# Patient Record
Sex: FEMALE | Race: WHITE | NOT HISPANIC OR LATINO | Employment: OTHER | URBAN - METROPOLITAN AREA
[De-identification: names, ages, dates, MRNs, and addresses within clinical notes are randomized per-mention and may not be internally consistent; named-entity substitution may affect disease eponyms.]

---

## 2017-02-28 ENCOUNTER — TELEPHONE (OUTPATIENT)
Dept: INTERNAL MEDICINE | Facility: CLINIC | Age: 67
End: 2017-02-28

## 2017-02-28 RX ORDER — ROSUVASTATIN CALCIUM 10 MG/1
TABLET, COATED ORAL
Qty: 90 TABLET | Refills: 2 | Status: SHIPPED | OUTPATIENT
Start: 2017-02-28 | End: 2017-03-09 | Stop reason: SDUPTHER

## 2017-02-28 NOTE — TELEPHONE ENCOUNTER
----- Message from Lucie Pacheco sent at 2/28/2017 12:14 PM EST -----  Regarding: INDRA MENDEZ    1st time filled with Dr. Mendez - previously Dr. Gio SANTO 10 MG tablet - TAKE ONE TABLET DAILY   (GENERIC CRESTOR PLEASE)   #90  [facility] [place]  Drug allergies - sulfa    [phone]    Sent to pharmacy

## 2017-03-09 RX ORDER — ROSUVASTATIN CALCIUM 10 MG/1
TABLET, COATED ORAL
Qty: 90 TABLET | Refills: 2 | Status: SHIPPED | OUTPATIENT
Start: 2017-03-09 | End: 2017-09-27

## 2017-03-24 DIAGNOSIS — E03.9 HYPOTHYROIDISM, UNSPECIFIED TYPE: ICD-10-CM

## 2017-03-24 DIAGNOSIS — E78.5 HYPERLIPIDEMIA, UNSPECIFIED HYPERLIPIDEMIA TYPE: ICD-10-CM

## 2017-03-24 DIAGNOSIS — E78.5 HYPERLIPIDEMIA, UNSPECIFIED HYPERLIPIDEMIA TYPE: Primary | ICD-10-CM

## 2017-03-24 LAB
ALBUMIN SERPL-MCNC: 4.4 G/DL (ref 3.5–5.2)
ALBUMIN/GLOB SERPL: 1.5 G/DL
ALP SERPL-CCNC: 65 U/L (ref 40–129)
ALT SERPL-CCNC: 20 U/L (ref 5–33)
AST SERPL-CCNC: 26 U/L (ref 5–32)
BASOPHILS # BLD AUTO: 0.03 10*3/MM3 (ref 0–0.2)
BASOPHILS NFR BLD AUTO: 0.6 % (ref 0–2)
BILIRUB SERPL-MCNC: 0.5 MG/DL (ref 0.2–1.2)
BUN SERPL-MCNC: 11 MG/DL (ref 8–23)
BUN/CREAT SERPL: 16.2 (ref 7–25)
CALCIUM SERPL-MCNC: 9.6 MG/DL (ref 8.8–10.5)
CHLORIDE SERPL-SCNC: 98 MMOL/L (ref 98–107)
CHOLEST SERPL-MCNC: 174 MG/DL (ref 0–200)
CHOLEST/HDLC SERPL: 2.85 {RATIO}
CO2 SERPL-SCNC: 29.1 MMOL/L (ref 22–29)
CREAT SERPL-MCNC: 0.68 MG/DL (ref 0.57–1)
EOSINOPHIL # BLD AUTO: 0.3 10*3/MM3 (ref 0.1–0.3)
EOSINOPHIL NFR BLD AUTO: 6.3 % (ref 0–4)
ERYTHROCYTE [DISTWIDTH] IN BLOOD BY AUTOMATED COUNT: 12.8 % (ref 11.5–14.5)
GLOBULIN SER CALC-MCNC: 3 GM/DL
GLUCOSE SERPL-MCNC: 95 MG/DL (ref 65–99)
HCT VFR BLD AUTO: 39.6 % (ref 37–47)
HDLC SERPL-MCNC: 61 MG/DL (ref 40–60)
HGB BLD-MCNC: 13 G/DL (ref 12–16)
IMM GRANULOCYTES # BLD: 0.01 10*3/MM3 (ref 0–0.03)
IMM GRANULOCYTES NFR BLD: 0.2 % (ref 0–0.5)
LDLC SERPL CALC-MCNC: 74 MG/DL (ref 0–100)
LYMPHOCYTES # BLD AUTO: 1.46 10*3/MM3 (ref 0.6–4.8)
LYMPHOCYTES NFR BLD AUTO: 30.8 % (ref 20–45)
MCH RBC QN AUTO: 33.8 PG (ref 27–31)
MCHC RBC AUTO-ENTMCNC: 32.8 G/DL (ref 31–37)
MCV RBC AUTO: 102.9 FL (ref 81–99)
MONOCYTES # BLD AUTO: 0.51 10*3/MM3 (ref 0–1)
MONOCYTES NFR BLD AUTO: 10.8 % (ref 3–8)
NEUTROPHILS # BLD AUTO: 2.43 10*3/MM3 (ref 1.5–8.3)
NEUTROPHILS NFR BLD AUTO: 51.3 % (ref 45–70)
NRBC BLD AUTO-RTO: 0 /100 WBC (ref 0–0)
PLATELET # BLD AUTO: 285 10*3/MM3 (ref 140–500)
POTASSIUM SERPL-SCNC: 5 MMOL/L (ref 3.5–5.2)
PROT SERPL-MCNC: 7.4 G/DL (ref 6–8.5)
RBC # BLD AUTO: 3.85 10*6/MM3 (ref 4.2–5.4)
SODIUM SERPL-SCNC: 137 MMOL/L (ref 136–145)
T4 FREE SERPL-MCNC: 1.68 NG/DL (ref 0.93–1.7)
TRIGL SERPL-MCNC: 194 MG/DL (ref 0–150)
TSH SERPL DL<=0.005 MIU/L-ACNC: 0.2 MIU/ML (ref 0.27–4.2)
VLDLC SERPL CALC-MCNC: 38.8 MG/DL (ref 7–27)
WBC # BLD AUTO: 4.74 10*3/MM3 (ref 4.8–10.8)

## 2017-03-28 ENCOUNTER — DOCUMENTATION (OUTPATIENT)
Dept: INTERNAL MEDICINE | Facility: CLINIC | Age: 67
End: 2017-03-28

## 2017-03-29 ENCOUNTER — OFFICE VISIT (OUTPATIENT)
Dept: INTERNAL MEDICINE | Facility: CLINIC | Age: 67
End: 2017-03-29

## 2017-03-29 VITALS
HEIGHT: 62 IN | DIASTOLIC BLOOD PRESSURE: 72 MMHG | HEART RATE: 68 BPM | OXYGEN SATURATION: 98 % | BODY MASS INDEX: 29.26 KG/M2 | TEMPERATURE: 98.1 F | WEIGHT: 159 LBS | SYSTOLIC BLOOD PRESSURE: 128 MMHG

## 2017-03-29 DIAGNOSIS — D75.89 MACROCYTOSIS WITHOUT ANEMIA: ICD-10-CM

## 2017-03-29 DIAGNOSIS — E78.5 HYPERLIPIDEMIA, UNSPECIFIED HYPERLIPIDEMIA TYPE: Primary | ICD-10-CM

## 2017-03-29 DIAGNOSIS — F32.A DEPRESSION, UNSPECIFIED DEPRESSION TYPE: ICD-10-CM

## 2017-03-29 DIAGNOSIS — E03.9 HYPOTHYROIDISM, UNSPECIFIED TYPE: ICD-10-CM

## 2017-03-29 DIAGNOSIS — K51.919 ULCERATIVE COLITIS WITH COMPLICATION, UNSPECIFIED LOCATION (HCC): ICD-10-CM

## 2017-03-29 DIAGNOSIS — M85.80 OSTEOPENIA: ICD-10-CM

## 2017-03-29 PROCEDURE — 99214 OFFICE O/P EST MOD 30 MIN: CPT | Performed by: FAMILY MEDICINE

## 2017-03-29 NOTE — PROGRESS NOTES
Subjective     Yelena Rangel is a 66 y.o. female, who presents with a chief complaint of   Chief Complaint   Patient presents with   • Hyperlipidemia   • Hypothyroidism       HPI     1. Hyperlipidemia.  Having muscle pains in arms and legs.    2. Ulcerative colitis.  Pt denies flare.  She sees Dr. Saldaña.    3. Depression. We changed from Pristiq to venlafaxine due to cost.  She reports that she is doing well.    The following portions of the patient's history were reviewed and updated as appropriate: allergies, current medications, past family history, past medical history, past social history, past surgical history and problem list.    Allergies: Sulfa antibiotics    Review of Systems   Constitutional: Negative.    HENT: Negative.    Eyes: Negative.    Respiratory: Negative.    Cardiovascular: Negative.    Gastrointestinal: Negative.    Endocrine: Negative.    Genitourinary: Negative.    Musculoskeletal: Positive for arthralgias.   Skin: Negative.    Allergic/Immunologic: Positive for environmental allergies.   Neurological: Negative.    Hematological: Negative.    Psychiatric/Behavioral: Negative.        Objective     Wt Readings from Last 3 Encounters:   03/29/17 159 lb (72.1 kg)   09/28/16 158 lb (71.7 kg)   09/01/16 160 lb 12.8 oz (72.9 kg)     Temp Readings from Last 3 Encounters:   03/29/17 98.1 °F (36.7 °C)   05/13/16 98.1 °F (36.7 °C) (Oral)   10/26/15 97.4 °F (36.3 °C) (Oral)     BP Readings from Last 3 Encounters:   03/29/17 128/72   09/28/16 130/80   09/01/16 126/86     Pulse Readings from Last 3 Encounters:   03/29/17 68   09/28/16 84   09/01/16 75     Body mass index is 29.08 kg/(m^2).  SpO2 Readings from Last 3 Encounters:   03/29/17 98%   09/28/16 97%   05/13/16 98%       Physical Exam   Constitutional: She is oriented to person, place, and time. She appears well-developed and well-nourished.   HENT:   Head: Normocephalic.   Mouth/Throat: Oropharynx is clear and moist.   Eyes: Conjunctivae and  EOM are normal. Pupils are equal, round, and reactive to light.   Neck: Normal range of motion. Neck supple. No thyromegaly present.   Cardiovascular: Normal rate, regular rhythm and normal heart sounds.    Pulmonary/Chest: Effort normal and breath sounds normal.   Abdominal: Soft. Bowel sounds are normal. There is no hepatosplenomegaly.   Musculoskeletal: Normal range of motion. She exhibits no edema.   Lymphadenopathy:     She has no cervical adenopathy.   Neurological: She is alert and oriented to person, place, and time.   Skin: Skin is warm and dry. No rash noted.   Psychiatric: She has a normal mood and affect. Her behavior is normal.   Vitals reviewed.      Results for orders placed or performed in visit on 03/24/17   Comprehensive Metabolic Panel   Result Value Ref Range    Glucose 95 65 - 99 mg/dL    BUN 11 8 - 23 mg/dL    Creatinine 0.68 0.57 - 1.00 mg/dL    eGFR Non African Am 87 >60 mL/min/1.73    eGFR African Am 105 >60 mL/min/1.73    BUN/Creatinine Ratio 16.2 7.0 - 25.0    Sodium 137 136 - 145 mmol/L    Potassium 5.0 3.5 - 5.2 mmol/L    Chloride 98 98 - 107 mmol/L    Total CO2 29.1 (H) 22.0 - 29.0 mmol/L    Calcium 9.6 8.8 - 10.5 mg/dL    Total Protein 7.4 6.0 - 8.5 g/dL    Albumin 4.40 3.50 - 5.20 g/dL    Globulin 3.0 gm/dL    A/G Ratio 1.5 g/dL    Total Bilirubin 0.5 0.2 - 1.2 mg/dL    Alkaline Phosphatase 65 40 - 129 U/L    AST (SGOT) 26 5 - 32 U/L    ALT (SGPT) 20 5 - 33 U/L   Lipid Panel With / Chol / HDL Ratio   Result Value Ref Range    Total Cholesterol 174 0 - 200 mg/dL    Triglycerides 194 (H) 0 - 150 mg/dL    HDL Cholesterol 61 (H) 40 - 60 mg/dL    VLDL Cholesterol 38.8 (H) 7 - 27 mg/dL    LDL Cholesterol  74 0 - 100 mg/dL    Chol/HDL Ratio 2.85    TSH+Free T4   Result Value Ref Range    TSH 0.203 (L) 0.270 - 4.200 mIU/mL    Free T4 1.68 0.93 - 1.70 ng/dL   CBC w AUTO Differential   Result Value Ref Range    WBC 4.74 (L) 4.80 - 10.80 10*3/mm3    RBC 3.85 (L) 4.20 - 5.40 10*6/mm3     Hemoglobin 13.0 12.0 - 16.0 g/dL    Hematocrit 39.6 37.0 - 47.0 %    .9 (H) 81.0 - 99.0 fL    MCH 33.8 (H) 27.0 - 31.0 pg    MCHC 32.8 31.0 - 37.0 g/dL    RDW 12.8 11.5 - 14.5 %    Platelets 285 140 - 500 10*3/mm3    Neutrophil Rel % 51.3 45.0 - 70.0 %    Lymphocyte Rel % 30.8 20.0 - 45.0 %    Monocyte Rel % 10.8 (H) 3.0 - 8.0 %    Eosinophil Rel % 6.3 (H) 0.0 - 4.0 %    Basophil Rel % 0.6 0.0 - 2.0 %    Neutrophils Absolute 2.43 1.50 - 8.30 10*3/mm3    Lymphocytes Absolute 1.46 0.60 - 4.80 10*3/mm3    Monocytes Absolute 0.51 0.00 - 1.00 10*3/mm3    Eosinophils Absolute 0.30 0.10 - 0.30 10*3/mm3    Basophils Absolute 0.03 0.00 - 0.20 10*3/mm3    Immature Granulocyte Rel % 0.2 0.0 - 0.5 %    Immature Grans Absolute 0.01 0.00 - 0.03 10*3/mm3    nRBC 0.0 0.0 - 0.0 /100 WBC       Assessment/Plan   Yelena was seen today for hyperlipidemia and hypothyroidism.    Diagnoses and all orders for this visit:    Hyperlipidemia, unspecified hyperlipidemia type  -     Comprehensive Metabolic Panel; Future  -     Lipid Panel With / Chol / HDL Ratio; Future    Hypothyroidism, unspecified type  -     CBC & Differential; Future  -     TSH; Future  -     T4, Free; Future    Depression, unspecified depression type    Osteopenia    Macrocytosis without anemia  -     Vitamin B12; Future  -     Folate RBC; Future    Ulcerative colitis with complication, unspecified location    1. Hyperlipidemia.  Not tolerate statin (myalgias).  Hold Crestor X 2 weeks.  If feeling better, stay off the statin until next visit.  Lifestyle measures.    2. Hypothyroidism.  Adequate replacement.    3. Depression.  Controlled.  Continue venlafaxine and lifestyle measures.    4. Osteopenia.  Calcium, vitamin D, and weight bearing exercise.    5. Macrocytosis.  Check b12, folate next time.  Decrease alcohol intake.    6. Ulcerative colitis.  No flare.  Continue same per Dr. Saldaña.      Outpatient Medications Prior to Visit   Medication Sig Dispense  Refill   • AZASAN 75 MG tablet 75 mg daily.     • cetirizine (ZyrTEC) 10 MG tablet Take 10 mg by mouth daily.     • diclofenac (VOLTAREN) 1 % gel gel Apply 4 g topically as needed.     • hydrocortisone (ANUCORT-HC) 25 MG suppository Insert 25 mg into the rectum daily.     • levothyroxine (SYNTHROID, LEVOTHROID) 112 MCG tablet Take 1 tablet by mouth daily. 90 tablet 2   • mesalamine (LIALDA) 1.2 G EC tablet Take  by mouth.     • multivitamin (DAILY RAFFY) tablet tablet Take 1 tablet by mouth daily.     • omeprazole (PriLOSEC) 20 MG capsule TAKE ONE CAPSULE BY MOUTH DAILY 90 capsule 2   • rosuvastatin (CRESTOR) 10 MG tablet Take one po daily 90 tablet 2   • venlafaxine XR (EFFEXOR XR) 75 MG 24 hr capsule Take 1 capsule by mouth Daily. 90 capsule 1     No facility-administered medications prior to visit.      No orders of the defined types were placed in this encounter.    [unfilled]  There are no discontinued medications.      Return in about 6 months (around 9/29/2017).

## 2017-04-03 ENCOUNTER — RESULTS ENCOUNTER (OUTPATIENT)
Dept: INTERNAL MEDICINE | Facility: CLINIC | Age: 67
End: 2017-04-03

## 2017-04-03 DIAGNOSIS — D75.89 MACROCYTOSIS WITHOUT ANEMIA: ICD-10-CM

## 2017-04-03 DIAGNOSIS — E78.5 HYPERLIPIDEMIA, UNSPECIFIED HYPERLIPIDEMIA TYPE: ICD-10-CM

## 2017-04-03 DIAGNOSIS — E03.9 HYPOTHYROIDISM, UNSPECIFIED TYPE: ICD-10-CM

## 2017-04-03 RX ORDER — OMEPRAZOLE 20 MG/1
CAPSULE, DELAYED RELEASE ORAL
Qty: 90 CAPSULE | Refills: 2 | Status: SHIPPED | OUTPATIENT
Start: 2017-04-03 | End: 2017-12-18 | Stop reason: SDUPTHER

## 2017-06-19 RX ORDER — LEVOTHYROXINE SODIUM 112 UG/1
TABLET ORAL
Qty: 90 TABLET | Refills: 1 | Status: SHIPPED | OUTPATIENT
Start: 2017-06-19 | End: 2017-12-07 | Stop reason: SDUPTHER

## 2017-08-01 ENCOUNTER — TRANSCRIBE ORDERS (OUTPATIENT)
Dept: INTERNAL MEDICINE | Facility: CLINIC | Age: 67
End: 2017-08-01

## 2017-08-01 DIAGNOSIS — Z13.9 SCREENING: Primary | ICD-10-CM

## 2017-09-07 ENCOUNTER — HOSPITAL ENCOUNTER (OUTPATIENT)
Dept: MAMMOGRAPHY | Facility: HOSPITAL | Age: 67
Discharge: HOME | End: 2017-09-07
Attending: FAMILY MEDICINE | Admitting: FAMILY MEDICINE

## 2017-09-07 DIAGNOSIS — Z13.9 SCREENING: ICD-10-CM

## 2017-09-07 PROCEDURE — G0202 SCR MAMMO BI INCL CAD: HCPCS

## 2017-09-07 PROCEDURE — 77063 BREAST TOMOSYNTHESIS BI: CPT

## 2017-09-19 ENCOUNTER — OFFICE (OUTPATIENT)
Dept: URBAN - METROPOLITAN AREA OTHER 6 | Facility: OTHER | Age: 67
End: 2017-09-19

## 2017-09-19 VITALS
SYSTOLIC BLOOD PRESSURE: 140 MMHG | HEART RATE: 72 BPM | DIASTOLIC BLOOD PRESSURE: 80 MMHG | WEIGHT: 156 LBS | HEIGHT: 62 IN

## 2017-09-19 DIAGNOSIS — E03.9 HYPOTHYROIDISM, UNSPECIFIED TYPE: ICD-10-CM

## 2017-09-19 DIAGNOSIS — K51.30 ULCERATIVE (CHRONIC) RECTOSIGMOIDITIS WITHOUT COMPLICATIONS: ICD-10-CM

## 2017-09-19 DIAGNOSIS — Z00.00 HEALTHCARE MAINTENANCE: ICD-10-CM

## 2017-09-19 DIAGNOSIS — E78.5 HYPERLIPIDEMIA, UNSPECIFIED HYPERLIPIDEMIA TYPE: Primary | ICD-10-CM

## 2017-09-19 PROCEDURE — 99213 OFFICE O/P EST LOW 20 MIN: CPT

## 2017-09-20 ENCOUNTER — LAB (OUTPATIENT)
Dept: INTERNAL MEDICINE | Facility: CLINIC | Age: 67
End: 2017-09-20

## 2017-09-20 DIAGNOSIS — E78.5 HYPERLIPIDEMIA, UNSPECIFIED HYPERLIPIDEMIA TYPE: ICD-10-CM

## 2017-09-20 DIAGNOSIS — Z00.00 HEALTHCARE MAINTENANCE: ICD-10-CM

## 2017-09-20 DIAGNOSIS — E03.9 HYPOTHYROIDISM, UNSPECIFIED TYPE: ICD-10-CM

## 2017-09-20 LAB
ALBUMIN SERPL-MCNC: 4.3 G/DL (ref 3.5–5.2)
ALBUMIN/GLOB SERPL: 1.4 G/DL
ALP SERPL-CCNC: 60 U/L (ref 40–129)
ALT SERPL-CCNC: 10 U/L (ref 5–33)
AST SERPL-CCNC: 22 U/L (ref 5–32)
BASOPHILS # BLD AUTO: 0.02 10*3/MM3 (ref 0–0.2)
BASOPHILS NFR BLD AUTO: 0.5 % (ref 0–2)
BILIRUB SERPL-MCNC: 0.6 MG/DL (ref 0.2–1.2)
BUN SERPL-MCNC: 11 MG/DL (ref 8–23)
BUN/CREAT SERPL: 18.3 (ref 7–25)
CALCIUM SERPL-MCNC: 9.6 MG/DL (ref 8.8–10.5)
CHLORIDE SERPL-SCNC: 97 MMOL/L (ref 98–107)
CHOLEST SERPL-MCNC: 246 MG/DL (ref 0–200)
CO2 SERPL-SCNC: 25.7 MMOL/L (ref 22–29)
CREAT SERPL-MCNC: 0.6 MG/DL (ref 0.57–1)
CRP SERPL-MCNC: 0.63 MG/DL (ref 0–0.5)
EOSINOPHIL # BLD AUTO: 0.21 10*3/MM3 (ref 0.1–0.3)
EOSINOPHIL NFR BLD AUTO: 5.4 % (ref 0–4)
ERYTHROCYTE [DISTWIDTH] IN BLOOD BY AUTOMATED COUNT: 13.3 % (ref 11.5–14.5)
FOLATE SERPL-MCNC: >20 NG/ML (ref 4.78–24.2)
GLOBULIN SER CALC-MCNC: 3.1 GM/DL
GLUCOSE SERPL-MCNC: 95 MG/DL (ref 65–99)
HCT VFR BLD AUTO: 39.7 % (ref 37–47)
HDLC SERPL-MCNC: 60 MG/DL (ref 40–60)
HGB BLD-MCNC: 13 G/DL (ref 12–16)
IMM GRANULOCYTES # BLD: 0.01 10*3/MM3 (ref 0–0.03)
IMM GRANULOCYTES NFR BLD: 0.3 % (ref 0–0.5)
LDLC SERPL CALC-MCNC: 152 MG/DL (ref 0–100)
LDLC/HDLC SERPL: 2.53 {RATIO}
LYMPHOCYTES # BLD AUTO: 1 10*3/MM3 (ref 0.6–4.8)
LYMPHOCYTES NFR BLD AUTO: 25.5 % (ref 20–45)
MCH RBC QN AUTO: 34 PG (ref 27–31)
MCHC RBC AUTO-ENTMCNC: 32.7 G/DL (ref 31–37)
MCV RBC AUTO: 103.9 FL (ref 81–99)
MONOCYTES # BLD AUTO: 0.4 10*3/MM3 (ref 0–1)
MONOCYTES NFR BLD AUTO: 10.2 % (ref 3–8)
NEUTROPHILS # BLD AUTO: 2.28 10*3/MM3 (ref 1.5–8.3)
NEUTROPHILS NFR BLD AUTO: 58.1 % (ref 45–70)
NRBC BLD AUTO-RTO: 0 /100 WBC (ref 0–0)
PLATELET # BLD AUTO: 306 10*3/MM3 (ref 140–500)
POTASSIUM SERPL-SCNC: 5.1 MMOL/L (ref 3.5–5.2)
PROT SERPL-MCNC: 7.4 G/DL (ref 6–8.5)
RBC # BLD AUTO: 3.82 10*6/MM3 (ref 4.2–5.4)
SODIUM SERPL-SCNC: 137 MMOL/L (ref 136–145)
T4 FREE SERPL-MCNC: 1.53 NG/DL (ref 0.93–1.7)
TRIGL SERPL-MCNC: 170 MG/DL (ref 0–150)
TSH SERPL DL<=0.005 MIU/L-ACNC: 0.18 MIU/ML (ref 0.27–4.2)
VIT B12 SERPL-MCNC: 835 PG/ML (ref 211–946)
VLDLC SERPL CALC-MCNC: 34 MG/DL (ref 7–27)
WBC # BLD AUTO: 3.92 10*3/MM3 (ref 4.8–10.8)

## 2017-09-27 ENCOUNTER — OFFICE VISIT (OUTPATIENT)
Dept: INTERNAL MEDICINE | Facility: CLINIC | Age: 67
End: 2017-09-27

## 2017-09-27 VITALS
OXYGEN SATURATION: 98 % | DIASTOLIC BLOOD PRESSURE: 80 MMHG | HEIGHT: 62 IN | WEIGHT: 155.6 LBS | SYSTOLIC BLOOD PRESSURE: 140 MMHG | BODY MASS INDEX: 28.63 KG/M2 | HEART RATE: 72 BPM | TEMPERATURE: 98.2 F

## 2017-09-27 DIAGNOSIS — E03.9 HYPOTHYROIDISM, UNSPECIFIED TYPE: ICD-10-CM

## 2017-09-27 DIAGNOSIS — E78.5 HYPERLIPIDEMIA, UNSPECIFIED HYPERLIPIDEMIA TYPE: ICD-10-CM

## 2017-09-27 DIAGNOSIS — M85.80 OSTEOPENIA: ICD-10-CM

## 2017-09-27 DIAGNOSIS — K51.919 ULCERATIVE COLITIS WITH COMPLICATION, UNSPECIFIED LOCATION (HCC): ICD-10-CM

## 2017-09-27 DIAGNOSIS — F32.A DEPRESSION, UNSPECIFIED DEPRESSION TYPE: ICD-10-CM

## 2017-09-27 DIAGNOSIS — Z23 NEED FOR INFLUENZA VACCINATION: ICD-10-CM

## 2017-09-27 DIAGNOSIS — D75.89 MACROCYTOSIS WITHOUT ANEMIA: ICD-10-CM

## 2017-09-27 DIAGNOSIS — Z00.00 HEALTHCARE MAINTENANCE: Primary | ICD-10-CM

## 2017-09-27 PROCEDURE — G0008 ADMIN INFLUENZA VIRUS VAC: HCPCS | Performed by: FAMILY MEDICINE

## 2017-09-27 PROCEDURE — 99214 OFFICE O/P EST MOD 30 MIN: CPT | Performed by: FAMILY MEDICINE

## 2017-09-27 NOTE — PROGRESS NOTES
Subjective     Yelena Rangel is a 66 y.o. female, who presents with a chief complaint of   Chief Complaint   Patient presents with   • Hyperlipidemia       Hyperlipidemia     1. Hyperlipidemia.  She stopped the rosuvastatin and her myalgias resolved.    2. Ulcerative colitis.  Pt had a recent mild flare, which she thinks is resolving.  She sees Dr. Saldaña.    3. Depression.  Pt reports she is doing well with venlafaxine.    The following portions of the patient's history were reviewed and updated as appropriate: allergies, current medications, past family history, past medical history, past social history, past surgical history and problem list.    Allergies: Sulfa antibiotics    Review of Systems   Constitutional: Negative.    HENT: Negative.    Eyes: Negative.    Respiratory: Negative.    Cardiovascular: Negative.    Gastrointestinal: Negative.    Endocrine: Negative.    Genitourinary: Negative.    Musculoskeletal: Negative.    Skin: Negative.    Allergic/Immunologic: Positive for environmental allergies.   Neurological: Negative.    Hematological: Negative.    Psychiatric/Behavioral: Negative.        Objective     Wt Readings from Last 3 Encounters:   09/27/17 155 lb 9.6 oz (70.6 kg)   03/29/17 159 lb (72.1 kg)   09/28/16 158 lb (71.7 kg)     Temp Readings from Last 3 Encounters:   09/27/17 98.2 °F (36.8 °C)   03/29/17 98.1 °F (36.7 °C)   05/13/16 98.1 °F (36.7 °C) (Oral)     BP Readings from Last 3 Encounters:   09/27/17 140/80   03/29/17 128/72   09/28/16 130/80     Pulse Readings from Last 3 Encounters:   09/27/17 72   03/29/17 68   09/28/16 84     Body mass index is 28.46 kg/(m^2).  SpO2 Readings from Last 3 Encounters:   09/27/17 98%   03/29/17 98%   09/28/16 97%       Physical Exam   Constitutional: She is oriented to person, place, and time. She appears well-developed and well-nourished.   HENT:   Head: Normocephalic.   Mouth/Throat: Oropharynx is clear and moist.   Eyes: Conjunctivae and EOM are  normal. Pupils are equal, round, and reactive to light.   Neck: Normal range of motion. Neck supple. No thyromegaly present.   Cardiovascular: Normal rate, regular rhythm and normal heart sounds.    Pulmonary/Chest: Effort normal and breath sounds normal.   Abdominal: Soft. Bowel sounds are normal. There is no hepatosplenomegaly.   Musculoskeletal: Normal range of motion. She exhibits no edema.   Lymphadenopathy:     She has no cervical adenopathy.   Neurological: She is alert and oriented to person, place, and time.   Skin: Skin is warm and dry. No rash noted.   Psychiatric: She has a normal mood and affect. Her behavior is normal.   Vitals reviewed.      Results for orders placed or performed in visit on 09/20/17   Comprehensive metabolic panel   Result Value Ref Range    Glucose 95 65 - 99 mg/dL    BUN 11 8 - 23 mg/dL    Creatinine 0.60 0.57 - 1.00 mg/dL    eGFR Non African Am 100 >60 mL/min/1.73    eGFR African Am 122 >60 mL/min/1.73    BUN/Creatinine Ratio 18.3 7.0 - 25.0    Sodium 137 136 - 145 mmol/L    Potassium 5.1 3.5 - 5.2 mmol/L    Chloride 97 (L) 98 - 107 mmol/L    Total CO2 25.7 22.0 - 29.0 mmol/L    Calcium 9.6 8.8 - 10.5 mg/dL    Total Protein 7.4 6.0 - 8.5 g/dL    Albumin 4.30 3.50 - 5.20 g/dL    Globulin 3.1 gm/dL    A/G Ratio 1.4 g/dL    Total Bilirubin 0.6 0.2 - 1.2 mg/dL    Alkaline Phosphatase 60 40 - 129 U/L    AST (SGOT) 22 5 - 32 U/L    ALT (SGPT) 10 5 - 33 U/L   Folate   Result Value Ref Range    Folate >20.00 4.78 - 24.20 ng/mL   Vitamin B12   Result Value Ref Range    Vitamin B-12 835 211 - 946 pg/mL   TSH   Result Value Ref Range    TSH 0.181 (L) 0.270 - 4.200 mIU/mL   T4, free   Result Value Ref Range    Free T4 1.53 0.93 - 1.70 ng/dL   Lipid Panel With LDL / HDL Ratio   Result Value Ref Range    Total Cholesterol 246 (H) 0 - 200 mg/dL    Triglycerides 170 (H) 0 - 150 mg/dL    HDL Cholesterol 60 40 - 60 mg/dL    VLDL Cholesterol 34 (H) 7 - 27 mg/dL    LDL Cholesterol  152 (H) 0 - 100  mg/dL    LDL/HDL Ratio 2.53    C-reactive Protein   Result Value Ref Range    C-Reactive Protein 0.63 (H) 0.00 - 0.50 mg/dL   CBC w AUTO Differential   Result Value Ref Range    WBC 3.92 (L) 4.80 - 10.80 10*3/mm3    RBC 3.82 (L) 4.20 - 5.40 10*6/mm3    Hemoglobin 13.0 12.0 - 16.0 g/dL    Hematocrit 39.7 37.0 - 47.0 %    .9 (H) 81.0 - 99.0 fL    MCH 34.0 (H) 27.0 - 31.0 pg    MCHC 32.7 31.0 - 37.0 g/dL    RDW 13.3 11.5 - 14.5 %    Platelets 306 140 - 500 10*3/mm3    Neutrophil Rel % 58.1 45.0 - 70.0 %    Lymphocyte Rel % 25.5 20.0 - 45.0 %    Monocyte Rel % 10.2 (H) 3.0 - 8.0 %    Eosinophil Rel % 5.4 (H) 0.0 - 4.0 %    Basophil Rel % 0.5 0.0 - 2.0 %    Neutrophils Absolute 2.28 1.50 - 8.30 10*3/mm3    Lymphocytes Absolute 1.00 0.60 - 4.80 10*3/mm3    Monocytes Absolute 0.40 0.00 - 1.00 10*3/mm3    Eosinophils Absolute 0.21 0.10 - 0.30 10*3/mm3    Basophils Absolute 0.02 0.00 - 0.20 10*3/mm3    Immature Granulocyte Rel % 0.3 0.0 - 0.5 %    Immature Grans Absolute 0.01 0.00 - 0.03 10*3/mm3    nRBC 0.0 0.0 - 0.0 /100 WBC       Assessment/Plan   Yelena was seen today for hyperlipidemia.    Diagnoses and all orders for this visit:    Healthcare maintenance  -     pneumococcal conj. 13-valent (PREVNAR-13) vaccine 0.5 mL; Inject 0.5 mL into the shoulder, thigh, or buttocks 1 (One) Time.  -     Flu Vaccine High Dose PF 65YR+    Need for influenza vaccination  -     Flu Vaccine High Dose PF 65YR+    Hyperlipidemia, unspecified hyperlipidemia type  -     Comprehensive Metabolic Panel; Future  -     Lipid Panel With / Chol / HDL Ratio; Future    Hypothyroidism, unspecified type  -     CBC & Differential; Future  -     TSH; Future  -     T4, Free; Future    Depression, unspecified depression type    Osteopenia    Macrocytosis without anemia    Ulcerative colitis with complication, unspecified location    1. Hyperlipidemia.  Off statin.  10 year risk 7%.   Discussed options.  She will treat this with lifestyle measures for  now.    2. Hypothyroidism.  Adequate replacement.    3. Depression.  Controlled.  Continue venlafaxine and lifestyle measures.    4. Osteopenia.  Calcium, vitamin D, and weight bearing exercise.  Next dexa scan 11/2018.    5. Macrocytosis. B12 and folate normal.  She will cut down on alcohol intake and we'll monitor. Consider heme consult.    6. Ulcerative colitis.  Continue per Dr. Saldaña.    7. Routine health maint.  Prevnar and flu vaccine today.  Mammogram UTD.      Outpatient Medications Prior to Visit   Medication Sig Dispense Refill   • AZASAN 75 MG tablet 75 mg daily.     • cetirizine (ZyrTEC) 10 MG tablet Take 10 mg by mouth daily.     • diclofenac (VOLTAREN) 1 % gel gel Apply 4 g topically as needed.     • hydrocortisone (ANUCORT-HC) 25 MG suppository Insert 25 mg into the rectum daily.     • levothyroxine (SYNTHROID, LEVOTHROID) 112 MCG tablet TAKE ONE TABLET BY MOUTH DAILY 90 tablet 1   • mesalamine (LIALDA) 1.2 G EC tablet Take  by mouth.     • multivitamin (DAILY RAFFY) tablet tablet Take 1 tablet by mouth daily.     • omeprazole (priLOSEC) 20 MG capsule TAKE ONE PO DAILY 90 capsule 2   • venlafaxine XR (EFFEXOR XR) 75 MG 24 hr capsule Take 1 capsule by mouth Daily. 90 capsule 1   • rosuvastatin (CRESTOR) 10 MG tablet Take one po daily 90 tablet 2     No facility-administered medications prior to visit.      New Medications Ordered This Visit   Medications   • pneumococcal conj. 13-valent (PREVNAR-13) vaccine 0.5 mL     [unfilled]  Medications Discontinued During This Encounter   Medication Reason   • rosuvastatin (CRESTOR) 10 MG tablet Therapy completed         Return in about 6 months (around 3/27/2018).

## 2017-10-02 ENCOUNTER — RESULTS ENCOUNTER (OUTPATIENT)
Dept: INTERNAL MEDICINE | Facility: CLINIC | Age: 67
End: 2017-10-02

## 2017-10-02 DIAGNOSIS — E03.9 HYPOTHYROIDISM, UNSPECIFIED TYPE: ICD-10-CM

## 2017-10-02 DIAGNOSIS — E78.5 HYPERLIPIDEMIA, UNSPECIFIED HYPERLIPIDEMIA TYPE: ICD-10-CM

## 2017-11-27 ENCOUNTER — OFFICE (OUTPATIENT)
Dept: URBAN - METROPOLITAN AREA CLINIC 71 | Facility: CLINIC | Age: 67
End: 2017-11-27

## 2017-11-27 VITALS
WEIGHT: 160 LBS | HEIGHT: 62 IN | DIASTOLIC BLOOD PRESSURE: 88 MMHG | HEART RATE: 72 BPM | SYSTOLIC BLOOD PRESSURE: 140 MMHG

## 2017-11-27 DIAGNOSIS — K51.30 ULCERATIVE (CHRONIC) RECTOSIGMOIDITIS WITHOUT COMPLICATIONS: ICD-10-CM

## 2017-11-27 PROCEDURE — 99213 OFFICE O/P EST LOW 20 MIN: CPT

## 2017-12-07 RX ORDER — VENLAFAXINE HYDROCHLORIDE 75 MG/1
CAPSULE, EXTENDED RELEASE ORAL
Qty: 90 CAPSULE | Refills: 1 | Status: SHIPPED | OUTPATIENT
Start: 2017-12-07 | End: 2018-05-29 | Stop reason: SDUPTHER

## 2017-12-07 RX ORDER — LEVOTHYROXINE SODIUM 112 UG/1
TABLET ORAL
Qty: 90 TABLET | Refills: 1 | Status: SHIPPED | OUTPATIENT
Start: 2017-12-07 | End: 2018-03-26

## 2017-12-12 ENCOUNTER — ANESTHESIA EVENT (OUTPATIENT)
Dept: PERIOP | Facility: HOSPITAL | Age: 67
End: 2017-12-12

## 2017-12-13 ENCOUNTER — TRANSCRIBE ORDERS (OUTPATIENT)
Dept: ADMINISTRATIVE | Facility: HOSPITAL | Age: 67
End: 2017-12-13

## 2017-12-13 ENCOUNTER — ANESTHESIA (OUTPATIENT)
Dept: PERIOP | Facility: HOSPITAL | Age: 67
End: 2017-12-13

## 2017-12-13 ENCOUNTER — PREP FOR SURGERY (OUTPATIENT)
Dept: OTHER | Facility: HOSPITAL | Age: 67
End: 2017-12-13

## 2017-12-13 ENCOUNTER — ON CAMPUS - OUTPATIENT (OUTPATIENT)
Dept: URBAN - METROPOLITAN AREA HOSPITAL 28 | Facility: HOSPITAL | Age: 67
End: 2017-12-13

## 2017-12-13 ENCOUNTER — LAB (OUTPATIENT)
Dept: LAB | Facility: HOSPITAL | Age: 67
End: 2017-12-13
Attending: INTERNAL MEDICINE

## 2017-12-13 ENCOUNTER — HOSPITAL ENCOUNTER (OUTPATIENT)
Facility: HOSPITAL | Age: 67
Setting detail: HOSPITAL OUTPATIENT SURGERY
Discharge: HOME | End: 2017-12-13
Attending: INTERNAL MEDICINE | Admitting: INTERNAL MEDICINE

## 2017-12-13 VITALS
DIASTOLIC BLOOD PRESSURE: 90 MMHG | HEART RATE: 62 BPM | HEIGHT: 62 IN | WEIGHT: 159 LBS | TEMPERATURE: 98.2 F | OXYGEN SATURATION: 96 % | BODY MASS INDEX: 29.26 KG/M2 | RESPIRATION RATE: 19 BRPM | SYSTOLIC BLOOD PRESSURE: 151 MMHG

## 2017-12-13 DIAGNOSIS — K57.30 DIVERTICULOSIS OF LARGE INTESTINE WITHOUT PERFORATION OR ABS: ICD-10-CM

## 2017-12-13 DIAGNOSIS — K51.20 ULCERATIVE (CHRONIC) PROCTITIS WITHOUT COMPLICATIONS: ICD-10-CM

## 2017-12-13 DIAGNOSIS — R19.7 DIARRHEA, UNSPECIFIED: ICD-10-CM

## 2017-12-13 DIAGNOSIS — K51.30 ULCERATIVE (CHRONIC) RECTOSIGMOIDITIS WITHOUT COMPLICATIONS (HCC): Primary | ICD-10-CM

## 2017-12-13 DIAGNOSIS — K57.30 DIVERTICULAR DISEASE OF COLON: ICD-10-CM

## 2017-12-13 DIAGNOSIS — K51.90 ULCERATIVE COLITIS (HCC): ICD-10-CM

## 2017-12-13 DIAGNOSIS — K51.30 ULCERATIVE (CHRONIC) RECTOSIGMOIDITIS WITHOUT COMPLICATIONS: ICD-10-CM

## 2017-12-13 DIAGNOSIS — K51.30 ULCERATIVE (CHRONIC) RECTOSIGMOIDITIS WITHOUT COMPLICATIONS (HCC): ICD-10-CM

## 2017-12-13 LAB — HBV SURFACE AG SERPL QL IA: NORMAL

## 2017-12-13 PROCEDURE — 86704 HEP B CORE ANTIBODY TOTAL: CPT

## 2017-12-13 PROCEDURE — 25010000002 PROPOFOL 10 MG/ML EMULSION: Performed by: NURSE ANESTHETIST, CERTIFIED REGISTERED

## 2017-12-13 PROCEDURE — 86481 TB AG RESPONSE T-CELL SUSP: CPT

## 2017-12-13 PROCEDURE — 45331 SIGMOIDOSCOPY AND BIOPSY: CPT

## 2017-12-13 PROCEDURE — 87340 HEPATITIS B SURFACE AG IA: CPT

## 2017-12-13 PROCEDURE — 36415 COLL VENOUS BLD VENIPUNCTURE: CPT

## 2017-12-13 RX ORDER — PROPOFOL 10 MG/ML
VIAL (ML) INTRAVENOUS AS NEEDED
Status: DISCONTINUED | OUTPATIENT
Start: 2017-12-13 | End: 2017-12-13 | Stop reason: SURG

## 2017-12-13 RX ORDER — SODIUM CHLORIDE 9 MG/ML
40 INJECTION, SOLUTION INTRAVENOUS AS NEEDED
Status: DISCONTINUED | OUTPATIENT
Start: 2017-12-13 | End: 2017-12-13 | Stop reason: HOSPADM

## 2017-12-13 RX ORDER — LIDOCAINE HYDROCHLORIDE 20 MG/ML
INJECTION, SOLUTION INFILTRATION; PERINEURAL AS NEEDED
Status: DISCONTINUED | OUTPATIENT
Start: 2017-12-13 | End: 2017-12-13 | Stop reason: SURG

## 2017-12-13 RX ORDER — MAGNESIUM HYDROXIDE 1200 MG/15ML
LIQUID ORAL AS NEEDED
Status: DISCONTINUED | OUTPATIENT
Start: 2017-12-13 | End: 2017-12-13 | Stop reason: HOSPADM

## 2017-12-13 RX ORDER — SODIUM CHLORIDE 0.9 % (FLUSH) 0.9 %
1-10 SYRINGE (ML) INJECTION AS NEEDED
Status: DISCONTINUED | OUTPATIENT
Start: 2017-12-13 | End: 2017-12-13 | Stop reason: HOSPADM

## 2017-12-13 RX ORDER — LIDOCAINE HYDROCHLORIDE 10 MG/ML
0.5 INJECTION, SOLUTION EPIDURAL; INFILTRATION; INTRACAUDAL; PERINEURAL ONCE AS NEEDED
Status: COMPLETED | OUTPATIENT
Start: 2017-12-13 | End: 2017-12-13

## 2017-12-13 RX ORDER — SODIUM CHLORIDE, SODIUM LACTATE, POTASSIUM CHLORIDE, CALCIUM CHLORIDE 600; 310; 30; 20 MG/100ML; MG/100ML; MG/100ML; MG/100ML
9 INJECTION, SOLUTION INTRAVENOUS CONTINUOUS
Status: DISCONTINUED | OUTPATIENT
Start: 2017-12-13 | End: 2017-12-13 | Stop reason: HOSPADM

## 2017-12-13 RX ADMIN — PROPOFOL 30 MG: 10 INJECTION, EMULSION INTRAVENOUS at 13:30

## 2017-12-13 RX ADMIN — SODIUM CHLORIDE, POTASSIUM CHLORIDE, SODIUM LACTATE AND CALCIUM CHLORIDE: 600; 310; 30; 20 INJECTION, SOLUTION INTRAVENOUS at 13:20

## 2017-12-13 RX ADMIN — PROPOFOL 20 MG: 10 INJECTION, EMULSION INTRAVENOUS at 13:33

## 2017-12-13 RX ADMIN — LIDOCAINE HYDROCHLORIDE 0.5 ML: 10 INJECTION, SOLUTION EPIDURAL; INFILTRATION; INTRACAUDAL; PERINEURAL at 12:05

## 2017-12-13 RX ADMIN — SODIUM CHLORIDE, POTASSIUM CHLORIDE, SODIUM LACTATE AND CALCIUM CHLORIDE 9 ML/HR: 600; 310; 30; 20 INJECTION, SOLUTION INTRAVENOUS at 12:06

## 2017-12-13 RX ADMIN — LIDOCAINE HYDROCHLORIDE 100 MG: 20 INJECTION, SOLUTION INFILTRATION; PERINEURAL at 13:27

## 2017-12-13 RX ADMIN — PROPOFOL 100 MG: 10 INJECTION, EMULSION INTRAVENOUS at 13:28

## 2017-12-13 RX ADMIN — PROPOFOL 50 MG: 10 INJECTION, EMULSION INTRAVENOUS at 13:29

## 2017-12-13 NOTE — BRIEF OP NOTE
SIGMOIDOSCOPY FLEXIBLE  Progress Note    Yelena Rangel  12/13/2017    Pre-op Diagnosis:   K51.30       Post-Op Diagnosis Codes:     * Ulcerative colitis confined to rectum [K51.20]     * Diverticulosis [K57.90]    Procedure/CPT® Codes:      Procedure(s):  SIGMOIDOSCOPY FLEXIBLE with biopsy    Surgeon(s):  Dl Saldaña MD    Anesthesia: Monitor Anesthesia Care    Staff:   Circulator: Cary Ramos RN  Scrub Person: MICKY URIARTE    Estimated Blood Loss: none    Urine Voided: * No values recorded between 12/13/2017  1:22 PM and 12/13/2017  1:37 PM *    Specimens:                  ID Type Source Tests Collected by Time Destination   A :  Tissue Large Intestine, Rectum TISSUE EXAM Dl Saldaña MD 12/13/2017 1332          Drains:           Findings: Flex Sig to 60cm  Sigmoid Diverticulosis  Rectal Ulcerative Colitis Mild/Mod- Biopsy    Complications: none      Dl Saldaña MD     Date: 12/13/2017  Time: 1:37 PM

## 2017-12-13 NOTE — H&P
Patient Care Team:  Juliano Mendez MD as PCP - General (Family Medicine)  Dl Saldaña MD as Consulting Physician (Gastroenterology)    CHIEF COMPLAINT: Diarrhea, UC    HISTORY OF PRESENT ILLNESS:    3-6 BMs a day no blood or mucous nor sig cramping      Past Medical History:   Diagnosis Date   • Arthritis    • Disease of thyroid gland    • Hyperlipidemia    • Hypothyroidism    • Ulcerative colitis    • WPW (Liseth-Parkinson-White syndrome)      Past Surgical History:   Procedure Laterality Date   • COLONOSCOPY N/A 5/13/2016    Procedure: COLONOSCOPY with biopsies;  Surgeon: Dl Saldaña MD;  Location: [facility];  Service:    • COLONOSCOPY W/ POLYPECTOMY     • HYSTERECTOMY     • THYROID LOBECTOMY       Family History   Problem Relation Age of Onset   • Breast cancer Mother    • Breast cancer Sister      Social History   Substance Use Topics   • Smoking status: Never Smoker   • Smokeless tobacco: Never Used   • Alcohol use Yes      Comment: twice week     Prescriptions Prior to Admission   Medication Sig Dispense Refill Last Dose   • AZASAN 75 MG tablet 75 mg daily.   12/12/2017 at 800   • cetirizine (ZyrTEC) 10 MG tablet Take 10 mg by mouth daily.   12/12/2017 at 800   • diclofenac (VOLTAREN) 1 % gel gel Apply 4 g topically as needed.   Past Week at Unknown time   • levothyroxine (SYNTHROID, LEVOTHROID) 112 MCG tablet TAKE ONE TABLET BY MOUTH DAILY 90 tablet 1 12/12/2017 at 800   • mesalamine (LIALDA) 1.2 G EC tablet Take  by mouth.   12/12/2017 at 800   • multivitamin (DAILY RAFFY) tablet tablet Take 1 tablet by mouth daily.   12/6/2017 at Unknown time   • omeprazole (priLOSEC) 20 MG capsule TAKE ONE PO DAILY 90 capsule 2 12/12/2017 at 800   • venlafaxine XR (EFFEXOR-XR) 75 MG 24 hr capsule TAKE ONE CAPSULE BY MOUTH DAILY 90 capsule 1 12/12/2017 at 800   • hydrocortisone (ANUCORT-HC) 25 MG suppository Insert 25 mg into the rectum daily.   More than a month at Unknown time  "    Allergies:  Sulfa antibiotics    REVIEW OF SYSTEMS:  Please see the above history of present illness for pertinent positives and negatives.  The remainder of the patient's systems have been reviewed and are negative.     Vital Signs  Temp:  [98.2 °F (36.8 °C)] 98.2 °F (36.8 °C)  Heart Rate:  [66] 66  Resp:  [16] 16  BP: (146)/(86) 146/86    Flowsheet Rows         First Filed Value    Admission Height  157.5 cm (62\") Documented at 12/12/2017 1454    Admission Weight  70.3 kg (155 lb) Documented at 12/12/2017 1454           Physical Exam:  Physical Exam   Constitutional: Patient appears well-developed and well-nourished and in no acute distress   HEENT:   Head: Normocephalic and atraumatic.   Eyes:  Pupils are equal, round, and reactive to light. EOM are intact. Sclera are anicteric and non-injected.  Mouth and Throat: Patient has moist mucous membranes. Oropharynx is clear of any erythema or exudate.     Neck: Neck supple. No JVD present. No thyromegaly present. No lymphadenopathy present.  Cardiovascular: Regular rate, regular rhythm, S1 normal and S2 normal.  Exam reveals no gallop and no friction rub.  No murmur heard.  Pulmonary/Chest: Lungs are clear to auscultation bilaterally. No respiratory distress. No wheezes. No rhonchi. No rales.   Abdominal: Soft. Bowel sounds are normal. No distension and no mass. There is no hepatosplenomegaly. There is no tenderness.   Musculoskeletal: Normal Muscle tone  Extremities: No edema. Pulses are palpable in all 4 extremities.  Neurological: Patient is alert and oriented to person, place, and time. Cranial nerves II-XII are grossly intact with no focal deficits.  Skin: Skin is warm. No rash noted. Nails show no clubbing.  No cyanosis or erythema.     Results Review:    I reviewed the patient's new clinical results.  Lab Results (most recent)     None          Imaging Results (most recent)     None        reviewed    ECG/EMG Results (most recent)     None    "     reviewed    Assessment/Plan     Diarrhea  UC    Flex Sig w Biopsy    I discussed the patients findings and my recommendations with patient.     Dl Saldaña MD  12/13/17  1:16 PM    Time: 10 min prior to procedure.

## 2017-12-13 NOTE — PLAN OF CARE
Problem: GI Endoscopy (Adult)  Goal: Signs and Symptoms of Listed Potential Problems Will be Absent or Manageable (GI Endoscopy)  Outcome: Ongoing (interventions implemented as appropriate)    12/13/17 1341   GI Endoscopy   Problems Assessed (GI Endoscopy) all   Problems Present (GI Endoscopy) none

## 2017-12-13 NOTE — ANESTHESIA POSTPROCEDURE EVALUATION
Patient: Yelena Rangel    Procedure Summary     Date Anesthesia Start Anesthesia Stop Room / Location    12/13/17 1325 [id number] [facility] / [facility]       Procedure Diagnosis Surgeon Provider    SIGMOIDOSCOPY FLEXIBLE with biopsy (N/A ) Ulcerative colitis confined to rectum; Diverticulosis  (K51.30) MD Tom Castaneda CRNA          Anesthesia Type: MAC  Last vitals  BP   101/81 (12/13/17 1345)   Temp   98.2 °F (36.8 °C) (12/13/17 1140)   Pulse   67 (12/13/17 1345)   Resp   14 (12/13/17 1345)     SpO2   94 % (12/13/17 1345)     Post Anesthesia Care and Evaluation    Patient location during evaluation: PHASE II  Patient participation: complete - patient participated  Level of consciousness: awake and alert  Pain score: 2  Pain management: adequate  Airway patency: patent  Anesthetic complications: No anesthetic complications  PONV Status: none  Cardiovascular status: acceptable  Respiratory status: acceptable  Hydration status: acceptable

## 2017-12-13 NOTE — ANESTHESIA PREPROCEDURE EVALUATION
Anesthesia Evaluation     no history of anesthetic complications:  NPO Solid Status: > 8 hours  NPO Liquid Status: > 8 hours     Airway   Mallampati: III  TM distance: >3 FB  Neck ROM: full  possible difficult intubation  Dental    (+) upper dentures    Comment: tight    Pulmonary - normal exam    breath sounds clear to auscultation  (+) a smoker (none for 35 years) Former,   Cardiovascular - normal exam    Rhythm: regular  Rate: normal    (+) dysrhythmias (history of WPW-told benign and nothing to worry about. No longer sees cardiologist.), hyperlipidemia (has pain from statins)    ROS comment: Follows with Dr Cool    Neuro/Psych  (+) psychiatric history Depression,    GI/Hepatic/Renal/Endo    (+)  GERD well controlled, hypothyroidism,     Musculoskeletal     Abdominal  - normal exam   Substance History   Alcohol use: beer occ.     OB/GYN negative ob/gyn ROS         Other   (+) arthritis (knee pain)                                     Anesthesia Plan    ASA 2     MAC     intravenous induction   Anesthetic plan and risks discussed with patient and spouse/significant other.

## 2017-12-13 NOTE — PLAN OF CARE
Problem: Patient Care Overview (Adult)  Goal: Plan of Care Review  Outcome: Ongoing (interventions implemented as appropriate)    12/13/17 1214   Coping/Psychosocial Response Interventions   Plan Of Care Reviewed With patient;spouse   Outcome Evaluation   Outcome Summary/Follow up Plan vss, awaiting procedure       Goal: Adult Individualization and Mutuality  Outcome: Ongoing (interventions implemented as appropriate)    Problem: GI Endoscopy (Adult)  Goal: Signs and Symptoms of Listed Potential Problems Will be Absent or Manageable (GI Endoscopy)  Outcome: Ongoing (interventions implemented as appropriate)

## 2017-12-13 NOTE — PLAN OF CARE
Problem: Patient Care Overview (Adult)  Goal: Plan of Care Review  Outcome: Outcome(s) achieved Date Met:  12/13/17 12/13/17 6330   Coping/Psychosocial Response Interventions   Plan Of Care Reviewed With patient;spouse   Outcome Evaluation   Outcome Summary/Follow up Plan vss, awaiting discharge tolerating PO       Goal: Adult Individualization and Mutuality  Outcome: Outcome(s) achieved Date Met:  12/13/17    Problem: GI Endoscopy (Adult)  Goal: Signs and Symptoms of Listed Potential Problems Will be Absent or Manageable (GI Endoscopy)  Outcome: Outcome(s) achieved Date Met:  12/13/17

## 2017-12-13 NOTE — OP NOTE
SIGMOIDOSCOPY FLEXIBLE  Procedure Report    Patient Name:  Yelena Rangel  YOB: 1950    Date of Surgery:  12/13/2017     Indications:  Diarrhea and history of UC    Pre-op Diagnosis:   K51.30    Post-Op Diagnosis Codes:     * Ulcerative colitis confined to rectum [K51.20]     * Diverticulosis [K57.90]         Procedure/CPT® Codes:      Procedure(s):  SIGMOIDOSCOPY FLEXIBLE with biopsy    Staff:  Surgeon(s):  Dl Saldaña MD         Anesthesia: Monitor Anesthesia Care    Estimated Blood Loss: none    Specimens:   ID Type Source Tests Collected by Time Destination   A :  Tissue Large Intestine, Rectum TISSUE EXAM Dl Saldaña MD 12/13/2017 1332        Implants:    Nothing was implanted during the procedure      Description of Procedure: After having signed informed consent, she was brought to the endoscopy suite and placed in left lateral decubitus position, given her IV sedation. Rectal exam revealed a single external skin tag but normal anal tone, no rectal mass. Scope was introduced into rectum and advanced under direct visualization. From the rectum, had mild-to-moderate UC present with erosions, erythema, loss of vascular pattern. The scope was advanced into the sigmoid colon, past scattered diverticular openings through a fairly well prepped sigmoid colon. However, not all the bowel contents could be aspirated. Scope was able to be advanced to 60 cm. The splenic flexure was normal appearing as was the descending and sigmoid colon. There was a line of demarcation of her ulcerative colitis at approximately 15 cm. Biopsies were taken from 15 cm to the anal verge and labeled rectal biopsies. Scope was not retroflexed. The scope was taken from the patient. She tolerated procedure very well.          Findings: Flex Sig to 60cm  Sigmoid Diverticulosis  Rectal Ulcerative Colitis Mild/Mod- Biopsy    Complications: none    Recommendations: Will Rx HC Suppositories and proceed to  preparing Pt for Remicade      Dl Saldaña MD     Date: 12/13/2017  Time: 1:38 PM

## 2017-12-14 LAB — HBV CORE AB SER DONR QL IA: NEGATIVE

## 2017-12-15 LAB
TSPOT INTERPRETATION: NEGATIVE
TSPOT NIL CONTROL: 0
TSPOT PANEL A: 1
TSPOT PANEL B: 3
TSPOT POS CONTROL: 361

## 2017-12-18 RX ORDER — OMEPRAZOLE 20 MG/1
CAPSULE, DELAYED RELEASE ORAL
Qty: 90 CAPSULE | Refills: 1 | Status: SHIPPED | OUTPATIENT
Start: 2017-12-18 | End: 2018-07-08 | Stop reason: SDUPTHER

## 2017-12-20 LAB
LAB AP CASE REPORT: NORMAL
LAB AP CLINICAL INFORMATION: NORMAL
Lab: NORMAL
PATH REPORT.FINAL DX SPEC: NORMAL

## 2018-02-07 ENCOUNTER — OFFICE (OUTPATIENT)
Dept: URBAN - METROPOLITAN AREA INFUSION 3 | Facility: INFUSION | Age: 68
End: 2018-02-07

## 2018-02-07 VITALS
HEART RATE: 77 BPM | DIASTOLIC BLOOD PRESSURE: 75 MMHG | HEIGHT: 62 IN | SYSTOLIC BLOOD PRESSURE: 133 MMHG | RESPIRATION RATE: 18 BRPM | HEART RATE: 73 BPM | HEART RATE: 70 BPM | HEART RATE: 72 BPM | HEART RATE: 78 BPM | SYSTOLIC BLOOD PRESSURE: 127 MMHG | TEMPERATURE: 97 F | DIASTOLIC BLOOD PRESSURE: 83 MMHG | SYSTOLIC BLOOD PRESSURE: 117 MMHG | RESPIRATION RATE: 16 BRPM | WEIGHT: 150 LBS | DIASTOLIC BLOOD PRESSURE: 81 MMHG | DIASTOLIC BLOOD PRESSURE: 80 MMHG | SYSTOLIC BLOOD PRESSURE: 119 MMHG | DIASTOLIC BLOOD PRESSURE: 77 MMHG | TEMPERATURE: 97.7 F | SYSTOLIC BLOOD PRESSURE: 118 MMHG | DIASTOLIC BLOOD PRESSURE: 74 MMHG | SYSTOLIC BLOOD PRESSURE: 115 MMHG | HEART RATE: 75 BPM

## 2018-02-07 DIAGNOSIS — K51.30 ULCERATIVE (CHRONIC) RECTOSIGMOIDITIS WITHOUT COMPLICATIONS: ICD-10-CM

## 2018-02-07 PROCEDURE — 96415 CHEMO IV INFUSION ADDL HR: CPT

## 2018-02-07 PROCEDURE — 96413 CHEMO IV INFUSION 1 HR: CPT

## 2018-02-07 NOTE — SERVICENOTES
Pt took 2 ES Tylenol and Zyrtec 10mg this am, no meds given to pt here
MEDS PROVIDED BY P
NEGATIVE PPD or Quantiferon Gold: Annual 12/17

## 2018-02-21 ENCOUNTER — OFFICE (OUTPATIENT)
Dept: URBAN - METROPOLITAN AREA INFUSION 3 | Facility: INFUSION | Age: 68
End: 2018-02-21

## 2018-02-21 VITALS
SYSTOLIC BLOOD PRESSURE: 115 MMHG | SYSTOLIC BLOOD PRESSURE: 102 MMHG | WEIGHT: 150 LBS | DIASTOLIC BLOOD PRESSURE: 75 MMHG | HEART RATE: 74 BPM | HEIGHT: 62 IN | HEART RATE: 71 BPM | HEART RATE: 85 BPM | HEART RATE: 68 BPM | TEMPERATURE: 97.3 F | TEMPERATURE: 97.2 F | DIASTOLIC BLOOD PRESSURE: 78 MMHG | DIASTOLIC BLOOD PRESSURE: 64 MMHG | SYSTOLIC BLOOD PRESSURE: 103 MMHG | RESPIRATION RATE: 16 BRPM | HEART RATE: 77 BPM | SYSTOLIC BLOOD PRESSURE: 121 MMHG | DIASTOLIC BLOOD PRESSURE: 68 MMHG | SYSTOLIC BLOOD PRESSURE: 105 MMHG | HEART RATE: 78 BPM | DIASTOLIC BLOOD PRESSURE: 71 MMHG | DIASTOLIC BLOOD PRESSURE: 80 MMHG | SYSTOLIC BLOOD PRESSURE: 118 MMHG | HEART RATE: 75 BPM | DIASTOLIC BLOOD PRESSURE: 66 MMHG | SYSTOLIC BLOOD PRESSURE: 106 MMHG | DIASTOLIC BLOOD PRESSURE: 79 MMHG

## 2018-02-21 DIAGNOSIS — K51.30 ULCERATIVE (CHRONIC) RECTOSIGMOIDITIS WITHOUT COMPLICATIONS: ICD-10-CM

## 2018-02-21 PROCEDURE — 96415 CHEMO IV INFUSION ADDL HR: CPT

## 2018-02-21 PROCEDURE — 96413 CHEMO IV INFUSION 1 HR: CPT

## 2018-03-13 DIAGNOSIS — K51.919 ULCERATIVE COLITIS WITH COMPLICATION, UNSPECIFIED LOCATION (HCC): ICD-10-CM

## 2018-03-13 DIAGNOSIS — D75.89 MACROCYTOSIS WITHOUT ANEMIA: ICD-10-CM

## 2018-03-13 DIAGNOSIS — E03.9 HYPOTHYROIDISM, UNSPECIFIED TYPE: ICD-10-CM

## 2018-03-13 DIAGNOSIS — E78.5 HYPERLIPIDEMIA, UNSPECIFIED HYPERLIPIDEMIA TYPE: ICD-10-CM

## 2018-03-14 ENCOUNTER — LAB (OUTPATIENT)
Dept: INTERNAL MEDICINE | Facility: CLINIC | Age: 68
End: 2018-03-14

## 2018-03-14 DIAGNOSIS — D75.89 MACROCYTOSIS WITHOUT ANEMIA: ICD-10-CM

## 2018-03-14 DIAGNOSIS — K51.919 ULCERATIVE COLITIS WITH COMPLICATION, UNSPECIFIED LOCATION (HCC): ICD-10-CM

## 2018-03-14 DIAGNOSIS — E03.9 HYPOTHYROIDISM, UNSPECIFIED TYPE: ICD-10-CM

## 2018-03-14 DIAGNOSIS — E78.5 HYPERLIPIDEMIA, UNSPECIFIED HYPERLIPIDEMIA TYPE: ICD-10-CM

## 2018-03-14 LAB
ALBUMIN SERPL-MCNC: 4.3 G/DL (ref 3.5–5.2)
ALBUMIN/GLOB SERPL: 1.4 G/DL
ALP SERPL-CCNC: 57 U/L (ref 40–129)
ALT SERPL-CCNC: 10 U/L (ref 5–33)
AST SERPL-CCNC: 18 U/L (ref 5–32)
BASOPHILS # BLD AUTO: 0.03 10*3/MM3 (ref 0–0.2)
BASOPHILS NFR BLD AUTO: 0.8 % (ref 0–2)
BILIRUB SERPL-MCNC: 0.5 MG/DL (ref 0.2–1.2)
BUN SERPL-MCNC: 8 MG/DL (ref 8–23)
BUN/CREAT SERPL: 16.3 (ref 7–25)
CALCIUM SERPL-MCNC: 9.5 MG/DL (ref 8.8–10.5)
CHLORIDE SERPL-SCNC: 102 MMOL/L (ref 98–107)
CHOLEST SERPL-MCNC: 252 MG/DL (ref 0–200)
CO2 SERPL-SCNC: 23.4 MMOL/L (ref 22–29)
CREAT SERPL-MCNC: 0.49 MG/DL (ref 0.57–1)
EOSINOPHIL # BLD AUTO: 0.21 10*3/MM3 (ref 0.1–0.3)
EOSINOPHIL NFR BLD AUTO: 5.5 % (ref 0–4)
ERYTHROCYTE [DISTWIDTH] IN BLOOD BY AUTOMATED COUNT: 12.7 % (ref 11.5–14.5)
GFR SERPLBLD CREATININE-BSD FMLA CKD-EPI: 126 ML/MIN/1.73
GFR SERPLBLD CREATININE-BSD FMLA CKD-EPI: >150 ML/MIN/1.73
GLOBULIN SER CALC-MCNC: 3 GM/DL
GLUCOSE SERPL-MCNC: 97 MG/DL (ref 65–99)
HCT VFR BLD AUTO: 38.4 % (ref 37–47)
HDLC SERPL-MCNC: 58 MG/DL (ref 40–60)
HGB BLD-MCNC: 13.1 G/DL (ref 12–16)
IMM GRANULOCYTES # BLD: 0.01 10*3/MM3 (ref 0–0.03)
IMM GRANULOCYTES NFR BLD: 0.3 % (ref 0–0.5)
LDLC SERPL CALC-MCNC: 147 MG/DL (ref 0–100)
LDLC/HDLC SERPL: 2.53 {RATIO}
LYMPHOCYTES # BLD AUTO: 1.15 10*3/MM3 (ref 0.6–4.8)
LYMPHOCYTES NFR BLD AUTO: 30 % (ref 20–45)
MCH RBC QN AUTO: 35.3 PG (ref 27–31)
MCHC RBC AUTO-ENTMCNC: 34.1 G/DL (ref 31–37)
MCV RBC AUTO: 103.5 FL (ref 81–99)
MONOCYTES # BLD AUTO: 0.5 10*3/MM3 (ref 0–1)
MONOCYTES NFR BLD AUTO: 13.1 % (ref 3–8)
NEUTROPHILS # BLD AUTO: 1.93 10*3/MM3 (ref 1.5–8.3)
NEUTROPHILS NFR BLD AUTO: 50.3 % (ref 45–70)
NRBC BLD AUTO-RTO: 0 /100 WBC (ref 0–0)
PLATELET # BLD AUTO: 294 10*3/MM3 (ref 140–500)
POTASSIUM SERPL-SCNC: 4.4 MMOL/L (ref 3.5–5.2)
PROT SERPL-MCNC: 7.3 G/DL (ref 6–8.5)
RBC # BLD AUTO: 3.71 10*6/MM3 (ref 4.2–5.4)
SODIUM SERPL-SCNC: 138 MMOL/L (ref 136–145)
T4 FREE SERPL-MCNC: 1.5 NG/DL (ref 0.93–1.7)
TRIGL SERPL-MCNC: 235 MG/DL (ref 0–150)
TSH SERPL DL<=0.005 MIU/L-ACNC: 0.12 MIU/ML (ref 0.27–4.2)
VIT B12 SERPL-MCNC: 829 PG/ML (ref 232–1245)
VLDLC SERPL CALC-MCNC: 47 MG/DL (ref 7–27)
WBC # BLD AUTO: 3.83 10*3/MM3 (ref 4.8–10.8)

## 2018-03-21 ENCOUNTER — OFFICE (OUTPATIENT)
Dept: URBAN - METROPOLITAN AREA INFUSION 3 | Facility: INFUSION | Age: 68
End: 2018-03-21

## 2018-03-21 VITALS
TEMPERATURE: 97.7 F | DIASTOLIC BLOOD PRESSURE: 76 MMHG | DIASTOLIC BLOOD PRESSURE: 77 MMHG | RESPIRATION RATE: 16 BRPM | HEART RATE: 66 BPM | SYSTOLIC BLOOD PRESSURE: 118 MMHG | HEIGHT: 62 IN | SYSTOLIC BLOOD PRESSURE: 122 MMHG | SYSTOLIC BLOOD PRESSURE: 133 MMHG | HEART RATE: 70 BPM | DIASTOLIC BLOOD PRESSURE: 75 MMHG | HEART RATE: 67 BPM | DIASTOLIC BLOOD PRESSURE: 80 MMHG | TEMPERATURE: 97.1 F | DIASTOLIC BLOOD PRESSURE: 79 MMHG | HEART RATE: 65 BPM | SYSTOLIC BLOOD PRESSURE: 126 MMHG | SYSTOLIC BLOOD PRESSURE: 121 MMHG | SYSTOLIC BLOOD PRESSURE: 119 MMHG | WEIGHT: 150 LBS | SYSTOLIC BLOOD PRESSURE: 125 MMHG | SYSTOLIC BLOOD PRESSURE: 123 MMHG | DIASTOLIC BLOOD PRESSURE: 73 MMHG

## 2018-03-21 DIAGNOSIS — K51.30 ULCERATIVE (CHRONIC) RECTOSIGMOIDITIS WITHOUT COMPLICATIONS: ICD-10-CM

## 2018-03-21 PROCEDURE — 96413 CHEMO IV INFUSION 1 HR: CPT

## 2018-03-21 PROCEDURE — 96415 CHEMO IV INFUSION ADDL HR: CPT

## 2018-03-21 NOTE — SERVICENOTES
pt took 2 ES Tylenol and 10mg Zyrtec this am, no PO meds given here
MEDS PROVIDED BY [facility]
NEGATIVE PPD or Quantiferon Gold: Annual

## 2018-03-26 ENCOUNTER — OFFICE VISIT (OUTPATIENT)
Dept: INTERNAL MEDICINE | Facility: CLINIC | Age: 68
End: 2018-03-26

## 2018-03-26 VITALS
DIASTOLIC BLOOD PRESSURE: 80 MMHG | RESPIRATION RATE: 16 BRPM | BODY MASS INDEX: 30 KG/M2 | HEIGHT: 62 IN | SYSTOLIC BLOOD PRESSURE: 140 MMHG | TEMPERATURE: 98.2 F | WEIGHT: 163 LBS | HEART RATE: 73 BPM | OXYGEN SATURATION: 98 %

## 2018-03-26 DIAGNOSIS — K51.919 ULCERATIVE COLITIS WITH COMPLICATION, UNSPECIFIED LOCATION (HCC): ICD-10-CM

## 2018-03-26 DIAGNOSIS — Z00.00 ROUTINE HEALTH MAINTENANCE: ICD-10-CM

## 2018-03-26 DIAGNOSIS — D75.89 MACROCYTOSIS WITHOUT ANEMIA: ICD-10-CM

## 2018-03-26 DIAGNOSIS — M85.88 OSTEOPENIA OF SPINE: ICD-10-CM

## 2018-03-26 DIAGNOSIS — E03.9 HYPOTHYROIDISM, UNSPECIFIED TYPE: ICD-10-CM

## 2018-03-26 DIAGNOSIS — F32.A DEPRESSION, UNSPECIFIED DEPRESSION TYPE: ICD-10-CM

## 2018-03-26 DIAGNOSIS — E78.5 HYPERLIPIDEMIA, UNSPECIFIED HYPERLIPIDEMIA TYPE: Primary | ICD-10-CM

## 2018-03-26 PROCEDURE — 99214 OFFICE O/P EST MOD 30 MIN: CPT | Performed by: FAMILY MEDICINE

## 2018-03-26 RX ORDER — LEVOTHYROXINE SODIUM 0.1 MG/1
100 TABLET ORAL DAILY
Qty: 90 TABLET | Refills: 1 | Status: SHIPPED | OUTPATIENT
Start: 2018-03-26 | End: 2018-09-16 | Stop reason: SDUPTHER

## 2018-03-26 NOTE — PROGRESS NOTES
Subjective     Yelena Rangel is a 67 y.o. female, who presents with a chief complaint of   Chief Complaint   Patient presents with   • Hyperlipidemia       Hyperlipidemia     1. Hyperlipidemia.  Wasn't able to tolerate rosuvastatin d/t myalgias.  Has been off of it for one year.  She is now exercising daily with exercise bike or rowing machine.    2. Ulcerative colitis.  Dr. Saldaña started her on Remicade last month.  She will have infusions every 60 days.  She thinks it is starting to help control her symptoms.    3. Depression.  Pt reports she is still doing well with venlafaxine.    The following portions of the patient's history were reviewed and updated as appropriate: allergies, current medications, past family history, past medical history, past social history, past surgical history and problem list.    Allergies: Sulfa antibiotics    Review of Systems   Constitutional: Negative.    HENT: Negative.    Eyes: Negative.    Respiratory: Negative.    Cardiovascular: Negative.    Gastrointestinal: Negative.    Endocrine: Negative.    Genitourinary: Negative.    Musculoskeletal: Negative.    Skin: Negative.    Allergic/Immunologic: Positive for environmental allergies.   Neurological: Negative.    Hematological: Negative.    Psychiatric/Behavioral: Negative.        Objective     Wt Readings from Last 3 Encounters:   03/26/18 73.9 kg (163 lb)   12/13/17 72.1 kg (159 lb)   09/27/17 70.6 kg (155 lb 9.6 oz)     Temp Readings from Last 3 Encounters:   03/26/18 98.2 °F (36.8 °C)   12/13/17 98.2 °F (36.8 °C) (Oral)   09/27/17 98.2 °F (36.8 °C)     BP Readings from Last 3 Encounters:   03/26/18 140/80   12/13/17 151/90   09/27/17 140/80     Pulse Readings from Last 3 Encounters:   03/26/18 73   12/13/17 62   09/27/17 72     Body mass index is 29.81 kg/m².  SpO2 Readings from Last 3 Encounters:   09/27/17 98%   03/29/17 98%   09/28/16 97%       Physical Exam   Constitutional: She is oriented to person, place, and time.  She appears well-developed and well-nourished.   HENT:   Head: Normocephalic.   Mouth/Throat: Oropharynx is clear and moist.   Eyes: Conjunctivae and EOM are normal. Pupils are equal, round, and reactive to light.   Neck: Normal range of motion. Neck supple. No thyromegaly present.   Cardiovascular: Normal rate, regular rhythm and normal heart sounds.    Pulmonary/Chest: Effort normal and breath sounds normal.   Abdominal: Soft. Bowel sounds are normal. There is no hepatosplenomegaly.   Musculoskeletal: Normal range of motion. She exhibits no edema.   Lymphadenopathy:     She has no cervical adenopathy.   Neurological: She is alert and oriented to person, place, and time.   Skin: Skin is warm and dry. No rash noted.   Psychiatric: She has a normal mood and affect. Her behavior is normal.   Vitals reviewed.      Results for orders placed or performed in visit on 03/14/18   Lipid Panel With LDL / HDL Ratio   Result Value Ref Range    Total Cholesterol 252 (H) 0 - 200 mg/dL    Triglycerides 235 (H) 0 - 150 mg/dL    HDL Cholesterol 58 40 - 60 mg/dL    VLDL Cholesterol 47 (H) 7 - 27 mg/dL    LDL Cholesterol  147 (H) 0 - 100 mg/dL    LDL/HDL Ratio 2.53    TSH   Result Value Ref Range    TSH 0.122 (L) 0.270 - 4.200 mIU/mL   T4, free   Result Value Ref Range    Free T4 1.50 0.93 - 1.70 ng/dL   Comprehensive metabolic panel   Result Value Ref Range    Glucose 97 65 - 99 mg/dL    BUN 8 8 - 23 mg/dL    Creatinine 0.49 (L) 0.57 - 1.00 mg/dL    eGFR Non African Am 126 >60 mL/min/1.73    eGFR African Am >150 >60 mL/min/1.73    BUN/Creatinine Ratio 16.3 7.0 - 25.0    Sodium 138 136 - 145 mmol/L    Potassium 4.4 3.5 - 5.2 mmol/L    Chloride 102 98 - 107 mmol/L    Total CO2 23.4 22.0 - 29.0 mmol/L    Calcium 9.5 8.8 - 10.5 mg/dL    Total Protein 7.3 6.0 - 8.5 g/dL    Albumin 4.30 3.50 - 5.20 g/dL    Globulin 3.0 gm/dL    A/G Ratio 1.4 g/dL    Total Bilirubin 0.5 0.2 - 1.2 mg/dL    Alkaline Phosphatase 57 40 - 129 U/L    AST (SGOT)  18 5 - 32 U/L    ALT (SGPT) 10 5 - 33 U/L   Vitamin B12   Result Value Ref Range    Vitamin B-12 829 232 - 1,245 pg/mL   CBC w AUTO Differential   Result Value Ref Range    WBC 3.83 (L) 4.80 - 10.80 10*3/mm3    RBC 3.71 (L) 4.20 - 5.40 10*6/mm3    Hemoglobin 13.1 12.0 - 16.0 g/dL    Hematocrit 38.4 37.0 - 47.0 %    .5 (H) 81.0 - 99.0 fL    MCH 35.3 (H) 27.0 - 31.0 pg    MCHC 34.1 31.0 - 37.0 g/dL    RDW 12.7 11.5 - 14.5 %    Platelets 294 140 - 500 10*3/mm3    Neutrophil Rel % 50.3 45.0 - 70.0 %    Lymphocyte Rel % 30.0 20.0 - 45.0 %    Monocyte Rel % 13.1 (H) 3.0 - 8.0 %    Eosinophil Rel % 5.5 (H) 0.0 - 4.0 %    Basophil Rel % 0.8 0.0 - 2.0 %    Neutrophils Absolute 1.93 1.50 - 8.30 10*3/mm3    Lymphocytes Absolute 1.15 0.60 - 4.80 10*3/mm3    Monocytes Absolute 0.50 0.00 - 1.00 10*3/mm3    Eosinophils Absolute 0.21 0.10 - 0.30 10*3/mm3    Basophils Absolute 0.03 0.00 - 0.20 10*3/mm3    Immature Granulocyte Rel % 0.3 0.0 - 0.5 %    Immature Grans Absolute 0.01 0.00 - 0.03 10*3/mm3    nRBC 0.0 0.0 - 0.0 /100 WBC       Assessment/Plan   Yelena was seen today for hyperlipidemia.    Diagnoses and all orders for this visit:    Hyperlipidemia, unspecified hyperlipidemia type  -     Comprehensive Metabolic Panel; Future  -     Lipid Panel With / Chol / HDL Ratio; Future    Hypothyroidism, unspecified type  -     TSH; Future  -     T4, Free; Future    Depression, unspecified depression type    Osteopenia of spine    Macrocytosis without anemia  -     CBC & Differential; Future  -     Vitamin B12; Future  -     Folate RBC; Future    Ulcerative colitis with complication, unspecified location    Routine health maintenance  -     Hepatitis C Antibody; Future    1. Hyperlipidemia.  Off statin.  10 year risk 7%.  She will treat this with lifestyle measures for now and will start red rice yeast.    2. Hypothyroidism.  TSH has been suppressed over the last several blood draws.  Decrease levothyroxine from 112 to 100 mcg  daily.    3. Depression.  Controlled.  Continue venlafaxine and lifestyle measures.    4. Osteopenia.  Calcium, vitamin D, and weight bearing exercise.  Next dexa scan 11/2018.    5. Macrocytosis. B12 and folate normal.  This started after being put on the Azasan.  She may go off this since Remicade is working.    6. Ulcerative colitis.  Improved with Remicade.  Continue per Dr. Saldaña.    7. Routine health maint.  Prevnar and flu vaccine UTD.  Mammogram UTD.  Pneumovax next time.      Outpatient Medications Prior to Visit   Medication Sig Dispense Refill   • AZASAN 75 MG tablet 75 mg daily.     • cetirizine (ZyrTEC) 10 MG tablet Take 10 mg by mouth daily.     • diclofenac (VOLTAREN) 1 % gel gel Apply 4 g topically as needed.     • hydrocortisone (ANUCORT-HC) 25 MG suppository Insert 25 mg into the rectum daily.     • levothyroxine (SYNTHROID, LEVOTHROID) 112 MCG tablet TAKE ONE TABLET BY MOUTH DAILY 90 tablet 1   • mesalamine (LIALDA) 1.2 G EC tablet Take  by mouth.     • multivitamin (DAILY RAFFY) tablet tablet Take 1 tablet by mouth daily.     • omeprazole (priLOSEC) 20 MG capsule TAKE ONE CAPSULE BY MOUTH DAILY 90 capsule 1   • venlafaxine XR (EFFEXOR-XR) 75 MG 24 hr capsule TAKE ONE CAPSULE BY MOUTH DAILY 90 capsule 1     No facility-administered medications prior to visit.      No orders of the defined types were placed in this encounter.    [unfilled]  There are no discontinued medications.    Return in about 6 months (around 9/26/2018).

## 2018-05-16 ENCOUNTER — OFFICE (OUTPATIENT)
Dept: URBAN - METROPOLITAN AREA INFUSION 3 | Facility: INFUSION | Age: 68
End: 2018-05-16

## 2018-05-16 VITALS
DIASTOLIC BLOOD PRESSURE: 79 MMHG | DIASTOLIC BLOOD PRESSURE: 76 MMHG | SYSTOLIC BLOOD PRESSURE: 129 MMHG | HEART RATE: 69 BPM | HEART RATE: 68 BPM | DIASTOLIC BLOOD PRESSURE: 80 MMHG | WEIGHT: 150 LBS | DIASTOLIC BLOOD PRESSURE: 78 MMHG | HEART RATE: 83 BPM | SYSTOLIC BLOOD PRESSURE: 119 MMHG | HEART RATE: 67 BPM | SYSTOLIC BLOOD PRESSURE: 118 MMHG | HEART RATE: 78 BPM | TEMPERATURE: 97 F | DIASTOLIC BLOOD PRESSURE: 75 MMHG | SYSTOLIC BLOOD PRESSURE: 123 MMHG | HEART RATE: 66 BPM | DIASTOLIC BLOOD PRESSURE: 81 MMHG | SYSTOLIC BLOOD PRESSURE: 111 MMHG | SYSTOLIC BLOOD PRESSURE: 125 MMHG | SYSTOLIC BLOOD PRESSURE: 114 MMHG | TEMPERATURE: 97.3 F | DIASTOLIC BLOOD PRESSURE: 83 MMHG | HEIGHT: 62 IN | SYSTOLIC BLOOD PRESSURE: 117 MMHG | HEART RATE: 64 BPM | RESPIRATION RATE: 16 BRPM

## 2018-05-16 DIAGNOSIS — K51.30 ULCERATIVE (CHRONIC) RECTOSIGMOIDITIS WITHOUT COMPLICATIONS: ICD-10-CM

## 2018-05-16 PROCEDURE — 96415 CHEMO IV INFUSION ADDL HR: CPT | Performed by: INTERNAL MEDICINE

## 2018-05-16 PROCEDURE — 96413 CHEMO IV INFUSION 1 HR: CPT | Performed by: INTERNAL MEDICINE

## 2018-05-16 NOTE — SERVICENOTES
Pt took Tylenol 1000mg and Zyrtec 10mg this am, no premeds given here
MEDS PROVIDED BY [facility]
NEGATIVE PPD or Quantiferon Gold: Annual 12/17

## 2018-05-24 ENCOUNTER — OFFICE VISIT (OUTPATIENT)
Dept: GASTROENTEROLOGY | Facility: CLINIC | Age: 68
End: 2018-05-24

## 2018-05-24 VITALS
BODY MASS INDEX: 29.88 KG/M2 | DIASTOLIC BLOOD PRESSURE: 84 MMHG | SYSTOLIC BLOOD PRESSURE: 136 MMHG | WEIGHT: 162.4 LBS | HEIGHT: 62 IN

## 2018-05-24 DIAGNOSIS — K51.311 ULCERATIVE RECTOSIGMOIDITIS WITH RECTAL BLEEDING (HCC): Primary | ICD-10-CM

## 2018-05-24 PROCEDURE — 99213 OFFICE O/P EST LOW 20 MIN: CPT | Performed by: INTERNAL MEDICINE

## 2018-05-24 NOTE — PROGRESS NOTES
PATIENT INFORMATION  Yelena Rangel       - 1950    CHIEF COMPLAINT  Chief Complaint   Patient presents with   • Ulcerative Colitis       HISTORY OF PRESENT ILLNESS  Much better on Remicade and no infusion issues. Still on Lialda and Azasan and her last infusion was a week ago.   Reviewed her CBC and her WBC is persistantly low but still.greater than 3  Was told by [facility] that she couldn't return with out changing MDs!      Ulcerative Colitis   This is a chronic problem. The current episode started more than 1 year ago. The problem occurs daily. The problem has been resolved. Pertinent negatives include no abdominal pain, joint swelling or myalgias. Treatments tried: Has been bumped up to Biologics from Mesalamine and Immuran. The treatment provided significant relief.           REVIEW OF SYSTEMS  Review of Systems   Gastrointestinal: Negative for abdominal pain.   Musculoskeletal: Negative for joint swelling and myalgias.   All other systems reviewed and are negative.        ACTIVE PROBLEMS  Patient Active Problem List    Diagnosis   • Macrocytosis without anemia [D75.89]   • Depression [F32.9]   • Hyperlipidemia [E78.5]   • Hypothyroidism [E03.9]     Overview Note:     S/p right thyroidectomy for mass around .  Dr. Collins.     • Osteopenia [M85.80]     Overview Note:     Dexa scan 2016.     • Liseth-Parkinson-White (WPW) pattern [I45.6]     Overview Note:     No longer seeing cardiologist.     • Ulcerative colitis [K51.90]     Overview Note:     Dr. Saldaña.     • Allergic rhinitis [J30.9]   • Primary osteoarthritis of knees, bilateral [M17.0]         PAST MEDICAL HISTORY  Past Medical History:   Diagnosis Date   • Arthritis    • Disease of thyroid gland    • Hyperlipidemia    • Hypothyroidism    • Ulcerative colitis    • WPW (Liseth-Parkinson-White syndrome)          SURGICAL HISTORY  Past Surgical History:   Procedure Laterality Date   • COLONOSCOPY N/A 2016    Procedure: COLONOSCOPY with  biopsies;  Surgeon: Dl Saldaña MD;  Location: [facility] OR;  Service:    • COLONOSCOPY W/ POLYPECTOMY     • HYSTERECTOMY     • SIGMOIDOSCOPY N/A 12/13/2017    Procedure: SIGMOIDOSCOPY FLEXIBLE with biopsy;  Surgeon: Dl Saldaña MD;  Location: [facility] OR;  Service:    • THYROID LOBECTOMY           FAMILY HISTORY  Family History   Problem Relation Age of Onset   • Breast cancer Mother    • Breast cancer Sister          SOCIAL HISTORY  Social History     Occupational History   • Not on file.     Social History Main Topics   • Smoking status: Never Smoker   • Smokeless tobacco: Never Used   • Alcohol use Yes      Comment: twice week   • Drug use: No   • Sexual activity: Defer         CURRENT MEDICATIONS    Current Outpatient Prescriptions:   •  AZASAN 75 MG tablet, 75 mg daily., Disp: , Rfl:   •  Calcium Carb-Cholecalciferol 1000-800 MG-UNIT tablet, Take  by mouth., Disp: , Rfl:   •  cetirizine (ZyrTEC) 10 MG tablet, Take 10 mg by mouth daily., Disp: , Rfl:   •  diclofenac (VOLTAREN) 1 % gel gel, Apply 4 g topically as needed., Disp: , Rfl:   •  hydrocortisone (ANUCORT-HC) 25 MG suppository, Insert 25 mg into the rectum daily., Disp: , Rfl:   •  InFLIXimab (REMICADE IV), Infuse 1 dose into a venous catheter Every 30 (Thirty) Days. Every 60 days.  Dr. Saldaña., Disp: , Rfl:   •  levothyroxine (SYNTHROID, LEVOTHROID) 100 MCG tablet, Take 1 tablet by mouth Daily., Disp: 90 tablet, Rfl: 1  •  mesalamine (LIALDA) 1.2 G EC tablet, Take  by mouth., Disp: , Rfl:   •  multivitamin (DAILY RAFFY) tablet tablet, Take 1 tablet by mouth daily., Disp: , Rfl:   •  Nutritional Supplements (VITAMIN D MAINTENANCE PO), Take  by mouth., Disp: , Rfl:   •  omeprazole (priLOSEC) 20 MG capsule, TAKE ONE CAPSULE BY MOUTH DAILY, Disp: 90 capsule, Rfl: 1  •  venlafaxine XR (EFFEXOR-XR) 75 MG 24 hr capsule, TAKE ONE CAPSULE BY MOUTH DAILY, Disp: 90 capsule, Rfl: 1    ALLERGIES  Sulfa antibiotics    VITALS  Vitals:     "05/24/18 0910   BP: 136/84   Weight: 73.7 kg (162 lb 6.4 oz)   Height: 157.5 cm (62.01\")       LAST RESULTS   Lab on 03/14/2018   Component Date Value Ref Range Status   • Total Cholesterol 03/14/2018 252* 0 - 200 mg/dL Final   • Triglycerides 03/14/2018 235* 0 - 150 mg/dL Final   • HDL Cholesterol 03/14/2018 58  40 - 60 mg/dL Final   • VLDL Cholesterol 03/14/2018 47* 7 - 27 mg/dL Final   • LDL Cholesterol  03/14/2018 147* 0 - 100 mg/dL Final   • LDL/HDL Ratio 03/14/2018 2.53   Final   • WBC 03/14/2018 3.83* 4.80 - 10.80 10*3/mm3 Final   • RBC 03/14/2018 3.71* 4.20 - 5.40 10*6/mm3 Final   • Hemoglobin 03/14/2018 13.1  12.0 - 16.0 g/dL Final   • Hematocrit 03/14/2018 38.4  37.0 - 47.0 % Final   • MCV 03/14/2018 103.5* 81.0 - 99.0 fL Final   • MCH 03/14/2018 35.3* 27.0 - 31.0 pg Final   • MCHC 03/14/2018 34.1  31.0 - 37.0 g/dL Final   • RDW 03/14/2018 12.7  11.5 - 14.5 % Final   • Platelets 03/14/2018 294  140 - 500 10*3/mm3 Final   • Neutrophil Rel % 03/14/2018 50.3  45.0 - 70.0 % Final   • Lymphocyte Rel % 03/14/2018 30.0  20.0 - 45.0 % Final   • Monocyte Rel % 03/14/2018 13.1* 3.0 - 8.0 % Final   • Eosinophil Rel % 03/14/2018 5.5* 0.0 - 4.0 % Final   • Basophil Rel % 03/14/2018 0.8  0.0 - 2.0 % Final   • Neutrophils Absolute 03/14/2018 1.93  1.50 - 8.30 10*3/mm3 Final   • Lymphocytes Absolute 03/14/2018 1.15  0.60 - 4.80 10*3/mm3 Final   • Monocytes Absolute 03/14/2018 0.50  0.00 - 1.00 10*3/mm3 Final   • Eosinophils Absolute 03/14/2018 0.21  0.10 - 0.30 10*3/mm3 Final   • Basophils Absolute 03/14/2018 0.03  0.00 - 0.20 10*3/mm3 Final   • Immature Granulocyte Rel % 03/14/2018 0.3  0.0 - 0.5 % Final   • Immature Grans Absolute 03/14/2018 0.01  0.00 - 0.03 10*3/mm3 Final   • nRBC 03/14/2018 0.0  0.0 - 0.0 /100 WBC Final   • TSH 03/14/2018 0.122* 0.270 - 4.200 mIU/mL Final   • Free T4 03/14/2018 1.50  0.93 - 1.70 ng/dL Final   • Glucose 03/14/2018 97  65 - 99 mg/dL Final   • BUN 03/14/2018 8  8 - 23 mg/dL Final   • " Creatinine 03/14/2018 0.49* 0.57 - 1.00 mg/dL Final   • eGFR Non African Am 03/14/2018 126  >60 mL/min/1.73 Final   • eGFR African Am 03/14/2018 >150  >60 mL/min/1.73 Final   • BUN/Creatinine Ratio 03/14/2018 16.3  7.0 - 25.0 Final   • Sodium 03/14/2018 138  136 - 145 mmol/L Final   • Potassium 03/14/2018 4.4  3.5 - 5.2 mmol/L Final   • Chloride 03/14/2018 102  98 - 107 mmol/L Final   • Total CO2 03/14/2018 23.4  22.0 - 29.0 mmol/L Final   • Calcium 03/14/2018 9.5  8.8 - 10.5 mg/dL Final   • Total Protein 03/14/2018 7.3  6.0 - 8.5 g/dL Final   • Albumin 03/14/2018 4.30  3.50 - 5.20 g/dL Final   • Globulin 03/14/2018 3.0  gm/dL Final   • A/G Ratio 03/14/2018 1.4  g/dL Final   • Total Bilirubin 03/14/2018 0.5  0.2 - 1.2 mg/dL Final   • Alkaline Phosphatase 03/14/2018 57  40 - 129 U/L Final   • AST (SGOT) 03/14/2018 18  5 - 32 U/L Final   • ALT (SGPT) 03/14/2018 10  5 - 33 U/L Final   • Vitamin B-12 03/14/2018 829  232 - 1,245 pg/mL Final     No results found.    PHYSICAL EXAM  Physical Exam   Constitutional: She is oriented to person, place, and time. She appears well-developed and well-nourished.   HENT:   Head: Normocephalic.   Eyes: Conjunctivae are normal. No scleral icterus.   Neck: Normal range of motion. Neck supple. No thyromegaly present.   Cardiovascular: Normal rate, regular rhythm, normal heart sounds and intact distal pulses.  Exam reveals no gallop.    No murmur heard.  Pulmonary/Chest: Effort normal and breath sounds normal. She has no wheezes. She has no rales.   Abdominal: Soft. Bowel sounds are normal. She exhibits no shifting dullness, no distension, no fluid wave, no abdominal bruit, no ascites and no mass. There is no hepatosplenomegaly. There is no tenderness. There is no guarding and negative Sorto's sign. Hernia confirmed negative in the ventral area.   Musculoskeletal: Normal range of motion. She exhibits no edema.   Lymphadenopathy:     She has no cervical adenopathy.   Neurological: She is  alert and oriented to person, place, and time.   Skin: Skin is warm and dry. No rash noted. She is not diaphoretic. No erythema.   Psychiatric: She has a normal mood and affect. Judgment normal.       ASSESSMENT  Diagnoses and all orders for this visit:    Ulcerative rectosigmoiditis with rectal bleeding    Will have her wean down to Lialda 2 a day over the nesxt month and continue the Azasan  Will need a new order for Remicade and location      PLAN  Return in about 4 months (around 9/24/2018).

## 2018-05-29 RX ORDER — VENLAFAXINE HYDROCHLORIDE 75 MG/1
CAPSULE, EXTENDED RELEASE ORAL
Qty: 90 CAPSULE | Refills: 1 | Status: SHIPPED | OUTPATIENT
Start: 2018-05-29 | End: 2018-12-02 | Stop reason: SDUPTHER

## 2018-05-31 DIAGNOSIS — K51.011 ULCERATIVE PANCOLITIS WITH RECTAL BLEEDING (HCC): Primary | ICD-10-CM

## 2018-05-31 RX ORDER — ACETAMINOPHEN 325 MG/1
650 TABLET ORAL ONCE
Status: CANCELLED | OUTPATIENT
Start: 2018-07-06

## 2018-05-31 RX ORDER — DIPHENHYDRAMINE HYDROCHLORIDE 50 MG/ML
50 INJECTION INTRAMUSCULAR; INTRAVENOUS ONCE
Status: CANCELLED | OUTPATIENT
Start: 2018-07-06

## 2018-07-09 RX ORDER — OMEPRAZOLE 20 MG/1
CAPSULE, DELAYED RELEASE ORAL
Qty: 90 CAPSULE | Refills: 1 | Status: SHIPPED | OUTPATIENT
Start: 2018-07-09 | End: 2018-12-14 | Stop reason: SDUPTHER

## 2018-07-11 ENCOUNTER — HOSPITAL ENCOUNTER (OUTPATIENT)
Dept: INFUSION THERAPY | Facility: HOSPITAL | Age: 68
Discharge: HOME | End: 2018-07-11
Attending: INTERNAL MEDICINE | Admitting: INTERNAL MEDICINE

## 2018-07-11 VITALS
HEIGHT: 62 IN | BODY MASS INDEX: 29.74 KG/M2 | HEART RATE: 70 BPM | TEMPERATURE: 98 F | RESPIRATION RATE: 16 BRPM | OXYGEN SATURATION: 96 % | DIASTOLIC BLOOD PRESSURE: 82 MMHG | WEIGHT: 161.6 LBS | SYSTOLIC BLOOD PRESSURE: 126 MMHG

## 2018-07-11 DIAGNOSIS — K51.311 ULCERATIVE RECTOSIGMOIDITIS WITH RECTAL BLEEDING (HCC): Primary | ICD-10-CM

## 2018-07-11 PROCEDURE — 96413 CHEMO IV INFUSION 1 HR: CPT

## 2018-07-11 PROCEDURE — 25010000002 INFLIXIMAB PER 10 MG: Performed by: INTERNAL MEDICINE

## 2018-07-11 PROCEDURE — 96415 CHEMO IV INFUSION ADDL HR: CPT

## 2018-07-11 RX ORDER — ACETAMINOPHEN 325 MG/1
650 TABLET ORAL ONCE
Status: DISCONTINUED | OUTPATIENT
Start: 2018-07-11 | End: 2018-07-13 | Stop reason: HOSPADM

## 2018-07-11 RX ORDER — DIPHENHYDRAMINE HYDROCHLORIDE 50 MG/ML
50 INJECTION INTRAMUSCULAR; INTRAVENOUS ONCE
Status: DISCONTINUED | OUTPATIENT
Start: 2018-07-11 | End: 2018-07-13 | Stop reason: HOSPADM

## 2018-07-11 RX ORDER — DIPHENHYDRAMINE HYDROCHLORIDE 50 MG/ML
50 INJECTION INTRAMUSCULAR; INTRAVENOUS ONCE
Status: CANCELLED | OUTPATIENT
Start: 2018-07-11

## 2018-07-11 RX ORDER — ACETAMINOPHEN 325 MG/1
650 TABLET ORAL ONCE
Status: CANCELLED | OUTPATIENT
Start: 2018-07-11

## 2018-07-11 RX ADMIN — INFLIXIMAB 733 MG: 100 INJECTION, POWDER, LYOPHILIZED, FOR SOLUTION INTRAVENOUS at 09:35

## 2018-07-11 NOTE — NURSING NOTE
NURSING PROGRESS NOTE:    PATIENT ARRIVE TO [facility] FOR SCHEDULED INFUSION AT 0835 .  PROCEDURE WAS PERFORMED WITHOUT INCIDENT. AVS REVIEWED PRIOR TO DISCHARGE.  PATIENT DISCHARGED HOME WITHOUYT C/O AT 1150 . KAILYN LEE

## 2018-09-05 ENCOUNTER — HOSPITAL ENCOUNTER (OUTPATIENT)
Dept: INFUSION THERAPY | Facility: HOSPITAL | Age: 68
Discharge: HOME | End: 2018-09-05
Attending: INTERNAL MEDICINE | Admitting: INTERNAL MEDICINE

## 2018-09-05 VITALS
OXYGEN SATURATION: 96 % | BODY MASS INDEX: 29.74 KG/M2 | HEART RATE: 66 BPM | RESPIRATION RATE: 16 BRPM | DIASTOLIC BLOOD PRESSURE: 75 MMHG | SYSTOLIC BLOOD PRESSURE: 127 MMHG | WEIGHT: 161.6 LBS | TEMPERATURE: 97.6 F | HEIGHT: 62 IN

## 2018-09-05 DIAGNOSIS — K51.311 ULCERATIVE RECTOSIGMOIDITIS WITH RECTAL BLEEDING (HCC): Primary | ICD-10-CM

## 2018-09-05 PROCEDURE — 96415 CHEMO IV INFUSION ADDL HR: CPT

## 2018-09-05 PROCEDURE — 96365 THER/PROPH/DIAG IV INF INIT: CPT

## 2018-09-05 PROCEDURE — 25010000002 INFLIXIMAB PER 10 MG: Performed by: INTERNAL MEDICINE

## 2018-09-05 PROCEDURE — 96366 THER/PROPH/DIAG IV INF ADDON: CPT

## 2018-09-05 PROCEDURE — 96413 CHEMO IV INFUSION 1 HR: CPT

## 2018-09-05 RX ORDER — DIPHENHYDRAMINE HYDROCHLORIDE 50 MG/ML
50 INJECTION INTRAMUSCULAR; INTRAVENOUS ONCE
Status: CANCELLED | OUTPATIENT
Start: 2018-09-05

## 2018-09-05 RX ORDER — ACETAMINOPHEN 325 MG/1
650 TABLET ORAL ONCE
Status: CANCELLED | OUTPATIENT
Start: 2018-09-05

## 2018-09-05 RX ADMIN — INFLIXIMAB 733 MG: 100 INJECTION, POWDER, LYOPHILIZED, FOR SOLUTION INTRAVENOUS at 09:25

## 2018-09-05 NOTE — PATIENT INSTRUCTIONS
"  Call Dr. Dl Saldaña, Gastroenterology at [phone] if you have any problems or concerns.    We know you have a Choice in healthcare and appreciate you using [facility].  Our purpose is to provide you \"Excellent Care\".  We hope that you will always choose us in the future and continue to recommend us to your family and friends.              "

## 2018-09-05 NOTE — NURSING NOTE
NURSING PROGRESS NOTE:    PATIENT ARRIVE TO [facility] FOR SCHEDULED INFUSION AT 0830 . PER-MEDS HELD AS PATIENT STATED SHE TOOK TYLENOL 100MG AND ORAL ALLERGY MEDICINE PRIOR TO ARRIVAL.  PROCEDURE WAS PERFORMED WITHOUT INCIDENT.   PATIENT DISCHARGED HOME WITHOUT C/O AT 1135 . KAILYN LEE

## 2018-09-11 ENCOUNTER — TRANSCRIBE ORDERS (OUTPATIENT)
Dept: ADMINISTRATIVE | Facility: HOSPITAL | Age: 68
End: 2018-09-11

## 2018-09-11 DIAGNOSIS — Z12.39 SCREENING BREAST EXAMINATION: Primary | ICD-10-CM

## 2018-09-16 DIAGNOSIS — E03.9 HYPOTHYROIDISM, UNSPECIFIED TYPE: ICD-10-CM

## 2018-09-17 RX ORDER — LEVOTHYROXINE SODIUM 0.1 MG/1
TABLET ORAL
Qty: 90 TABLET | Refills: 0 | Status: SHIPPED | OUTPATIENT
Start: 2018-09-17 | End: 2018-12-02 | Stop reason: SDUPTHER

## 2018-09-19 ENCOUNTER — LAB (OUTPATIENT)
Dept: INTERNAL MEDICINE | Facility: CLINIC | Age: 68
End: 2018-09-19

## 2018-09-19 ENCOUNTER — HOSPITAL ENCOUNTER (OUTPATIENT)
Dept: MAMMOGRAPHY | Facility: HOSPITAL | Age: 68
Discharge: HOME | End: 2018-09-19
Attending: FAMILY MEDICINE | Admitting: FAMILY MEDICINE

## 2018-09-19 DIAGNOSIS — Z12.39 SCREENING BREAST EXAMINATION: ICD-10-CM

## 2018-09-19 DIAGNOSIS — Z00.00 ROUTINE HEALTH MAINTENANCE: ICD-10-CM

## 2018-09-19 DIAGNOSIS — E03.9 HYPOTHYROIDISM, UNSPECIFIED TYPE: ICD-10-CM

## 2018-09-19 DIAGNOSIS — M85.88 OSTEOPENIA OF SPINE: ICD-10-CM

## 2018-09-19 DIAGNOSIS — F32.A DEPRESSION, UNSPECIFIED DEPRESSION TYPE: ICD-10-CM

## 2018-09-19 DIAGNOSIS — D75.89 MACROCYTOSIS WITHOUT ANEMIA: ICD-10-CM

## 2018-09-19 DIAGNOSIS — E78.5 HYPERLIPIDEMIA, UNSPECIFIED HYPERLIPIDEMIA TYPE: ICD-10-CM

## 2018-09-19 DIAGNOSIS — I45.6 WOLFF-PARKINSON-WHITE (WPW) PATTERN: ICD-10-CM

## 2018-09-19 DIAGNOSIS — E78.5 HYPERLIPIDEMIA, UNSPECIFIED HYPERLIPIDEMIA TYPE: Primary | ICD-10-CM

## 2018-09-19 PROCEDURE — 77067 SCR MAMMO BI INCL CAD: CPT

## 2018-09-19 PROCEDURE — 77063 BREAST TOMOSYNTHESIS BI: CPT

## 2018-09-20 LAB
ALBUMIN SERPL-MCNC: 4.5 G/DL (ref 3.5–5.2)
ALBUMIN/GLOB SERPL: 1.2 G/DL
ALP SERPL-CCNC: 54 U/L (ref 40–129)
ALT SERPL-CCNC: 11 U/L (ref 5–33)
AST SERPL-CCNC: 19 U/L (ref 5–32)
BASOPHILS # BLD AUTO: 0.03 10*3/MM3 (ref 0–0.2)
BASOPHILS NFR BLD AUTO: 0.9 % (ref 0–2)
BILIRUB SERPL-MCNC: 0.5 MG/DL (ref 0.2–1.2)
BUN SERPL-MCNC: 9 MG/DL (ref 8–23)
BUN/CREAT SERPL: 15.8 (ref 7–25)
CALCIUM SERPL-MCNC: 9.5 MG/DL (ref 8.8–10.5)
CHLORIDE SERPL-SCNC: 98 MMOL/L (ref 98–107)
CHOLEST SERPL-MCNC: 275 MG/DL (ref 0–200)
CHOLEST/HDLC SERPL: 4.04 {RATIO}
CO2 SERPL-SCNC: 27.5 MMOL/L (ref 22–29)
CREAT SERPL-MCNC: 0.57 MG/DL (ref 0.57–1)
EOSINOPHIL # BLD AUTO: 0.19 10*3/MM3 (ref 0.1–0.3)
EOSINOPHIL NFR BLD AUTO: 5.7 % (ref 0–4)
ERYTHROCYTE [DISTWIDTH] IN BLOOD BY AUTOMATED COUNT: 13.1 % (ref 11.5–14.5)
FOLATE BLD-MCNC: 429 NG/ML
FOLATE RBC-MCNC: 1073 NG/ML
GLOBULIN SER CALC-MCNC: 3.8 GM/DL
GLUCOSE SERPL-MCNC: 94 MG/DL (ref 65–99)
HCT VFR BLD AUTO: 40 % (ref 37–47)
HCV AB S/CO SERPL IA: <0.1 S/CO RATIO (ref 0–0.9)
HDLC SERPL-MCNC: 68 MG/DL (ref 40–60)
HGB BLD-MCNC: 13.1 G/DL (ref 12–16)
IMM GRANULOCYTES # BLD: 0.01 10*3/MM3 (ref 0–0.03)
IMM GRANULOCYTES NFR BLD: 0.3 % (ref 0–0.5)
LDLC SERPL CALC-MCNC: 159 MG/DL (ref 0–100)
LYMPHOCYTES # BLD AUTO: 0.94 10*3/MM3 (ref 0.6–4.8)
LYMPHOCYTES NFR BLD AUTO: 28.1 % (ref 20–45)
MCH RBC QN AUTO: 34.1 PG (ref 27–31)
MCHC RBC AUTO-ENTMCNC: 32.8 G/DL (ref 31–37)
MCV RBC AUTO: 104.2 FL (ref 81–99)
MONOCYTES # BLD AUTO: 0.45 10*3/MM3 (ref 0–1)
MONOCYTES NFR BLD AUTO: 13.5 % (ref 3–8)
NEUTROPHILS # BLD AUTO: 1.72 10*3/MM3 (ref 1.5–8.3)
NEUTROPHILS NFR BLD AUTO: 51.5 % (ref 45–70)
NRBC BLD AUTO-RTO: 0 /100 WBC (ref 0–0)
PLATELET # BLD AUTO: 321 10*3/MM3 (ref 140–500)
POTASSIUM SERPL-SCNC: 4.4 MMOL/L (ref 3.5–5.2)
PROT SERPL-MCNC: 8.3 G/DL (ref 6–8.5)
RBC # BLD AUTO: 3.84 10*6/MM3 (ref 4.2–5.4)
SODIUM SERPL-SCNC: 136 MMOL/L (ref 136–145)
T4 FREE SERPL-MCNC: 1.49 NG/DL (ref 0.93–1.7)
TRIGL SERPL-MCNC: 239 MG/DL (ref 0–150)
TSH SERPL DL<=0.005 MIU/L-ACNC: 0.41 MIU/ML (ref 0.27–4.2)
VIT B12 SERPL-MCNC: 639 PG/ML
VLDLC SERPL CALC-MCNC: 47.8 MG/DL (ref 7–27)
WBC # BLD AUTO: 3.34 10*3/MM3 (ref 4.8–10.8)

## 2018-09-26 ENCOUNTER — OFFICE VISIT (OUTPATIENT)
Dept: INTERNAL MEDICINE | Facility: CLINIC | Age: 68
End: 2018-09-26

## 2018-09-26 VITALS
RESPIRATION RATE: 16 BRPM | SYSTOLIC BLOOD PRESSURE: 140 MMHG | BODY MASS INDEX: 29.81 KG/M2 | HEIGHT: 62 IN | TEMPERATURE: 98.2 F | WEIGHT: 162 LBS | DIASTOLIC BLOOD PRESSURE: 82 MMHG

## 2018-09-26 DIAGNOSIS — E03.9 HYPOTHYROIDISM, UNSPECIFIED TYPE: ICD-10-CM

## 2018-09-26 DIAGNOSIS — K51.311 ULCERATIVE RECTOSIGMOIDITIS WITH RECTAL BLEEDING (HCC): ICD-10-CM

## 2018-09-26 DIAGNOSIS — Z23 NEED FOR INFLUENZA VACCINATION: ICD-10-CM

## 2018-09-26 DIAGNOSIS — Z23 NEED FOR PNEUMOCOCCAL VACCINATION: ICD-10-CM

## 2018-09-26 DIAGNOSIS — M17.0 PRIMARY OSTEOARTHRITIS OF KNEES, BILATERAL: ICD-10-CM

## 2018-09-26 DIAGNOSIS — Z00.00 ROUTINE HEALTH MAINTENANCE: ICD-10-CM

## 2018-09-26 DIAGNOSIS — D75.89 MACROCYTOSIS WITHOUT ANEMIA: ICD-10-CM

## 2018-09-26 DIAGNOSIS — I45.6 WOLFF-PARKINSON-WHITE (WPW) PATTERN: ICD-10-CM

## 2018-09-26 DIAGNOSIS — E78.5 HYPERLIPIDEMIA, UNSPECIFIED HYPERLIPIDEMIA TYPE: Primary | ICD-10-CM

## 2018-09-26 DIAGNOSIS — M85.88 OSTEOPENIA OF SPINE: ICD-10-CM

## 2018-09-26 DIAGNOSIS — F32.A DEPRESSION, UNSPECIFIED DEPRESSION TYPE: ICD-10-CM

## 2018-09-26 PROCEDURE — G0008 ADMIN INFLUENZA VIRUS VAC: HCPCS | Performed by: FAMILY MEDICINE

## 2018-09-26 PROCEDURE — 90732 PPSV23 VACC 2 YRS+ SUBQ/IM: CPT | Performed by: FAMILY MEDICINE

## 2018-09-26 PROCEDURE — 99214 OFFICE O/P EST MOD 30 MIN: CPT | Performed by: FAMILY MEDICINE

## 2018-09-26 PROCEDURE — 90662 IIV NO PRSV INCREASED AG IM: CPT | Performed by: FAMILY MEDICINE

## 2018-09-26 PROCEDURE — G0009 ADMIN PNEUMOCOCCAL VACCINE: HCPCS | Performed by: FAMILY MEDICINE

## 2018-09-26 RX ORDER — SENNOSIDES 8.6 MG
650 CAPSULE ORAL EVERY 8 HOURS PRN
COMMUNITY
End: 2019-02-20

## 2018-09-26 NOTE — PROGRESS NOTES
Subjective     Yelena Rangel is a 67 y.o. female, who presents with a chief complaint of   Chief Complaint   Patient presents with   • Hyperlipidemia       Hyperlipidemia     1. Hyperlipidemia.  Doesn't tolerate statins.    2. Ulcerative colitis.  On Remicade every 2 months per Dr. Saldaña.  Also takes Lialda and Azasan.  She is feeling well.  Has f/u with Dr. Saldaña tomorrow.    3. Depression.  Pt reports she is still doing well with venlafaxine.    The following portions of the patient's history were reviewed and updated as appropriate: allergies, current medications, past family history, past medical history, past social history, past surgical history and problem list.    Allergies: Sulfa antibiotics    Review of Systems   Constitutional: Negative.    HENT: Negative.    Eyes: Negative.    Respiratory: Negative.    Cardiovascular: Negative.    Gastrointestinal: Negative.    Endocrine: Negative.    Genitourinary: Negative.    Musculoskeletal: Positive for arthralgias.   Skin: Negative.    Allergic/Immunologic: Positive for environmental allergies.   Neurological: Negative.    Hematological: Negative.    Psychiatric/Behavioral: Negative.        Objective     Wt Readings from Last 3 Encounters:   09/26/18 73.5 kg (162 lb)   09/05/18 73.3 kg (161 lb 9.6 oz)   07/11/18 73.3 kg (161 lb 9.6 oz)     Temp Readings from Last 3 Encounters:   09/26/18 98.2 °F (36.8 °C)   09/05/18 97.6 °F (36.4 °C) (Temporal Artery )   07/11/18 98 °F (36.7 °C) (Temporal Artery )     BP Readings from Last 3 Encounters:   09/26/18 140/82   09/05/18 127/75   07/11/18 126/82     Pulse Readings from Last 3 Encounters:   09/05/18 66   07/11/18 70   03/26/18 73     Body mass index is 29.63 kg/m².  SpO2 Readings from Last 3 Encounters:   09/27/17 98%   03/29/17 98%   09/28/16 97%       Physical Exam   Constitutional: She is oriented to person, place, and time. She appears well-developed and well-nourished.   HENT:   Head: Normocephalic.    Mouth/Throat: Oropharynx is clear and moist.   Eyes: Conjunctivae and EOM are normal.   Neck: Neck supple. No thyromegaly present.   Cardiovascular: Normal rate, regular rhythm and normal heart sounds.    Pulmonary/Chest: Effort normal and breath sounds normal.   Abdominal: Soft. Bowel sounds are normal. There is no hepatosplenomegaly.   Musculoskeletal: Normal range of motion. She exhibits no edema.   Neurological: She is alert and oriented to person, place, and time.   Skin: Skin is warm and dry. No rash noted.   Psychiatric: She has a normal mood and affect. Her behavior is normal.   Nursing note and vitals reviewed.      Results for orders placed or performed in visit on 09/19/18   Comprehensive Metabolic Panel   Result Value Ref Range    Glucose 94 65 - 99 mg/dL    BUN 9 8 - 23 mg/dL    Creatinine 0.57 0.57 - 1.00 mg/dL    eGFR Non African Am 106 >60 mL/min/1.73    eGFR African Am 129 >60 mL/min/1.73    BUN/Creatinine Ratio 15.8 7.0 - 25.0    Sodium 136 136 - 145 mmol/L    Potassium 4.4 3.5 - 5.2 mmol/L    Chloride 98 98 - 107 mmol/L    Total CO2 27.5 22.0 - 29.0 mmol/L    Calcium 9.5 8.8 - 10.5 mg/dL    Total Protein 8.3 6.0 - 8.5 g/dL    Albumin 4.50 3.50 - 5.20 g/dL    Globulin 3.8 gm/dL    A/G Ratio 1.2 g/dL    Total Bilirubin 0.5 0.2 - 1.2 mg/dL    Alkaline Phosphatase 54 40 - 129 U/L    AST (SGOT) 19 5 - 32 U/L    ALT (SGPT) 11 5 - 33 U/L   TSH   Result Value Ref Range    TSH 0.412 0.270 - 4.200 mIU/mL   Vitamin B12   Result Value Ref Range    Vitamin B-12 639 232-1,245 pg/mL   Lipid Panel With / Chol / HDL Ratio   Result Value Ref Range    Total Cholesterol 275 (H) 0 - 200 mg/dL    Triglycerides 239 (H) 0 - 150 mg/dL    HDL Cholesterol 68 (H) 40 - 60 mg/dL    VLDL Cholesterol 47.8 (H) 7 - 27 mg/dL    LDL Cholesterol  159 (H) 0 - 100 mg/dL    Chol/HDL Ratio 4.04    T4, Free   Result Value Ref Range    Free T4 1.49 0.93 - 1.70 ng/dL   Hepatitis C Antibody   Result Value Ref Range    Hep C Virus Ab <0.1  0.0 - 0.9 s/co ratio   Folate RBC   Result Value Ref Range    Folate, Hemolysate 429.0 Not Estab. ng/mL    RBC Folate 1,073 >498 ng/mL   CBC & Differential   Result Value Ref Range    WBC 3.34 (L) 4.80 - 10.80 10*3/mm3    RBC 3.84 (L) 4.20 - 5.40 10*6/mm3    Hemoglobin 13.1 12.0 - 16.0 g/dL    Hematocrit 40.0 37.0 - 47.0 %    .2 (H) 81.0 - 99.0 fL    MCH 34.1 (H) 27.0 - 31.0 pg    MCHC 32.8 31.0 - 37.0 g/dL    RDW 13.1 11.5 - 14.5 %    Platelets 321 140 - 500 10*3/mm3    Neutrophil Rel % 51.5 45.0 - 70.0 %    Lymphocyte Rel % 28.1 20.0 - 45.0 %    Monocyte Rel % 13.5 (H) 3.0 - 8.0 %    Eosinophil Rel % 5.7 (H) 0.0 - 4.0 %    Basophil Rel % 0.9 0.0 - 2.0 %    Neutrophils Absolute 1.72 1.50 - 8.30 10*3/mm3    Lymphocytes Absolute 0.94 0.60 - 4.80 10*3/mm3    Monocytes Absolute 0.45 0.00 - 1.00 10*3/mm3    Eosinophils Absolute 0.19 0.10 - 0.30 10*3/mm3    Basophils Absolute 0.03 0.00 - 0.20 10*3/mm3    Immature Granulocyte Rel % 0.3 0.0 - 0.5 %    Immature Grans Absolute 0.01 0.00 - 0.03 10*3/mm3    nRBC 0.0 0.0 - 0.0 /100 WBC       Assessment/Plan   Yelena was seen today for hyperlipidemia.    Diagnoses and all orders for this visit:    Hyperlipidemia, unspecified hyperlipidemia type  -     Comprehensive Metabolic Panel; Future  -     Lipid Panel With / Chol / HDL Ratio; Future    Hypothyroidism, unspecified type  -     TSH; Future  -     T4, Free; Future    Depression, unspecified depression type    Osteopenia of spine    Macrocytosis without anemia  -     CBC & Differential; Future    Ulcerative rectosigmoiditis with rectal bleeding (CMS/HCC)    Liseth-Parkinson-White (WPW) pattern    Routine health maintenance    Primary osteoarthritis of knees, bilateral    1. Hyperlipidemia.  Off statin.  Continue lifestyle measures.    2. Hypothyroidism.  Decreased dose last time.  She is now adequately replaced.    3. Depression.  Controlled.  Continue venlafaxine and lifestyle measures.  Will discuss weaning off  venlafaxine in the spring.    4. Osteopenia.  Calcium, vitamin D, and weight bearing exercise.  Next dexa scan due 11/2018, but we will defer to next spring d/t upcoming surgeries.    5. Macrocytosis. B12 and folate normal.  Stable. This started after being put on the Azasan.    6. Ulcerative colitis.  Well-controlled.  Continue per Dr. Saldaña.    7. Liseth-Parkinson-White.  She will see cardiology next week prior to her knee replacements.    8. Routine health maint.  Prevnar UTD.  Mammogram UTD.  Pneumovax and flu vaccine today.    9. Primary OA.  Knee replacements scheduled for October (left)  and November (right)  by Dr. Barber.      Outpatient Medications Prior to Visit   Medication Sig Dispense Refill   • AZASAN 75 MG tablet 75 mg daily.     • Calcium Carb-Cholecalciferol 1000-800 MG-UNIT tablet Take  by mouth.     • cetirizine (ZyrTEC) 10 MG tablet Take 10 mg by mouth daily.     • InFLIXimab (REMICADE IV) Infuse 1 dose into a venous catheter Every 30 (Thirty) Days. Every 60 days.  Dr. Saldaña.     • levothyroxine (SYNTHROID, LEVOTHROID) 100 MCG tablet TAKE ONE TABLET BY MOUTH DAILY 90 tablet 0   • mesalamine (LIALDA) 1.2 G EC tablet Take  by mouth.     • multivitamin (DAILY RAFFY) tablet tablet Take 1 tablet by mouth daily.     • omeprazole (priLOSEC) 20 MG capsule TAKE ONE CAPSULE BY MOUTH DAILY 90 capsule 1   • venlafaxine XR (EFFEXOR-XR) 75 MG 24 hr capsule TAKE ONE CAPSULE BY MOUTH DAILY 90 capsule 1   • Nutritional Supplements (VITAMIN D MAINTENANCE PO) Take  by mouth.     • diclofenac (VOLTAREN) 1 % gel gel Apply 4 g topically as needed.       No facility-administered medications prior to visit.      No orders of the defined types were placed in this encounter.    [unfilled]  Medications Discontinued During This Encounter   Medication Reason   • diclofenac (VOLTAREN) 1 % gel gel *Therapy completed       Return in about 6 months (around 3/26/2019).

## 2018-09-27 ENCOUNTER — OFFICE VISIT (OUTPATIENT)
Dept: GASTROENTEROLOGY | Facility: CLINIC | Age: 68
End: 2018-09-27

## 2018-09-27 VITALS
WEIGHT: 163.4 LBS | BODY MASS INDEX: 30.07 KG/M2 | SYSTOLIC BLOOD PRESSURE: 134 MMHG | DIASTOLIC BLOOD PRESSURE: 78 MMHG | HEIGHT: 62 IN

## 2018-09-27 DIAGNOSIS — K51.311 ULCERATIVE RECTOSIGMOIDITIS WITH RECTAL BLEEDING (HCC): Primary | ICD-10-CM

## 2018-09-27 DIAGNOSIS — D72.819 LEUKOPENIA, UNSPECIFIED TYPE: ICD-10-CM

## 2018-09-27 DIAGNOSIS — M17.0 PRIMARY OSTEOARTHRITIS OF KNEES, BILATERAL: ICD-10-CM

## 2018-09-27 PROCEDURE — 99213 OFFICE O/P EST LOW 20 MIN: CPT | Performed by: INTERNAL MEDICINE

## 2018-09-27 NOTE — PROGRESS NOTES
PATIENT INFORMATION  Yelena Rangel       - 1950    CHIEF COMPLAINT  Chief Complaint   Patient presents with   • Ulcerative Colitis     4 mo follow up       HISTORY OF PRESENT ILLNESS  Overall doing well wrt UC 1-2 a day and no blood or mucous and no cramps. Very happy with her Remicade. Also I only on 3 Lialda a day with out noticing any difference.     Good discussion wrt her planned two knee surgeries and her meds will adjust the timing of her Remicade to be a month after or at least 2 weeks before her surgeries and will wean her Azasan agressively (QOD for 4 weeks then off) if tolrerated        Ulcerative Colitis             REVIEW OF SYSTEMS  Review of Systems   All other systems reviewed and are negative.        ACTIVE PROBLEMS  Patient Active Problem List    Diagnosis   • Leukopenia [D72.819]   • Macrocytosis without anemia [D75.89]   • Depression [F32.9]   • Hyperlipidemia [E78.5]   • Hypothyroidism [E03.9]     Overview Note:     S/p right thyroidectomy for mass around .  Dr. Collins.     • Osteopenia [M85.80]     Overview Note:     Dexa scan 2016.     • Liseth-Parkinson-White (WPW) pattern [I45.6]     Overview Note:     No longer seeing cardiologist.     • Ulcerative colitis (CMS/HCC) [K51.90]     Overview Note:     Dr. Saldaña.     • Allergic rhinitis [J30.9]   • Primary osteoarthritis of knees, bilateral [M17.0]     Overview Note:     Dr. Barber, [facility].           PAST MEDICAL HISTORY  Past Medical History:   Diagnosis Date   • Arthritis    • Colon polyp    • Disease of thyroid gland    • GERD (gastroesophageal reflux disease)    • Hyperlipidemia    • Hypothyroidism    • Ulcerative (chronic) rectosigmoiditis with rectal bleeding (CMS/HCC)    • Ulcerative colitis (CMS/HCC)    • WPW (Liseth-Parkinson-White syndrome)          SURGICAL HISTORY  Past Surgical History:   Procedure Laterality Date   • COLONOSCOPY N/A 2016    Procedure: COLONOSCOPY with biopsies;  Surgeon: Dl  Ivana Saldaña MD;  Location: [facility] OR;  Service:    • COLONOSCOPY W/ POLYPECTOMY     • HYSTERECTOMY     • SIGMOIDOSCOPY N/A 12/13/2017    Procedure: SIGMOIDOSCOPY FLEXIBLE with biopsy;  Surgeon: Dl Saldaña MD;  Location: [facility] OR;  Service:    • THYROID LOBECTOMY Right          FAMILY HISTORY  Family History   Problem Relation Age of Onset   • Breast cancer Mother    • Breast cancer Sister    • Colon cancer Father    • Colon cancer Brother          SOCIAL HISTORY  Social History     Occupational History   • Not on file.     Social History Main Topics   • Smoking status: Never Smoker   • Smokeless tobacco: Never Used   • Alcohol use Yes      Comment: twice week   • Drug use: No   • Sexual activity: Defer         CURRENT MEDICATIONS    Current Outpatient Prescriptions:   •  acetaminophen (TYLENOL 8 HOUR ARTHRITIS PAIN) 650 MG 8 hr tablet, Take 650 mg by mouth Every 8 (Eight) Hours As Needed for Mild Pain ., Disp: , Rfl:   •  AZASAN 75 MG tablet, 75 mg daily., Disp: , Rfl:   •  Calcium Carb-Cholecalciferol 1000-800 MG-UNIT tablet, Take  by mouth., Disp: , Rfl:   •  cetirizine (ZyrTEC) 10 MG tablet, Take 10 mg by mouth daily., Disp: , Rfl:   •  InFLIXimab (REMICADE IV), Infuse 1 dose into a venous catheter Every 30 (Thirty) Days. Every 60 days.  Dr. Saldaña., Disp: , Rfl:   •  levothyroxine (SYNTHROID, LEVOTHROID) 100 MCG tablet, TAKE ONE TABLET BY MOUTH DAILY, Disp: 90 tablet, Rfl: 0  •  mesalamine (LIALDA) 1.2 G EC tablet, Take  by mouth., Disp: , Rfl:   •  multivitamin (DAILY RAFFY) tablet tablet, Take 1 tablet by mouth daily., Disp: , Rfl:   •  Nutritional Supplements (VITAMIN D MAINTENANCE PO), Take  by mouth., Disp: , Rfl:   •  omeprazole (priLOSEC) 20 MG capsule, TAKE ONE CAPSULE BY MOUTH DAILY, Disp: 90 capsule, Rfl: 1  •  venlafaxine XR (EFFEXOR-XR) 75 MG 24 hr capsule, TAKE ONE CAPSULE BY MOUTH DAILY, Disp: 90 capsule, Rfl: 1    ALLERGIES  Sulfa antibiotics    VITALS  Vitals:     "09/27/18 1002   BP: 134/78   Weight: 74.1 kg (163 lb 6.4 oz)   Height: 157.5 cm (62.01\")       LAST RESULTS   Lab on 09/19/2018   Component Date Value Ref Range Status   • Glucose 09/19/2018 94  65 - 99 mg/dL Final   • BUN 09/19/2018 9  8 - 23 mg/dL Final   • Creatinine 09/19/2018 0.57  0.57 - 1.00 mg/dL Final   • eGFR Non  Am 09/19/2018 106  >60 mL/min/1.73 Final   • eGFR African Am 09/19/2018 129  >60 mL/min/1.73 Final   • BUN/Creatinine Ratio 09/19/2018 15.8  7.0 - 25.0 Final   • Sodium 09/19/2018 136  136 - 145 mmol/L Final   • Potassium 09/19/2018 4.4  3.5 - 5.2 mmol/L Final   • Chloride 09/19/2018 98  98 - 107 mmol/L Final   • Total CO2 09/19/2018 27.5  22.0 - 29.0 mmol/L Final   • Calcium 09/19/2018 9.5  8.8 - 10.5 mg/dL Final   • Total Protein 09/19/2018 8.3  6.0 - 8.5 g/dL Final   • Albumin 09/19/2018 4.50  3.50 - 5.20 g/dL Final   • Globulin 09/19/2018 3.8  gm/dL Final   • A/G Ratio 09/19/2018 1.2  g/dL Final   • Total Bilirubin 09/19/2018 0.5  0.2 - 1.2 mg/dL Final   • Alkaline Phosphatase 09/19/2018 54  40 - 129 U/L Final   • AST (SGOT) 09/19/2018 19  5 - 32 U/L Final   • ALT (SGPT) 09/19/2018 11  5 - 33 U/L Final   • WBC 09/19/2018 3.34* 4.80 - 10.80 10*3/mm3 Final   • RBC 09/19/2018 3.84* 4.20 - 5.40 10*6/mm3 Final   • Hemoglobin 09/19/2018 13.1  12.0 - 16.0 g/dL Final   • Hematocrit 09/19/2018 40.0  37.0 - 47.0 % Final   • MCV 09/19/2018 104.2* 81.0 - 99.0 fL Final   • MCH 09/19/2018 34.1* 27.0 - 31.0 pg Final   • MCHC 09/19/2018 32.8  31.0 - 37.0 g/dL Final   • RDW 09/19/2018 13.1  11.5 - 14.5 % Final   • Platelets 09/19/2018 321  140 - 500 10*3/mm3 Final   • Neutrophil Rel % 09/19/2018 51.5  45.0 - 70.0 % Final   • Lymphocyte Rel % 09/19/2018 28.1  20.0 - 45.0 % Final   • Monocyte Rel % 09/19/2018 13.5* 3.0 - 8.0 % Final   • Eosinophil Rel % 09/19/2018 5.7* 0.0 - 4.0 % Final   • Basophil Rel % 09/19/2018 0.9  0.0 - 2.0 % Final   • Neutrophils Absolute 09/19/2018 1.72  1.50 - 8.30 10*3/mm3 " Final   • Lymphocytes Absolute 09/19/2018 0.94  0.60 - 4.80 10*3/mm3 Final   • Monocytes Absolute 09/19/2018 0.45  0.00 - 1.00 10*3/mm3 Final   • Eosinophils Absolute 09/19/2018 0.19  0.10 - 0.30 10*3/mm3 Final   • Basophils Absolute 09/19/2018 0.03  0.00 - 0.20 10*3/mm3 Final   • Immature Granulocyte Rel % 09/19/2018 0.3  0.0 - 0.5 % Final   • Immature Grans Absolute 09/19/2018 0.01  0.00 - 0.03 10*3/mm3 Final   • nRBC 09/19/2018 0.0  0.0 - 0.0 /100 WBC Final   • TSH 09/19/2018 0.412  0.270 - 4.200 mIU/mL Final   • Vitamin B-12 09/19/2018 639  232-1,245 pg/mL Final   • Total Cholesterol 09/19/2018 275* 0 - 200 mg/dL Final   • Triglycerides 09/19/2018 239* 0 - 150 mg/dL Final   • HDL Cholesterol 09/19/2018 68* 40 - 60 mg/dL Final   • VLDL Cholesterol 09/19/2018 47.8* 7 - 27 mg/dL Final   • LDL Cholesterol  09/19/2018 159* 0 - 100 mg/dL Final   • Chol/HDL Ratio 09/19/2018 4.04   Final   • Free T4 09/19/2018 1.49  0.93 - 1.70 ng/dL Final   • Hep C Virus Ab 09/19/2018 <0.1  0.0 - 0.9 s/co ratio Final    Comment:                                   Negative:     < 0.8                               Indeterminate: 0.8 - 0.9                                    Positive:     > 0.9   The CDC recommends that a positive HCV antibody result   be followed up with a HCV Nucleic Acid Amplification   test ([id number]).     • Folate, Hemolysate 09/19/2018 429.0  Not Estab. ng/mL Final   • RBC Folate 09/19/2018 1073  >498 ng/mL Final     Mammo Screening Digital Tomosynthesis Bilateral With Cad    Result Date: 9/19/2018  Narrative: EXAM: 1. Bilateral digital screening mammogram with CAD. 2. Bilateral digital screening breast tomosynthesis.  INDICATION: Routine screening. Positive family history of breast cancer.  TECHNIQUE: Bilateral digital screening mammogram images were obtained and reviewed with CAD. Digital breast tomosynthesis images were also reviewed.  COMPARISON: 9/7/2017, 8/31/2016  FINDINGS: Breast parenchyma shows scattered  fibroglandular densities. No new masses or suspicious microcalcifications.      Impression: Negative mammogram. Routine screening in 1 year is recommended.  BI-RADS CATEGORY 1: Negative.  Women over the age of 40 undergoing screening mammography are entered into a reminder system with target due date for the next mammogram.  This report was finalized on 9/19/2018 10:42 AM by Dr. Naveen Lechuga MD.        PHYSICAL EXAM  Physical Exam   Constitutional: She is oriented to person, place, and time. She appears well-developed and well-nourished.   HENT:   Head: Normocephalic and atraumatic.   Eyes: Pupils are equal, round, and reactive to light. Conjunctivae and EOM are normal. No scleral icterus.   Neck: Normal range of motion. Neck supple. No thyromegaly present.   Cardiovascular: Normal rate, regular rhythm, normal heart sounds and intact distal pulses.  Exam reveals no gallop.    No murmur heard.  Pulmonary/Chest: Effort normal and breath sounds normal. She has no wheezes. She has no rales.   Abdominal: Soft. Bowel sounds are normal. She exhibits no shifting dullness, no distension, no fluid wave, no abdominal bruit, no ascites and no mass. There is no hepatosplenomegaly. There is no tenderness. There is no guarding and negative Sorto's sign. Hernia confirmed negative in the ventral area.   Musculoskeletal: Normal range of motion. She exhibits no edema.   Lymphadenopathy:     She has no cervical adenopathy.   Neurological: She is alert and oriented to person, place, and time.   Skin: Skin is warm and dry. No rash noted. She is not diaphoretic. No erythema.   Psychiatric: She has a normal mood and affect. Her behavior is normal.       ASSESSMENT  Diagnoses and all orders for this visit:    Ulcerative rectosigmoiditis with rectal bleeding (CMS/HCC)    Primary osteoarthritis of knees, bilateral    Leukopenia, unspecified type          PLAN  Return in about 2 months (around 11/27/2018).     Will wean her Azasan in the  face of two surgeries coming up. Also will push her Remicade back a week to allow 4 weeks of healing after her first Knee replacement.

## 2018-09-28 ENCOUNTER — TELEPHONE (OUTPATIENT)
Dept: GASTROENTEROLOGY | Facility: CLINIC | Age: 68
End: 2018-09-28

## 2018-09-28 NOTE — TELEPHONE ENCOUNTER
I dont have a say in that one that is your surgeon's call, But none of them should affect her...anymore than another but stay on her Probiotic to avoid C.Diff

## 2018-10-31 ENCOUNTER — HOSPITAL ENCOUNTER (OUTPATIENT)
Dept: INFUSION THERAPY | Facility: HOSPITAL | Age: 68
Discharge: HOME | End: 2018-10-31
Attending: INTERNAL MEDICINE

## 2018-11-07 ENCOUNTER — HOSPITAL ENCOUNTER (OUTPATIENT)
Dept: INFUSION THERAPY | Facility: HOSPITAL | Age: 68
Discharge: HOME | End: 2018-11-07
Attending: INTERNAL MEDICINE | Admitting: INTERNAL MEDICINE

## 2018-11-07 VITALS
TEMPERATURE: 98.1 F | OXYGEN SATURATION: 97 % | DIASTOLIC BLOOD PRESSURE: 75 MMHG | HEIGHT: 62 IN | HEART RATE: 65 BPM | BODY MASS INDEX: 29.44 KG/M2 | RESPIRATION RATE: 16 BRPM | SYSTOLIC BLOOD PRESSURE: 130 MMHG | WEIGHT: 160 LBS

## 2018-11-07 DIAGNOSIS — K51.311 ULCERATIVE RECTOSIGMOIDITIS WITH RECTAL BLEEDING (HCC): Primary | ICD-10-CM

## 2018-11-07 PROCEDURE — 96365 THER/PROPH/DIAG IV INF INIT: CPT

## 2018-11-07 PROCEDURE — 96415 CHEMO IV INFUSION ADDL HR: CPT

## 2018-11-07 PROCEDURE — 25010000002 INFLIXIMAB PER 10 MG: Performed by: INTERNAL MEDICINE

## 2018-11-07 PROCEDURE — 96413 CHEMO IV INFUSION 1 HR: CPT

## 2018-11-07 PROCEDURE — 96366 THER/PROPH/DIAG IV INF ADDON: CPT

## 2018-11-07 RX ORDER — DIPHENHYDRAMINE HYDROCHLORIDE 50 MG/ML
50 INJECTION INTRAMUSCULAR; INTRAVENOUS ONCE
Status: CANCELLED | OUTPATIENT
Start: 2018-11-07

## 2018-11-07 RX ORDER — ACETAMINOPHEN 325 MG/1
650 TABLET ORAL ONCE
Status: CANCELLED | OUTPATIENT
Start: 2018-11-07

## 2018-11-07 RX ADMIN — INFLIXIMAB 730 MG: 100 INJECTION, POWDER, LYOPHILIZED, FOR SOLUTION INTRAVENOUS at 09:26

## 2018-11-07 NOTE — NURSING NOTE
NURSING PROGRESS NOTE      0840 Pt to [facility] per scheduled appointment with spouse per ambulatory .Pt ID verified by (2) identifiers. Allergies, medications and medical history reviewed and verified. Tylenol 1000 mg po taken on arrival to unit. Pt takes her own pre-meds for infusion.Caroline Cerda RN

## 2018-11-07 NOTE — PATIENT INSTRUCTIONS
"  Call Dr. Dl Saldaña, Gastroenterology at [phone] if you have any problems or concerns.    We know you have a Choice in healthcare and appreciate you using [facility].  Our purpose is to provide you \"Excellent Care\".  We hope that you will always choose us in the future and continue to recommend us to your family and friends.              Infliximab injection  What is this medicine?  INFLIXIMAB (in FLIX i mab) is used to treat Crohn's disease and ulcerative colitis. It is also used to treat ankylosing spondylitis, plaque psoriasis, and some forms of arthritis.  This medicine may be used for other purposes; ask your health care provider or pharmacist if you have questions.  COMMON BRAND NAME(S): INFLECTRA, Remicade, RENFLEXIS  What should I tell my health care provider before I take this medicine?  They need to know if you have any of these conditions:  -cancer  -current or past resident of [place]  -diabetes  -exposure to tuberculosis  -Guillain-[place] syndrome  -heart failure  -hepatitis or liver disease  -immune system problems  -infection  -lung or breathing disease, like COPD  -multiple sclerosis  -receiving phototherapy for the skin  -seizure disorder  -an unusual or allergic reaction to infliximab, mouse proteins, other medicines, foods, dyes, or preservatives  -pregnant or trying to get pregnant  -breast-feeding  How should I use this medicine?  This medicine is for injection into a vein. It is usually given by a health care professional in a hospital or clinic setting.  A special MedGuide will be given to you by the pharmacist with each prescription and refill. Be sure to read this information carefully each time.  Talk to your pediatrician regarding the use of this medicine in children. While this drug may be prescribed for children as young as 6 years of age for selected conditions, precautions do apply.  Overdosage: If you think you have taken too much " of this medicine contact a poison control center or emergency room at once.  NOTE: This medicine is only for you. Do not share this medicine with others.  What if I miss a dose?  It is important not to miss your dose. Call your doctor or health care professional if you are unable to keep an appointment.  What may interact with this medicine?  Do not take this medicine with any of the following medications:  -biologic medicines such as abatacept, adalimumab, anakinra, certolizumab, etanercept, golimumab, rituximab, secukinumab, tocilizumab, tofactinib, ustekinumab  -live vaccines  This list may not describe all possible interactions. Give your health care provider a list of all the medicines, herbs, non-prescription drugs, or dietary supplements you use. Also tell them if you smoke, drink alcohol, or use illegal drugs. Some items may interact with your medicine.  What should I watch for while using this medicine?  Your condition will be monitored carefully while you are receiving this medicine. Visit your doctor or health care professional for regular checks on your progress. You may need blood work done while you are taking this medicine. Before beginning therapy, your doctor may do a test to see if you have been exposed to tuberculosis.  Call your doctor or health care professional for advice if you get a fever, chills or sore throat, or other symptoms of a cold or flu. Do not treat yourself. This drug decreases your body's ability to fight infections. Try to avoid being around people who are sick.  This medicine may make the symptoms of heart failure worse in some patients. If you notice symptoms such as increased shortness of breath or swelling of the ankles or legs, contact your health care provider right away.  If you are going to have surgery or dental work, tell your health care professional or dentist that you have received this medicine.  If you take this medicine for plaque psoriasis, stay out of the sun.  If you cannot avoid being in the sun, wear protective clothing and use sunscreen. Do not use sun lamps or tanning beds/booths.  Talk to your doctor about your risk of cancer. You may be more at risk for certain types of cancers if you take this medicine.  What side effects may I notice from receiving this medicine?  Side effects that you should report to your doctor or health care professional as soon as possible:  -allergic reactions like skin rash, itching or hives, swelling of the face, lips, or tongue  -breathing problems  -changes in vision  -chest pain  -fever or chills, usually related to the infusion  -joint pain  -pain, tingling, numbness in the hands or feet  -redness, blistering, peeling or loosening of the skin, including inside the mouth  -seizures  -signs of infection - fever or chills, cough, sore throat, flu-like symptoms, pain or difficulty passing urine  -signs and symptoms of liver injury like dark yellow or brown urine; general ill feeling; light-colored stools; loss of appetite; nausea; right upper belly pain; unusually weak or tired; yellowing of the eyes or skin  -signs and symptoms of a stroke like changes in vision; confusion; trouble speaking or understanding; severe headaches; sudden numbness or weakness of the face, arm or leg; trouble walking; dizziness; loss of balance or coordination  -swelling of the ankles, feet, or hands  -swollen lymph nodes in the neck, underarm, or groin areas  -unusual bleeding or bruising  -unusually weak or tired  Side effects that usually do not require medical attention (report to your doctor or health care professional if they continue or are bothersome):  -headache  -nausea  -stomach pain  -upset stomach  This list may not describe all possible side effects. Call your doctor for medical advice about side effects. You may report side effects to FDA at 5-735-FDA-3976.  Where should I keep my medicine?  This drug is given in a hospital or clinic and will not  be stored at home.  NOTE: This sheet is a summary. It may not cover all possible information. If you have questions about this medicine, talk to your doctor, pharmacist, or health care provider.  © 2018 [employer]vier/Gold Standard (2018-01-17 13:45:32)

## 2018-11-07 NOTE — NURSING NOTE
NURSING PROGRESS NOTE       Tolerated prescribed treatment without incident. Discharged to home per  ambulatory. AVS reviewed with patient. Condition Satisfactory. Caroline Cerda RN

## 2018-12-02 DIAGNOSIS — E03.9 HYPOTHYROIDISM, UNSPECIFIED TYPE: ICD-10-CM

## 2018-12-03 RX ORDER — VENLAFAXINE HYDROCHLORIDE 75 MG/1
CAPSULE, EXTENDED RELEASE ORAL
Qty: 90 CAPSULE | Refills: 0 | Status: SHIPPED | OUTPATIENT
Start: 2018-12-03 | End: 2019-03-02 | Stop reason: SDUPTHER

## 2018-12-03 RX ORDER — LEVOTHYROXINE SODIUM 0.1 MG/1
TABLET ORAL
Qty: 90 TABLET | Refills: 0 | Status: SHIPPED | OUTPATIENT
Start: 2018-12-03 | End: 2019-03-02 | Stop reason: SDUPTHER

## 2018-12-06 ENCOUNTER — OFFICE VISIT (OUTPATIENT)
Dept: GASTROENTEROLOGY | Facility: CLINIC | Age: 68
End: 2018-12-06

## 2018-12-06 VITALS — WEIGHT: 168.4 LBS | BODY MASS INDEX: 30.99 KG/M2 | HEIGHT: 62 IN

## 2018-12-06 DIAGNOSIS — K51.311 ULCERATIVE RECTOSIGMOIDITIS WITH RECTAL BLEEDING (HCC): Primary | ICD-10-CM

## 2018-12-06 DIAGNOSIS — M85.88 OSTEOPENIA OF SPINE: ICD-10-CM

## 2018-12-06 DIAGNOSIS — K21.00 GASTROESOPHAGEAL REFLUX DISEASE WITH ESOPHAGITIS: ICD-10-CM

## 2018-12-06 PROCEDURE — 99214 OFFICE O/P EST MOD 30 MIN: CPT | Performed by: INTERNAL MEDICINE

## 2018-12-06 RX ORDER — MESALAMINE 0.38 G/1
1500 CAPSULE, EXTENDED RELEASE ORAL DAILY
Qty: 360 CAPSULE | Refills: 3 | Status: SHIPPED | OUTPATIENT
Start: 2018-12-06 | End: 2020-04-01

## 2018-12-06 NOTE — PROGRESS NOTES
PATIENT INFORMATION  Yelena Rangel       - 1950    CHIEF COMPLAINT  Chief Complaint   Patient presents with   • Follow-up     2 mo follow up on ulcerative rectosigmoiditis       HISTORY OF PRESENT ILLNESS  Has had both total knees done and is up and about and ready to resume her Remicade. Her last was aerly November and will have her next on . Doing well with respect to her Diarrhea  Responded to Probiotic and is off her Azathioprine.    Is on Lovenox for 6 more days and has mild macrocytosis and off vitamen so should resume it and her Lialda is being dropped for Apriso on her formulary so will chenge her script.              REVIEW OF SYSTEMS  Review of Systems   All other systems reviewed and are negative.        ACTIVE PROBLEMS  Patient Active Problem List    Diagnosis   • Leukopenia [D72.819]   • Macrocytosis without anemia [D75.89]   • Depression [F32.9]   • Hyperlipidemia [E78.5]   • Hypothyroidism [E03.9]   • Osteopenia [M85.80]   • Liseth-Parkinson-White (WPW) pattern [I45.6]   • Ulcerative colitis (CMS/HCC) [K51.90]   • Allergic rhinitis [J30.9]   • Primary osteoarthritis of knees, bilateral [M17.0]         PAST MEDICAL HISTORY  Past Medical History:   Diagnosis Date   • Arthritis    • Colon polyp    • Disease of thyroid gland    • GERD (gastroesophageal reflux disease)    • Hyperlipidemia    • Hypothyroidism    • Osteopenia    • Ulcerative (chronic) rectosigmoiditis with rectal bleeding (CMS/HCC)    • Ulcerative colitis (CMS/HCC)    • WPW (Liseth-Parkinson-White syndrome)          SURGICAL HISTORY  Past Surgical History:   Procedure Laterality Date   • COLONOSCOPY W/ POLYPECTOMY     • HYSTERECTOMY     • REPLACEMENT TOTAL KNEE Left 10/09/2018    [facility], Dr. Simon Barber, surgeon   • THYROID LOBECTOMY Right          FAMILY HISTORY  Family History   Problem Relation Age of Onset   • Breast cancer Mother    • Breast cancer Sister    • Colon cancer Father    • Colon cancer Brother   "        SOCIAL HISTORY  Social History     Occupational History   • Not on file   Tobacco Use   • Smoking status: Never Smoker   • Smokeless tobacco: Never Used   Substance and Sexual Activity   • Alcohol use: Yes     Comment: twice week   • Drug use: No   • Sexual activity: Defer       Debilities/Disabilities Identified: None    Emotional Behavior: Appropriate    CURRENT MEDICATIONS    Current Outpatient Medications:   •  acetaminophen (TYLENOL 8 HOUR ARTHRITIS PAIN) 650 MG 8 hr tablet, Take 650 mg by mouth Every 8 (Eight) Hours As Needed for Mild Pain ., Disp: , Rfl:   •  AZASAN 75 MG tablet, 75 mg daily., Disp: , Rfl:   •  Calcium Carb-Cholecalciferol 1000-800 MG-UNIT tablet, Take  by mouth., Disp: , Rfl:   •  cetirizine (ZyrTEC) 10 MG tablet, Take 10 mg by mouth daily., Disp: , Rfl:   •  enoxaparin (LOVENOX) 40 MG/0.4ML solution syringe, , Disp: , Rfl:   •  InFLIXimab (REMICADE IV), Infuse 1 dose into a venous catheter Every 30 (Thirty) Days. Every 60 days.  Dr. Saldaña., Disp: , Rfl:   •  levothyroxine (SYNTHROID, LEVOTHROID) 100 MCG tablet, TAKE ONE TABLET BY MOUTH DAILY, Disp: 90 tablet, Rfl: 0  •  mesalamine (APRISO) 0.375 g 24 hr capsule, Take 4 capsules by mouth Daily., Disp: 360 capsule, Rfl: 3  •  mesalamine (LIALDA) 1.2 G EC tablet, Take  by mouth., Disp: , Rfl:   •  multivitamin (DAILY RAFFY) tablet tablet, Take 1 tablet by mouth daily., Disp: , Rfl:   •  Nutritional Supplements (VITAMIN D MAINTENANCE PO), Take  by mouth., Disp: , Rfl:   •  omeprazole (priLOSEC) 20 MG capsule, TAKE ONE CAPSULE BY MOUTH DAILY, Disp: 90 capsule, Rfl: 1  •  venlafaxine XR (EFFEXOR-XR) 75 MG 24 hr capsule, TAKE ONE CAPSULE BY MOUTH DAILY, Disp: 90 capsule, Rfl: 0    ALLERGIES  Sulfa antibiotics    VITALS  Vitals:    12/06/18 1055   Weight: 76.4 kg (168 lb 6.4 oz)   Height: 157.5 cm (62.01\")       LAST RESULTS   Lab on 09/19/2018   Component Date Value Ref Range Status   • Glucose 09/19/2018 94  65 - 99 mg/dL Final   • " BUN 09/19/2018 9  8 - 23 mg/dL Final   • Creatinine 09/19/2018 0.57  0.57 - 1.00 mg/dL Final   • eGFR Non  Am 09/19/2018 106  >60 mL/min/1.73 Final   • eGFR African Am 09/19/2018 129  >60 mL/min/1.73 Final   • BUN/Creatinine Ratio 09/19/2018 15.8  7.0 - 25.0 Final   • Sodium 09/19/2018 136  136 - 145 mmol/L Final   • Potassium 09/19/2018 4.4  3.5 - 5.2 mmol/L Final   • Chloride 09/19/2018 98  98 - 107 mmol/L Final   • Total CO2 09/19/2018 27.5  22.0 - 29.0 mmol/L Final   • Calcium 09/19/2018 9.5  8.8 - 10.5 mg/dL Final   • Total Protein 09/19/2018 8.3  6.0 - 8.5 g/dL Final   • Albumin 09/19/2018 4.50  3.50 - 5.20 g/dL Final   • Globulin 09/19/2018 3.8  gm/dL Final   • A/G Ratio 09/19/2018 1.2  g/dL Final   • Total Bilirubin 09/19/2018 0.5  0.2 - 1.2 mg/dL Final   • Alkaline Phosphatase 09/19/2018 54  40 - 129 U/L Final   • AST (SGOT) 09/19/2018 19  5 - 32 U/L Final   • ALT (SGPT) 09/19/2018 11  5 - 33 U/L Final   • WBC 09/19/2018 3.34* 4.80 - 10.80 10*3/mm3 Final   • RBC 09/19/2018 3.84* 4.20 - 5.40 10*6/mm3 Final   • Hemoglobin 09/19/2018 13.1  12.0 - 16.0 g/dL Final   • Hematocrit 09/19/2018 40.0  37.0 - 47.0 % Final   • MCV 09/19/2018 104.2* 81.0 - 99.0 fL Final   • MCH 09/19/2018 34.1* 27.0 - 31.0 pg Final   • MCHC 09/19/2018 32.8  31.0 - 37.0 g/dL Final   • RDW 09/19/2018 13.1  11.5 - 14.5 % Final   • Platelets 09/19/2018 321  140 - 500 10*3/mm3 Final   • Neutrophil Rel % 09/19/2018 51.5  45.0 - 70.0 % Final   • Lymphocyte Rel % 09/19/2018 28.1  20.0 - 45.0 % Final   • Monocyte Rel % 09/19/2018 13.5* 3.0 - 8.0 % Final   • Eosinophil Rel % 09/19/2018 5.7* 0.0 - 4.0 % Final   • Basophil Rel % 09/19/2018 0.9  0.0 - 2.0 % Final   • Neutrophils Absolute 09/19/2018 1.72  1.50 - 8.30 10*3/mm3 Final   • Lymphocytes Absolute 09/19/2018 0.94  0.60 - 4.80 10*3/mm3 Final   • Monocytes Absolute 09/19/2018 0.45  0.00 - 1.00 10*3/mm3 Final   • Eosinophils Absolute 09/19/2018 0.19  0.10 - 0.30 10*3/mm3 Final   •  Basophils Absolute 09/19/2018 0.03  0.00 - 0.20 10*3/mm3 Final   • Immature Granulocyte Rel % 09/19/2018 0.3  0.0 - 0.5 % Final   • Immature Grans Absolute 09/19/2018 0.01  0.00 - 0.03 10*3/mm3 Final   • nRBC 09/19/2018 0.0  0.0 - 0.0 /100 WBC Final   • TSH 09/19/2018 0.412  0.270 - 4.200 mIU/mL Final   • Vitamin B-12 09/19/2018 639  232-1,245 pg/mL Final   • Total Cholesterol 09/19/2018 275* 0 - 200 mg/dL Final   • Triglycerides 09/19/2018 239* 0 - 150 mg/dL Final   • HDL Cholesterol 09/19/2018 68* 40 - 60 mg/dL Final   • VLDL Cholesterol 09/19/2018 47.8* 7 - 27 mg/dL Final   • LDL Cholesterol  09/19/2018 159* 0 - 100 mg/dL Final   • Chol/HDL Ratio 09/19/2018 4.04   Final   • Free T4 09/19/2018 1.49  0.93 - 1.70 ng/dL Final   • Hep C Virus Ab 09/19/2018 <0.1  0.0 - 0.9 s/co ratio Final    Comment:                                   Negative:     < 0.8                               Indeterminate: 0.8 - 0.9                                    Positive:     > 0.9   The CDC recommends that a positive HCV antibody result   be followed up with a HCV Nucleic Acid Amplification   test (669999).     • Folate, Hemolysate 09/19/2018 429.0  Not Estab. ng/mL Final   • RBC Folate 09/19/2018 1,073  >498 ng/mL Final     No results found.    PHYSICAL EXAM  Physical Exam   Constitutional: She is oriented to person, place, and time. She appears well-developed and well-nourished.   HENT:   Head: Normocephalic and atraumatic.   Eyes: Conjunctivae and EOM are normal. Pupils are equal, round, and reactive to light. No scleral icterus.   Neck: Normal range of motion. Neck supple. No thyromegaly present.   Cardiovascular: Normal rate, regular rhythm, normal heart sounds and intact distal pulses. Exam reveals no gallop.   No murmur heard.  Pulmonary/Chest: Effort normal and breath sounds normal. She has no wheezes. She has no rales.   Abdominal: Soft. Bowel sounds are normal. She exhibits no shifting dullness, no distension, no fluid wave, no  abdominal bruit, no ascites and no mass. There is no hepatosplenomegaly. There is no tenderness. There is no guarding and negative Sorto's sign. Hernia confirmed negative in the ventral area.   Musculoskeletal: Normal range of motion. She exhibits no edema.   Lymphadenopathy:     She has no cervical adenopathy.   Neurological: She is alert and oriented to person, place, and time.   Skin: Skin is warm and dry. No rash noted. She is not diaphoretic. No erythema.   Psychiatric: She has a normal mood and affect. Her behavior is normal.       ASSESSMENT  Diagnoses and all orders for this visit:    Ulcerative rectosigmoiditis with rectal bleeding (CMS/HCC)    Osteopenia of spine    Gastroesophageal reflux disease with esophagitis    Other orders  -     enoxaparin (LOVENOX) 40 MG/0.4ML solution syringe;   -     mesalamine (APRISO) 0.375 g 24 hr capsule; Take 4 capsules by mouth Daily.          PLAN  Return in about 4 months (around 4/6/2019).

## 2018-12-14 RX ORDER — OMEPRAZOLE 20 MG/1
CAPSULE, DELAYED RELEASE ORAL
Qty: 90 CAPSULE | Refills: 0 | Status: SHIPPED | OUTPATIENT
Start: 2018-12-14 | End: 2019-03-18 | Stop reason: SDUPTHER

## 2018-12-19 ENCOUNTER — RESULTS ENCOUNTER (OUTPATIENT)
Dept: INTERNAL MEDICINE | Facility: CLINIC | Age: 68
End: 2018-12-19

## 2018-12-19 DIAGNOSIS — E03.9 HYPOTHYROIDISM, UNSPECIFIED TYPE: ICD-10-CM

## 2018-12-19 DIAGNOSIS — D75.89 MACROCYTOSIS WITHOUT ANEMIA: ICD-10-CM

## 2018-12-19 DIAGNOSIS — M85.88 OSTEOPENIA OF SPINE: ICD-10-CM

## 2018-12-19 DIAGNOSIS — I45.6 WOLFF-PARKINSON-WHITE (WPW) PATTERN: ICD-10-CM

## 2018-12-19 DIAGNOSIS — E78.5 HYPERLIPIDEMIA, UNSPECIFIED HYPERLIPIDEMIA TYPE: ICD-10-CM

## 2018-12-19 DIAGNOSIS — F32.A DEPRESSION, UNSPECIFIED DEPRESSION TYPE: ICD-10-CM

## 2018-12-26 ENCOUNTER — HOSPITAL ENCOUNTER (OUTPATIENT)
Dept: INFUSION THERAPY | Facility: HOSPITAL | Age: 68
Discharge: HOME | End: 2018-12-26
Admitting: INTERNAL MEDICINE

## 2018-12-26 VITALS
TEMPERATURE: 97.6 F | RESPIRATION RATE: 16 BRPM | WEIGHT: 164 LBS | HEART RATE: 71 BPM | BODY MASS INDEX: 30.18 KG/M2 | SYSTOLIC BLOOD PRESSURE: 134 MMHG | OXYGEN SATURATION: 97 % | HEIGHT: 62 IN | DIASTOLIC BLOOD PRESSURE: 74 MMHG

## 2018-12-26 DIAGNOSIS — K51.311 ULCERATIVE RECTOSIGMOIDITIS WITH RECTAL BLEEDING (HCC): Primary | ICD-10-CM

## 2018-12-26 PROCEDURE — 25010000002 INFLIXIMAB PER 10 MG: Performed by: INTERNAL MEDICINE

## 2018-12-26 PROCEDURE — 96415 CHEMO IV INFUSION ADDL HR: CPT

## 2018-12-26 PROCEDURE — 96413 CHEMO IV INFUSION 1 HR: CPT

## 2018-12-26 RX ORDER — DIPHENHYDRAMINE HYDROCHLORIDE 50 MG/ML
50 INJECTION INTRAMUSCULAR; INTRAVENOUS ONCE
Status: CANCELLED | OUTPATIENT
Start: 2018-12-26

## 2018-12-26 RX ORDER — ACETAMINOPHEN 325 MG/1
650 TABLET ORAL ONCE
Status: CANCELLED | OUTPATIENT
Start: 2018-12-26

## 2018-12-26 RX ADMIN — INFLIXIMAB 744 MG: 100 INJECTION, POWDER, LYOPHILIZED, FOR SOLUTION INTRAVENOUS at 09:23

## 2018-12-26 NOTE — NURSING NOTE
Patient arrived for scheduled transfusion appointment. Intervention performed without incident. Education given, AVS reviewed, and Patient D/C at 1135. Elena Gallagher RN

## 2019-02-20 ENCOUNTER — HOSPITAL ENCOUNTER (OUTPATIENT)
Dept: INFUSION THERAPY | Facility: HOSPITAL | Age: 69
Discharge: HOME | End: 2019-02-20
Admitting: INTERNAL MEDICINE

## 2019-02-20 VITALS
DIASTOLIC BLOOD PRESSURE: 76 MMHG | RESPIRATION RATE: 16 BRPM | WEIGHT: 162 LBS | HEART RATE: 69 BPM | BODY MASS INDEX: 29.81 KG/M2 | SYSTOLIC BLOOD PRESSURE: 120 MMHG | TEMPERATURE: 97.5 F | HEIGHT: 62 IN | OXYGEN SATURATION: 96 %

## 2019-02-20 DIAGNOSIS — K51.311 ULCERATIVE RECTOSIGMOIDITIS WITH RECTAL BLEEDING (HCC): Primary | ICD-10-CM

## 2019-02-20 PROCEDURE — 25010000002 INFLIXIMAB PER 10 MG: Performed by: INTERNAL MEDICINE

## 2019-02-20 PROCEDURE — 96413 CHEMO IV INFUSION 1 HR: CPT

## 2019-02-20 PROCEDURE — 96415 CHEMO IV INFUSION ADDL HR: CPT

## 2019-02-20 RX ORDER — MULTIPLE VITAMINS W/ MINERALS TAB 9MG-400MCG
1 TAB ORAL DAILY
COMMUNITY

## 2019-02-20 RX ORDER — ACETAMINOPHEN 325 MG/1
650 TABLET ORAL ONCE
Status: CANCELLED | OUTPATIENT
Start: 2019-02-20

## 2019-02-20 RX ORDER — DIPHENHYDRAMINE HYDROCHLORIDE 50 MG/ML
50 INJECTION INTRAMUSCULAR; INTRAVENOUS ONCE
Status: CANCELLED | OUTPATIENT
Start: 2019-02-20

## 2019-02-20 RX ORDER — ACETAMINOPHEN 325 MG/1
650 TABLET ORAL ONCE
Status: DISCONTINUED | OUTPATIENT
Start: 2019-02-20 | End: 2019-02-22 | Stop reason: HOSPADM

## 2019-02-20 RX ORDER — DIPHENHYDRAMINE HYDROCHLORIDE 50 MG/ML
50 INJECTION INTRAMUSCULAR; INTRAVENOUS ONCE
Status: DISCONTINUED | OUTPATIENT
Start: 2019-02-20 | End: 2019-02-22 | Stop reason: HOSPADM

## 2019-02-20 RX ADMIN — INFLIXIMAB 735 MG: 100 INJECTION, POWDER, LYOPHILIZED, FOR SOLUTION INTRAVENOUS at 09:00

## 2019-02-20 NOTE — NURSING NOTE
NURSING PROGRESS NOTE    0830 Pt to [facility] per self .Pt ID verified by (2) identifiers. Allergies, medications and medical history reviewed and verified. Pre-meds zytec and tylenol taken at 0700 at home. Caroline Cerda RN  Tolerated prescribed treatment without incident. Patient reviewed  AVS, Pt verbalized understanding.  Discharged to home with spouse @ 1128. Condition Satisfactory .  Caroline Cerda RN

## 2019-02-20 NOTE — PATIENT INSTRUCTIONS
"  Call Dr. Dl Saldaña, Gastroenterology at [phone] if you have any problems or concerns.    We know you have a Choice in healthcare and appreciate you using [facility].  Our purpose is to provide you \"Excellent Care\".  We hope that you will always choose us in the future and continue to recommend us to your family and friends.    Infliximab infusion 735 mg  What is this medicine?  INFLIXIMAB (in FLIX i mab) is used to treat Crohn's disease and ulcerative colitis. It is also used to treat ankylosing spondylitis, plaque psoriasis, and some forms of arthritis.  This medicine may be used for other purposes; ask your health care provider or pharmacist if you have questions.  COMMON BRAND NAME(S): INFLECTRA, Remicade, RENFLEXIS  What should I tell my health care provider before I take this medicine?  They need to know if you have any of these conditions:  -cancer  -current or past resident of [place]  -diabetes  -exposure to tuberculosis  -Guillain-[place] syndrome  -heart failure  -hepatitis or liver disease  -immune system problems  -infection  -lung or breathing disease, like COPD  -multiple sclerosis  -receiving phototherapy for the skin  -seizure disorder  -an unusual or allergic reaction to infliximab, mouse proteins, other medicines, foods, dyes, or preservatives  -pregnant or trying to get pregnant  -breast-feeding  How should I use this medicine?  This medicine is for injection into a vein. It is usually given by a health care professional in a hospital or clinic setting.  A special MedGuide will be given to you by the pharmacist with each prescription and refill. Be sure to read this information carefully each time.  Talk to your pediatrician regarding the use of this medicine in children. While this drug may be prescribed for children as young as 6 years of age for selected conditions, precautions do apply.  Overdosage: If you think you have taken too much of " this medicine contact a poison control center or emergency room at once.  NOTE: This medicine is only for you. Do not share this medicine with others.  What if I miss a dose?  It is important not to miss your dose. Call your doctor or health care professional if you are unable to keep an appointment.  What may interact with this medicine?  Do not take this medicine with any of the following medications:  -biologic medicines such as abatacept, adalimumab, anakinra, certolizumab, etanercept, golimumab, rituximab, secukinumab, tocilizumab, tofactinib, ustekinumab  -live vaccines  This list may not describe all possible interactions. Give your health care provider a list of all the medicines, herbs, non-prescription drugs, or dietary supplements you use. Also tell them if you smoke, drink alcohol, or use illegal drugs. Some items may interact with your medicine.  What should I watch for while using this medicine?  Your condition will be monitored carefully while you are receiving this medicine. Visit your doctor or health care professional for regular checks on your progress. You may need blood work done while you are taking this medicine. Before beginning therapy, your doctor may do a test to see if you have been exposed to tuberculosis.  Call your doctor or health care professional for advice if you get a fever, chills or sore throat, or other symptoms of a cold or flu. Do not treat yourself. This drug decreases your body's ability to fight infections. Try to avoid being around people who are sick.  This medicine may make the symptoms of heart failure worse in some patients. If you notice symptoms such as increased shortness of breath or swelling of the ankles or legs, contact your health care provider right away.  If you are going to have surgery or dental work, tell your health care professional or dentist that you have received this medicine.  If you take this medicine for plaque psoriasis, stay out of the sun. If  you cannot avoid being in the sun, wear protective clothing and use sunscreen. Do not use sun lamps or tanning beds/booths.  Talk to your doctor about your risk of cancer. You may be more at risk for certain types of cancers if you take this medicine.  What side effects may I notice from receiving this medicine?  Side effects that you should report to your doctor or health care professional as soon as possible:  -allergic reactions like skin rash, itching or hives, swelling of the face, lips, or tongue  -breathing problems  -changes in vision  -chest pain  -fever or chills, usually related to the infusion  -joint pain  -pain, tingling, numbness in the hands or feet  -redness, blistering, peeling or loosening of the skin, including inside the mouth  -seizures  -signs of infection - fever or chills, cough, sore throat, flu-like symptoms, pain or difficulty passing urine  -signs and symptoms of liver injury like dark yellow or brown urine; general ill feeling; light-colored stools; loss of appetite; nausea; right upper belly pain; unusually weak or tired; yellowing of the eyes or skin  -signs and symptoms of a stroke like changes in vision; confusion; trouble speaking or understanding; severe headaches; sudden numbness or weakness of the face, arm or leg; trouble walking; dizziness; loss of balance or coordination  -swelling of the ankles, feet, or hands  -swollen lymph nodes in the neck, underarm, or groin areas  -unusual bleeding or bruising  -unusually weak or tired  Side effects that usually do not require medical attention (report to your doctor or health care professional if they continue or are bothersome):  -headache  -nausea  -stomach pain  -upset stomach  This list may not describe all possible side effects. Call your doctor for medical advice about side effects. You may report side effects to FDA at 9-041-FDA-7422.  Where should I keep my medicine?  This drug is given in a hospital or clinic and will not be  stored at home.  NOTE: This sheet is a summary. It may not cover all possible information. If you have questions about this medicine, talk to your doctor, pharmacist, or health care provider.  © 2018 [employer]/Gold Standard (2018-01-17 13:45:32)

## 2019-03-02 DIAGNOSIS — E03.9 HYPOTHYROIDISM, UNSPECIFIED TYPE: ICD-10-CM

## 2019-03-04 RX ORDER — VENLAFAXINE HYDROCHLORIDE 75 MG/1
CAPSULE, EXTENDED RELEASE ORAL
Qty: 90 CAPSULE | Refills: 0 | Status: SHIPPED | OUTPATIENT
Start: 2019-03-04 | End: 2019-06-03 | Stop reason: SDUPTHER

## 2019-03-04 RX ORDER — LEVOTHYROXINE SODIUM 0.1 MG/1
TABLET ORAL
Qty: 90 TABLET | Refills: 0 | Status: SHIPPED | OUTPATIENT
Start: 2019-03-04 | End: 2019-06-09 | Stop reason: SDUPTHER

## 2019-03-18 RX ORDER — OMEPRAZOLE 20 MG/1
CAPSULE, DELAYED RELEASE ORAL
Qty: 90 CAPSULE | Refills: 1 | Status: SHIPPED | OUTPATIENT
Start: 2019-03-18 | End: 2019-09-08 | Stop reason: SDUPTHER

## 2019-03-21 LAB
ALBUMIN SERPL-MCNC: 4.1 G/DL (ref 3.5–5.2)
ALBUMIN/GLOB SERPL: 1 G/DL
ALP SERPL-CCNC: 50 U/L (ref 39–117)
ALT SERPL-CCNC: 12 U/L (ref 1–33)
AST SERPL-CCNC: 18 U/L (ref 1–32)
BASOPHILS # BLD AUTO: 0.02 10*3/MM3 (ref 0–0.2)
BASOPHILS NFR BLD AUTO: 0.5 % (ref 0–1.5)
BILIRUB SERPL-MCNC: 0.6 MG/DL (ref 0.2–1.2)
BUN SERPL-MCNC: 6 MG/DL (ref 8–23)
BUN/CREAT SERPL: 10 (ref 7–25)
CALCIUM SERPL-MCNC: 9.4 MG/DL (ref 8.6–10.5)
CHLORIDE SERPL-SCNC: 94 MMOL/L (ref 98–107)
CHOLEST SERPL-MCNC: 223 MG/DL (ref 0–200)
CO2 SERPL-SCNC: 22 MMOL/L (ref 22–29)
CREAT SERPL-MCNC: 0.6 MG/DL (ref 0.57–1)
EOSINOPHIL # BLD AUTO: 0.13 10*3/MM3 (ref 0–0.4)
EOSINOPHIL NFR BLD AUTO: 3.2 % (ref 0.3–6.2)
ERYTHROCYTE [DISTWIDTH] IN BLOOD BY AUTOMATED COUNT: 12.6 % (ref 12.3–15.4)
FOLATE SERPL-MCNC: >20 NG/ML (ref 4.78–24.2)
GLOBULIN SER CALC-MCNC: 4 GM/DL
GLUCOSE SERPL-MCNC: 95 MG/DL (ref 65–99)
HCT VFR BLD AUTO: 41 % (ref 34–46.6)
HCV AB S/CO SERPL IA: <0.1 S/CO RATIO (ref 0–0.9)
HDLC SERPL-MCNC: 58 MG/DL (ref 40–60)
HGB BLD-MCNC: 13 G/DL (ref 12–15.9)
IMM GRANULOCYTES # BLD AUTO: 0.01 10*3/MM3 (ref 0–0.05)
IMM GRANULOCYTES NFR BLD AUTO: 0.2 % (ref 0–0.5)
LDLC SERPL CALC-MCNC: 132 MG/DL (ref 0–100)
LDLC/HDLC SERPL: 2.27 {RATIO}
LYMPHOCYTES # BLD AUTO: 1.24 10*3/MM3 (ref 0.7–3.1)
LYMPHOCYTES NFR BLD AUTO: 30.6 % (ref 19.6–45.3)
MCH RBC QN AUTO: 30.6 PG (ref 26.6–33)
MCHC RBC AUTO-ENTMCNC: 31.7 G/DL (ref 31.5–35.7)
MCV RBC AUTO: 96.5 FL (ref 79–97)
MONOCYTES # BLD AUTO: 0.62 10*3/MM3 (ref 0.1–0.9)
MONOCYTES NFR BLD AUTO: 15.3 % (ref 5–12)
NEUTROPHILS # BLD AUTO: 2.03 10*3/MM3 (ref 1.4–7)
NEUTROPHILS NFR BLD AUTO: 50.2 % (ref 42.7–76)
NRBC BLD AUTO-RTO: 0 /100 WBC (ref 0–0)
PLATELET # BLD AUTO: 296 10*3/MM3 (ref 140–450)
POTASSIUM SERPL-SCNC: 4.3 MMOL/L (ref 3.5–5.2)
PROT SERPL-MCNC: 8.1 G/DL (ref 6–8.5)
RBC # BLD AUTO: 4.25 10*6/MM3 (ref 3.77–5.28)
SODIUM SERPL-SCNC: 132 MMOL/L (ref 136–145)
T4 FREE SERPL-MCNC: 1.46 NG/DL (ref 0.93–1.7)
TRIGL SERPL-MCNC: 166 MG/DL (ref 0–150)
TSH SERPL DL<=0.005 MIU/L-ACNC: 1.24 MIU/ML (ref 0.27–4.2)
VIT B12 SERPL-MCNC: 700 PG/ML (ref 211–946)
VLDLC SERPL CALC-MCNC: 33.2 MG/DL (ref 5–40)
WBC # BLD AUTO: 4.05 10*3/MM3 (ref 3.4–10.8)

## 2019-03-28 ENCOUNTER — TRANSCRIBE ORDERS (OUTPATIENT)
Dept: ADMINISTRATIVE | Facility: HOSPITAL | Age: 69
End: 2019-03-28

## 2019-03-28 ENCOUNTER — OFFICE VISIT (OUTPATIENT)
Dept: INTERNAL MEDICINE | Facility: CLINIC | Age: 69
End: 2019-03-28

## 2019-03-28 VITALS
RESPIRATION RATE: 16 BRPM | TEMPERATURE: 98 F | WEIGHT: 166 LBS | SYSTOLIC BLOOD PRESSURE: 118 MMHG | HEART RATE: 84 BPM | BODY MASS INDEX: 30.55 KG/M2 | HEIGHT: 62 IN | OXYGEN SATURATION: 97 % | DIASTOLIC BLOOD PRESSURE: 80 MMHG

## 2019-03-28 DIAGNOSIS — E78.5 HYPERLIPIDEMIA, UNSPECIFIED HYPERLIPIDEMIA TYPE: Primary | ICD-10-CM

## 2019-03-28 DIAGNOSIS — K51.311 ULCERATIVE RECTOSIGMOIDITIS WITH RECTAL BLEEDING (HCC): ICD-10-CM

## 2019-03-28 DIAGNOSIS — Z00.00 ROUTINE HEALTH MAINTENANCE: ICD-10-CM

## 2019-03-28 DIAGNOSIS — Z12.39 SCREENING BREAST EXAMINATION: Primary | ICD-10-CM

## 2019-03-28 DIAGNOSIS — L57.0 ACTINIC KERATOSIS: ICD-10-CM

## 2019-03-28 DIAGNOSIS — M85.88 OSTEOPENIA OF SPINE: ICD-10-CM

## 2019-03-28 DIAGNOSIS — E03.9 HYPOTHYROIDISM, UNSPECIFIED TYPE: ICD-10-CM

## 2019-03-28 DIAGNOSIS — I45.6 WOLFF-PARKINSON-WHITE (WPW) PATTERN: ICD-10-CM

## 2019-03-28 DIAGNOSIS — D75.89 MACROCYTOSIS WITHOUT ANEMIA: ICD-10-CM

## 2019-03-28 DIAGNOSIS — F32.A DEPRESSION, UNSPECIFIED DEPRESSION TYPE: ICD-10-CM

## 2019-03-28 PROCEDURE — 99214 OFFICE O/P EST MOD 30 MIN: CPT | Performed by: FAMILY MEDICINE

## 2019-03-28 PROCEDURE — 17000 DESTRUCT PREMALG LESION: CPT | Performed by: FAMILY MEDICINE

## 2019-03-28 PROCEDURE — 17003 DESTRUCT PREMALG LES 2-14: CPT | Performed by: FAMILY MEDICINE

## 2019-03-28 NOTE — PROGRESS NOTES
Subjective     Yelena Rangel is a 68 y.o. female, who presents with a chief complaint of   Chief Complaint   Patient presents with   • Hyperlipidemia   • Hypothyroidism       Hyperlipidemia   Exacerbating diseases include hypothyroidism.   Hypothyroidism     1. Hyperlipidemia.  Doesn't tolerate statins.  She has been exercising regularly.    2. Ulcerative colitis.  On Remicade every 2 months per Dr. Saldaña.  On mesalamine.  She is feeling well.    3. Depression.  Pt reports she is still doing well with venlafaxine.    The following portions of the patient's history were reviewed and updated as appropriate: allergies, current medications, past family history, past medical history, past social history, past surgical history and problem list.    Allergies: Sulfa antibiotics    Review of Systems   Constitutional: Negative.    HENT: Negative.    Eyes: Negative.    Respiratory: Negative.    Cardiovascular: Negative.    Gastrointestinal: Negative.    Endocrine: Negative.    Genitourinary: Negative.    Musculoskeletal: Negative.    Skin: Negative.    Allergic/Immunologic: Positive for environmental allergies.   Neurological: Negative.    Hematological: Negative.    Psychiatric/Behavioral: Negative.        Objective     Wt Readings from Last 3 Encounters:   03/28/19 75.3 kg (166 lb)   02/19/19 73.5 kg (162 lb)   12/26/18 74.4 kg (164 lb)     Temp Readings from Last 3 Encounters:   03/28/19 98 °F (36.7 °C) (Oral)   02/20/19 97.5 °F (36.4 °C) (Temporal)   12/26/18 97.6 °F (36.4 °C) (Oral)     BP Readings from Last 3 Encounters:   03/28/19 118/80   02/20/19 120/76   12/26/18 134/74     Pulse Readings from Last 3 Encounters:   03/28/19 84   02/20/19 69   12/26/18 71     Body mass index is 30.36 kg/m².  SpO2 Readings from Last 3 Encounters:   09/27/17 98%   03/29/17 98%   09/28/16 97%       Physical Exam   Constitutional: She is oriented to person, place, and time. She appears well-developed and well-nourished.   HENT:    Head: Normocephalic.   Mouth/Throat: Oropharynx is clear and moist.   Eyes: Conjunctivae and EOM are normal.   Neck: Neck supple. No thyromegaly present.   Cardiovascular: Normal rate, regular rhythm and normal heart sounds.   Pulmonary/Chest: Effort normal and breath sounds normal.   Abdominal: Soft. Bowel sounds are normal. There is no hepatosplenomegaly.   Musculoskeletal: Normal range of motion. She exhibits no edema.   Neurological: She is alert and oriented to person, place, and time.   Skin: Skin is warm and dry. No rash noted.   Crusty papule left chest and right shin.   Psychiatric: She has a normal mood and affect. Her behavior is normal.   Nursing note and vitals reviewed.      Results for orders placed or performed in visit on 12/19/18   Comprehensive Metabolic Panel   Result Value Ref Range    Glucose 95 65 - 99 mg/dL    BUN 6 (L) 8 - 23 mg/dL    Creatinine 0.60 0.57 - 1.00 mg/dL    eGFR Non African Am 100 >60 mL/min/1.73    eGFR African Am 121 >60 mL/min/1.73    BUN/Creatinine Ratio 10.0 7.0 - 25.0    Sodium 132 (L) 136 - 145 mmol/L    Potassium 4.3 3.5 - 5.2 mmol/L    Chloride 94 (L) 98 - 107 mmol/L    Total CO2 22.0 22.0 - 29.0 mmol/L    Calcium 9.4 8.6 - 10.5 mg/dL    Total Protein 8.1 6.0 - 8.5 g/dL    Albumin 4.10 3.50 - 5.20 g/dL    Globulin 4.0 gm/dL    A/G Ratio 1.0 g/dL    Total Bilirubin 0.6 0.2 - 1.2 mg/dL    Alkaline Phosphatase 50 39 - 117 U/L    AST (SGOT) 18 1 - 32 U/L    ALT (SGPT) 12 1 - 33 U/L   TSH   Result Value Ref Range    TSH 1.240 0.270 - 4.200 mIU/mL   T4, free   Result Value Ref Range    Free T4 1.46 0.93 - 1.70 ng/dL   Vitamin B12   Result Value Ref Range    Vitamin B-12 700 211 - 946 pg/mL   Lipid Panel With LDL / HDL Ratio   Result Value Ref Range    Total Cholesterol 223 (H) 0 - 200 mg/dL    Triglycerides 166 (H) 0 - 150 mg/dL    HDL Cholesterol 58 40 - 60 mg/dL    VLDL Cholesterol 33.2 5 - 40 mg/dL    LDL Cholesterol  132 (H) 0 - 100 mg/dL    LDL/HDL Ratio 2.27     Hepatitis C antibody   Result Value Ref Range    Hep C Virus Ab <0.1 0.0 - 0.9 s/co ratio   Folate   Result Value Ref Range    Folate >20.00 4.78 - 24.20 ng/mL   CBC w AUTO Differential   Result Value Ref Range    WBC 4.05 3.40 - 10.80 10*3/mm3    RBC 4.25 3.77 - 5.28 10*6/mm3    Hemoglobin 13.0 12.0 - 15.9 g/dL    Hematocrit 41.0 34.0 - 46.6 %    MCV 96.5 79.0 - 97.0 fL    MCH 30.6 26.6 - 33.0 pg    MCHC 31.7 31.5 - 35.7 g/dL    RDW 12.6 12.3 - 15.4 %    Platelets 296 140 - 450 10*3/mm3    Neutrophil Rel % 50.2 42.7 - 76.0 %    Lymphocyte Rel % 30.6 19.6 - 45.3 %    Monocyte Rel % 15.3 (H) 5.0 - 12.0 %    Eosinophil Rel % 3.2 0.3 - 6.2 %    Basophil Rel % 0.5 0.0 - 1.5 %    Neutrophils Absolute 2.03 1.40 - 7.00 10*3/mm3    Lymphocytes Absolute 1.24 0.70 - 3.10 10*3/mm3    Monocytes Absolute 0.62 0.10 - 0.90 10*3/mm3    Eosinophils Absolute 0.13 0.00 - 0.40 10*3/mm3    Basophils Absolute 0.02 0.00 - 0.20 10*3/mm3    Immature Granulocyte Rel % 0.2 0.0 - 0.5 %    Immature Grans Absolute 0.01 0.00 - 0.05 10*3/mm3    nRBC 0.0 0.0 - 0.0 /100 WBC       Assessment/Plan   Yelena was seen today for hyperlipidemia and hypothyroidism.    Diagnoses and all orders for this visit:    Hyperlipidemia, unspecified hyperlipidemia type  -     Comprehensive Metabolic Panel; Future  -     Lipid Panel With / Chol / HDL Ratio; Future    Hypothyroidism, unspecified type  -     CBC & Differential; Future  -     TSH; Future  -     T4, Free; Future    Depression, unspecified depression type    Osteopenia of spine  -     DEXA Bone Density Axial; Future    Macrocytosis without anemia    Ulcerative rectosigmoiditis with rectal bleeding (CMS/HCC)    Liseth-Parkinson-White (WPW) pattern    Routine health maintenance    Actinic keratosis    1. Hyperlipidemia.  Off statin.  Continue lifestyle measures.  She has brought her LDL down from 150s to 130s.    2. Hypothyroidism.  Adequately replaced.    3. Depression.  Controlled.  Continue venlafaxine and  lifestyle measures.     4. Osteopenia.  Calcium, vitamin D, and weight bearing exercise.  Next dexa scan due now.    5. Macrocytosis. B12 and folate normal.  This resolved off Azasan.    6. Ulcerative colitis.  Well-controlled.  Continue per Dr. Saldaña.    7. Liseth-Parkinson-White.  She will see cardiology next week prior to her knee replacements.    8. Routine health maint.  Prevnar and Pneumovax UTD.  Mammogram UTD.      9. Actinic keratoses X 2.  Treated with cryotherapy 15 second freeze-thaw cycle X 2.  Pt tolerated well.  Instructions given.      Outpatient Medications Prior to Visit   Medication Sig Dispense Refill   • calcium citrate-vitamin D (CITRACAL+D) 315-200 MG-UNIT per tablet Take 1 tablet by mouth Daily.     • Calcium Citrate-Vitamin D (CITRUS CALCIUM 1500 + D PO) Take 1,500 tablets by mouth Daily. vitamin d3 800 iu     • InFLIXimab (REMICADE IV) Infuse 1 dose into a venous catheter Every 30 (Thirty) Days. Every 60 days.  Dr. Saldaña.     • levothyroxine (SYNTHROID, LEVOTHROID) 100 MCG tablet TAKE ONE TABLET BY MOUTH DAILY 90 tablet 0   • mesalamine (APRISO) 0.375 g 24 hr capsule Take 4 capsules by mouth Daily. 360 capsule 3   • Multiple Vitamins-Minerals (MULTIVITAMIN WITH MINERALS) tablet tablet Take 1 tablet by mouth Daily.     • omeprazole (priLOSEC) 20 MG capsule TAKE ONE CAPSULE BY MOUTH DAILY 90 capsule 1   • Probiotic Product (TRUNATURE DIGESTIVE PROBIOTIC PO) Take  by mouth Daily.     • venlafaxine XR (EFFEXOR-XR) 75 MG 24 hr capsule TAKE ONE CAPSULE BY MOUTH DAILY 90 capsule 0   • cetirizine (ZyrTEC) 10 MG tablet Take 10 mg by mouth daily.       No facility-administered medications prior to visit.      No orders of the defined types were placed in this encounter.    [unfilled]  There are no discontinued medications.    Return in about 6 months (around 9/28/2019).

## 2019-04-11 ENCOUNTER — OFFICE VISIT (OUTPATIENT)
Dept: GASTROENTEROLOGY | Facility: CLINIC | Age: 69
End: 2019-04-11

## 2019-04-11 VITALS
SYSTOLIC BLOOD PRESSURE: 124 MMHG | DIASTOLIC BLOOD PRESSURE: 78 MMHG | BODY MASS INDEX: 30.59 KG/M2 | HEIGHT: 62 IN | WEIGHT: 166.2 LBS

## 2019-04-11 DIAGNOSIS — Z11.59 ENCOUNTER FOR SCREENING FOR OTHER VIRAL DISEASES: ICD-10-CM

## 2019-04-11 DIAGNOSIS — M17.0 PRIMARY OSTEOARTHRITIS OF KNEES, BILATERAL: ICD-10-CM

## 2019-04-11 DIAGNOSIS — K51.20 ULCERATIVE PROCTITIS WITHOUT COMPLICATION (HCC): Primary | ICD-10-CM

## 2019-04-11 PROCEDURE — 99213 OFFICE O/P EST LOW 20 MIN: CPT | Performed by: INTERNAL MEDICINE

## 2019-04-11 NOTE — PROGRESS NOTES
"    PATIENT INFORMATION  Yelena Rangel       - 1950    CHIEF COMPLAINT  Chief Complaint   Patient presents with   • Follow-up     4 mo follow up on ulcerative rectosigmoiditis with rectal bleeding       HISTORY OF PRESENT ILLNESS  Overall her colon is good and she has in the past 3 days( 6-7 weeks out form her last Remicade) developed wandering arthritis of right MCP and BOT and left \"little finger\" and left shoulder.    Has been rowing machine and a stationary bycicle. She is not takling tylenol but knows she can so we reviewed IBD related arthritis.            REVIEW OF SYSTEMS  Review of Systems   Musculoskeletal: Positive for arthralgias.   All other systems reviewed and are negative.        ACTIVE PROBLEMS  Patient Active Problem List    Diagnosis   • Leukopenia [D72.819]   • Macrocytosis without anemia [D75.89]   • Depression [F32.9]   • Hyperlipidemia [E78.5]   • Hypothyroidism [E03.9]   • Osteopenia [M85.80]   • Liseth-Parkinson-White (WPW) pattern [I45.6]   • Ulcerative colitis (CMS/HCC) [K51.90]   • Allergic rhinitis [J30.9]   • Primary osteoarthritis of knees, bilateral [M17.0]         PAST MEDICAL HISTORY  Past Medical History:   Diagnosis Date   • Arthritis    • Colon polyp    • Disease of thyroid gland    • GERD (gastroesophageal reflux disease)    • Hyperlipidemia    • Hypothyroidism    • Osteopenia    • Ulcerative (chronic) rectosigmoiditis with rectal bleeding (CMS/HCC)    • Ulcerative colitis (CMS/HCC)    • WPW (Liseth-Parkinson-White syndrome)          SURGICAL HISTORY  Past Surgical History:   Procedure Laterality Date   • COLONOSCOPY N/A 2016    Procedure: COLONOSCOPY with biopsies;  Surgeon: Dl Saldaña MD;  Location: [facility];  Service:    • COLONOSCOPY W/ POLYPECTOMY     • HYSTERECTOMY     • REPLACEMENT TOTAL KNEE Left 10/09/2018    [facility], Dr. Simon Barber, surgeon   • SIGMOIDOSCOPY N/A 2017    Procedure: SIGMOIDOSCOPY FLEXIBLE with biopsy;  " Surgeon: Dl Saldaña MD;  Location: [facility];  Service:    • THYROID LOBECTOMY Right    • TOTAL KNEE ARTHROPLASTY Right 11/20/2018         FAMILY HISTORY  Family History   Problem Relation Age of Onset   • Breast cancer Mother    • Breast cancer Sister    • Colon cancer Father    • Colon cancer Brother          SOCIAL HISTORY  Social History     Occupational History   • Not on file   Tobacco Use   • Smoking status: Never Smoker   • Smokeless tobacco: Never Used   Substance and Sexual Activity   • Alcohol use: Yes     Comment: twice week   • Drug use: No   • Sexual activity: Defer       Debilities/Disabilities Identified: None    Emotional Behavior: Appropriate    CURRENT MEDICATIONS    Current Outpatient Medications:   •  calcium citrate-vitamin D (CITRACAL+D) 315-200 MG-UNIT per tablet, Take 1 tablet by mouth Daily., Disp: , Rfl:   •  Calcium Citrate-Vitamin D (CITRUS CALCIUM 1500 + D PO), Take 1,500 tablets by mouth Daily. vitamin d3 800 iu, Disp: , Rfl:   •  cetirizine (ZyrTEC) 10 MG tablet, Take 10 mg by mouth daily., Disp: , Rfl:   •  InFLIXimab (REMICADE IV), Infuse 1 dose into a venous catheter Every 30 (Thirty) Days. Every 60 days.  Dr. Saldaña., Disp: , Rfl:   •  levothyroxine (SYNTHROID, LEVOTHROID) 100 MCG tablet, TAKE ONE TABLET BY MOUTH DAILY, Disp: 90 tablet, Rfl: 0  •  mesalamine (APRISO) 0.375 g 24 hr capsule, Take 4 capsules by mouth Daily., Disp: 360 capsule, Rfl: 3  •  Multiple Vitamins-Minerals (MULTIVITAMIN WITH MINERALS) tablet tablet, Take 1 tablet by mouth Daily., Disp: , Rfl:   •  omeprazole (priLOSEC) 20 MG capsule, TAKE ONE CAPSULE BY MOUTH DAILY, Disp: 90 capsule, Rfl: 1  •  Probiotic Product (TRUNATURE DIGESTIVE PROBIOTIC PO), Take  by mouth Daily., Disp: , Rfl:   •  venlafaxine XR (EFFEXOR-XR) 75 MG 24 hr capsule, TAKE ONE CAPSULE BY MOUTH DAILY, Disp: 90 capsule, Rfl: 0    ALLERGIES  Sulfa antibiotics    VITALS  Vitals:    04/11/19 1013   BP: 124/78   Weight: 75.4 kg  "(166 lb 3.2 oz)   Height: 157.5 cm (62.01\")       LAST RESULTS   Results Encounter on 12/19/2018   Component Date Value Ref Range Status   • Glucose 03/20/2019 95  65 - 99 mg/dL Final   • BUN 03/20/2019 6* 8 - 23 mg/dL Final   • Creatinine 03/20/2019 0.60  0.57 - 1.00 mg/dL Final   • eGFR Non African Am 03/20/2019 100  >60 mL/min/1.73 Final   • eGFR African Am 03/20/2019 121  >60 mL/min/1.73 Final   • BUN/Creatinine Ratio 03/20/2019 10.0  7.0 - 25.0 Final   • Sodium 03/20/2019 132* 136 - 145 mmol/L Final   • Potassium 03/20/2019 4.3  3.5 - 5.2 mmol/L Final   • Chloride 03/20/2019 94* 98 - 107 mmol/L Final   • Total CO2 03/20/2019 22.0  22.0 - 29.0 mmol/L Final   • Calcium 03/20/2019 9.4  8.6 - 10.5 mg/dL Final   • Total Protein 03/20/2019 8.1  6.0 - 8.5 g/dL Final   • Albumin 03/20/2019 4.10  3.50 - 5.20 g/dL Final   • Globulin 03/20/2019 4.0  gm/dL Final   • A/G Ratio 03/20/2019 1.0  g/dL Final   • Total Bilirubin 03/20/2019 0.6  0.2 - 1.2 mg/dL Final   • Alkaline Phosphatase 03/20/2019 50  39 - 117 U/L Final   • AST (SGOT) 03/20/2019 18  1 - 32 U/L Final   • ALT (SGPT) 03/20/2019 12  1 - 33 U/L Final   • TSH 03/20/2019 1.240  0.270 - 4.200 mIU/mL Final   • Free T4 03/20/2019 1.46  0.93 - 1.70 ng/dL Final   • WBC 03/20/2019 4.05  3.40 - 10.80 10*3/mm3 Final   • RBC 03/20/2019 4.25  3.77 - 5.28 10*6/mm3 Final   • Hemoglobin 03/20/2019 13.0  12.0 - 15.9 g/dL Final   • Hematocrit 03/20/2019 41.0  34.0 - 46.6 % Final   • MCV 03/20/2019 96.5  79.0 - 97.0 fL Final   • MCH 03/20/2019 30.6  26.6 - 33.0 pg Final   • MCHC 03/20/2019 31.7  31.5 - 35.7 g/dL Final   • RDW 03/20/2019 12.6  12.3 - 15.4 % Final   • Platelets 03/20/2019 296  140 - 450 10*3/mm3 Final   • Neutrophil Rel % 03/20/2019 50.2  42.7 - 76.0 % Final   • Lymphocyte Rel % 03/20/2019 30.6  19.6 - 45.3 % Final   • Monocyte Rel % 03/20/2019 15.3* 5.0 - 12.0 % Final   • Eosinophil Rel % 03/20/2019 3.2  0.3 - 6.2 % Final   • Basophil Rel % 03/20/2019 0.5  0.0 - 1.5 " % Final   • Neutrophils Absolute 03/20/2019 2.03  1.40 - 7.00 10*3/mm3 Final   • Lymphocytes Absolute 03/20/2019 1.24  0.70 - 3.10 10*3/mm3 Final   • Monocytes Absolute 03/20/2019 0.62  0.10 - 0.90 10*3/mm3 Final   • Eosinophils Absolute 03/20/2019 0.13  0.00 - 0.40 10*3/mm3 Final   • Basophils Absolute 03/20/2019 0.02  0.00 - 0.20 10*3/mm3 Final   • Immature Granulocyte Rel % 03/20/2019 0.2  0.0 - 0.5 % Final   • Immature Grans Absolute 03/20/2019 0.01  0.00 - 0.05 10*3/mm3 Final   • nRBC 03/20/2019 0.0  0.0 - 0.0 /100 WBC Final   • Vitamin B-12 03/20/2019 700  211 - 946 pg/mL Final   • Total Cholesterol 03/20/2019 223* 0 - 200 mg/dL Final   • Triglycerides 03/20/2019 166* 0 - 150 mg/dL Final   • HDL Cholesterol 03/20/2019 58  40 - 60 mg/dL Final   • VLDL Cholesterol 03/20/2019 33.2  5 - 40 mg/dL Final   • LDL Cholesterol  03/20/2019 132* 0 - 100 mg/dL Final   • LDL/HDL Ratio 03/20/2019 2.27   Final   • Hep C Virus Ab 03/20/2019 <0.1  0.0 - 0.9 s/co ratio Final    Comment:                                   Negative:     < 0.8                               Indeterminate: 0.8 - 0.9                                    Positive:     > 0.9   The CDC recommends that a positive HCV antibody result   be followed up with a HCV Nucleic Acid Amplification   test ([id number]).     • Folate 03/20/2019 >20.00  4.78 - 24.20 ng/mL Final     No results found.    PHYSICAL EXAM  Physical Exam   Constitutional: She is oriented to person, place, and time. She appears well-developed and well-nourished.   HENT:   Head: Normocephalic and atraumatic.   Eyes: Conjunctivae and EOM are normal. Pupils are equal, round, and reactive to light. No scleral icterus.   Neck: Normal range of motion. Neck supple. No thyromegaly present.   Cardiovascular: Normal rate, regular rhythm, normal heart sounds and intact distal pulses. Exam reveals no gallop.   No murmur heard.  Pulmonary/Chest: Effort normal and breath sounds normal. She has no wheezes. She has  no rales.   Abdominal: Soft. Bowel sounds are normal. She exhibits no shifting dullness, no distension, no fluid wave, no abdominal bruit, no ascites and no mass. There is no hepatosplenomegaly. There is no tenderness. There is no guarding and negative Sorto's sign. Hernia confirmed negative in the ventral area.   Musculoskeletal: Normal range of motion. She exhibits no edema.   Lymphadenopathy:     She has no cervical adenopathy.   Neurological: She is alert and oriented to person, place, and time.   Skin: Skin is warm and dry. No rash noted. She is not diaphoretic. No erythema.       ASSESSMENT  Diagnoses and all orders for this visit:    Ulcerative proctitis without complication (CMS/HCC)  -     Hepatitis B Core Antibody, Total  -     QuantiFERON TB Gold; Future    Encounter for screening for other viral diseases   -     Hepatitis B Core Antibody, Total    Primary osteoarthritis of knees, bilateral          PLAN  Encouraged tylenol arthritis and will recheck her HBV and TB antibodies. Also will let her decrease her Apriso from 3 daily to 2 daily    Return in about 6 months (around 10/11/2019).

## 2019-04-17 ENCOUNTER — HOSPITAL ENCOUNTER (OUTPATIENT)
Dept: INFUSION THERAPY | Facility: HOSPITAL | Age: 69
Discharge: HOME | End: 2019-04-17
Admitting: INTERNAL MEDICINE

## 2019-04-17 VITALS
RESPIRATION RATE: 16 BRPM | HEIGHT: 62 IN | TEMPERATURE: 98.7 F | WEIGHT: 161 LBS | OXYGEN SATURATION: 95 % | SYSTOLIC BLOOD PRESSURE: 139 MMHG | HEART RATE: 69 BPM | BODY MASS INDEX: 29.63 KG/M2 | DIASTOLIC BLOOD PRESSURE: 81 MMHG

## 2019-04-17 DIAGNOSIS — K51.311 ULCERATIVE RECTOSIGMOIDITIS WITH RECTAL BLEEDING (HCC): Primary | ICD-10-CM

## 2019-04-17 DIAGNOSIS — K51.20 ULCERATIVE PROCTITIS WITHOUT COMPLICATION (HCC): ICD-10-CM

## 2019-04-17 PROCEDURE — 86480 TB TEST CELL IMMUN MEASURE: CPT | Performed by: INTERNAL MEDICINE

## 2019-04-17 PROCEDURE — 96365 THER/PROPH/DIAG IV INF INIT: CPT

## 2019-04-17 PROCEDURE — 86704 HEP B CORE ANTIBODY TOTAL: CPT | Performed by: INTERNAL MEDICINE

## 2019-04-17 PROCEDURE — 36415 COLL VENOUS BLD VENIPUNCTURE: CPT

## 2019-04-17 PROCEDURE — 96366 THER/PROPH/DIAG IV INF ADDON: CPT

## 2019-04-17 PROCEDURE — 96415 CHEMO IV INFUSION ADDL HR: CPT

## 2019-04-17 PROCEDURE — 36415 COLL VENOUS BLD VENIPUNCTURE: CPT | Performed by: INTERNAL MEDICINE

## 2019-04-17 PROCEDURE — 96413 CHEMO IV INFUSION 1 HR: CPT

## 2019-04-17 PROCEDURE — 25010000002 INFLIXIMAB PER 10 MG: Performed by: INTERNAL MEDICINE

## 2019-04-17 RX ADMIN — INFLIXIMAB 730 MG: 100 INJECTION, POWDER, LYOPHILIZED, FOR SOLUTION INTRAVENOUS at 09:10

## 2019-04-17 NOTE — PATIENT INSTRUCTIONS
"  Call Dr. Dl Saldaña, Gastroenterology at [phone] if you have any problems or concerns.    We know you have a Choice in healthcare and appreciate you using [facility].  Our purpose is to provide you \"Excellent Care\".  We hope that you will always choose us in the future and continue to recommend us to your family and friends.              "

## 2019-04-17 NOTE — NURSING NOTE
NURSING PROGRESS NOTE      9105 Pt to [facility] per ambulatory with spouse .Pt ID verified by (2) identifiers. Allergies, medications and medical history reviewed and verified. Labs drawn , started Remicade w/o difficulty, tolerated treatment without incident. Patient received AVS, Pt verbalized understanding. Next appointments scheduled. Discharged to home per ambulatory  @ 1150.. Condition Satisfactory .  Caroline Cerda RN

## 2019-04-18 LAB — HBV CORE AB SER DONR QL IA: NEGATIVE

## 2019-04-19 LAB
QUANTIFERON CRITERIA: NORMAL
QUANTIFERON INCUBATION: NORMAL
QUANTIFERON MITOGEN VALUE: 3.8 IU/ML
QUANTIFERON NIL VALUE: 0.04 IU/ML
QUANTIFERON TB1 AG VALUE: 0.04 IU/ML
QUANTIFERON TB2 AG VALUE: 0.03 IU/ML
QUANTIFERON-TB GOLD PLUS: NEGATIVE

## 2019-06-03 RX ORDER — VENLAFAXINE HYDROCHLORIDE 75 MG/1
CAPSULE, EXTENDED RELEASE ORAL
Qty: 90 CAPSULE | Refills: 1 | Status: SHIPPED | OUTPATIENT
Start: 2019-06-03 | End: 2019-11-29 | Stop reason: SDUPTHER

## 2019-06-09 DIAGNOSIS — E03.9 HYPOTHYROIDISM, UNSPECIFIED TYPE: ICD-10-CM

## 2019-06-10 RX ORDER — LEVOTHYROXINE SODIUM 0.1 MG/1
TABLET ORAL
Qty: 90 TABLET | Refills: 1 | Status: SHIPPED | OUTPATIENT
Start: 2019-06-10 | End: 2019-11-29 | Stop reason: SDUPTHER

## 2019-06-11 NOTE — PATIENT INSTRUCTIONS
"  Call  Dr. Duncan, at [facility] at [phone] if you have any problems or concerns.    We know you have a Choice in healthcare and appreciate you using [facility].  Our purpose is to provide you \"Excellent Care\".  We hope that you will always choose us in the future and continue to recommend us to your family and friends.            Infliximab infusion  What is this medicine?  INFLIXIMAB (in FLIX i mab) is used to treat Crohn's disease and ulcerative colitis. It is also used to treat ankylosing spondylitis, plaque psoriasis, and some forms of arthritis.  This medicine may be used for other purposes; ask your health care provider or pharmacist if you have questions.  COMMON BRAND NAME(S): INFLECTRA, Remicade, RENFLEXIS  What should I tell my health care provider before I take this medicine?  They need to know if you have any of these conditions:  -cancer  -current or past resident of [place]  -diabetes  -exposure to tuberculosis  -Guillain-Black Diamond syndrome  -heart failure  -hepatitis or liver disease  -immune system problems  -infection  -lung or breathing disease, like COPD  -multiple sclerosis  -receiving phototherapy for the skin  -seizure disorder  -an unusual or allergic reaction to infliximab, mouse proteins, other medicines, foods, dyes, or preservatives  -pregnant or trying to get pregnant  -breast-feeding  How should I use this medicine?  This medicine is for injection into a vein. It is usually given by a health care professional in a hospital or clinic setting.  A special MedGuide will be given to you by the pharmacist with each prescription and refill. Be sure to read this information carefully each time.  Talk to your pediatrician regarding the use of this medicine in children. While this drug may be prescribed for children as young as 6 years of age for selected conditions, precautions do apply.  Overdosage: If you think you have " taken too much of this medicine contact a poison control center or emergency room at once.  NOTE: This medicine is only for you. Do not share this medicine with others.  What if I miss a dose?  It is important not to miss your dose. Call your doctor or health care professional if you are unable to keep an appointment.  What may interact with this medicine?  Do not take this medicine with any of the following medications:  -biologic medicines such as abatacept, adalimumab, anakinra, certolizumab, etanercept, golimumab, rituximab, secukinumab, tocilizumab, tofactinib, ustekinumab  -live vaccines  This list may not describe all possible interactions. Give your health care provider a list of all the medicines, herbs, non-prescription drugs, or dietary supplements you use. Also tell them if you smoke, drink alcohol, or use illegal drugs. Some items may interact with your medicine.  What should I watch for while using this medicine?  Your condition will be monitored carefully while you are receiving this medicine. Visit your doctor or health care professional for regular checks on your progress. You may need blood work done while you are taking this medicine. Before beginning therapy, your doctor may do a test to see if you have been exposed to tuberculosis.  Call your doctor or health care professional for advice if you get a fever, chills or sore throat, or other symptoms of a cold or flu. Do not treat yourself. This drug decreases your body's ability to fight infections. Try to avoid being around people who are sick.  This medicine may make the symptoms of heart failure worse in some patients. If you notice symptoms such as increased shortness of breath or swelling of the ankles or legs, contact your health care provider right away.  If you are going to have surgery or dental work, tell your health care professional or dentist that you have received this medicine.  If you take this medicine for plaque psoriasis, stay  out of the sun. If you cannot avoid being in the sun, wear protective clothing and use sunscreen. Do not use sun lamps or tanning beds/booths.  Talk to your doctor about your risk of cancer. You may be more at risk for certain types of cancers if you take this medicine.  What side effects may I notice from receiving this medicine?  Side effects that you should report to your doctor or health care professional as soon as possible:  -allergic reactions like skin rash, itching or hives, swelling of the face, lips, or tongue  -breathing problems  -changes in vision  -chest pain  -fever or chills, usually related to the infusion  -joint pain  -pain, tingling, numbness in the hands or feet  -redness, blistering, peeling or loosening of the skin, including inside the mouth  -seizures  -signs of infection - fever or chills, cough, sore throat, flu-like symptoms, pain or difficulty passing urine  -signs and symptoms of liver injury like dark yellow or brown urine; general ill feeling; light-colored stools; loss of appetite; nausea; right upper belly pain; unusually weak or tired; yellowing of the eyes or skin  -signs and symptoms of a stroke like changes in vision; confusion; trouble speaking or understanding; severe headaches; sudden numbness or weakness of the face, arm or leg; trouble walking; dizziness; loss of balance or coordination  -swelling of the ankles, feet, or hands  -swollen lymph nodes in the neck, underarm, or groin areas  -unusual bleeding or bruising  -unusually weak or tired  Side effects that usually do not require medical attention (report to your doctor or health care professional if they continue or are bothersome):  -headache  -nausea  -stomach pain  -upset stomach  This list may not describe all possible side effects. Call your doctor for medical advice about side effects. You may report side effects to FDA at 9-634-FDA-2768.  Where should I keep my medicine?  This drug is given in a hospital or  clinic and will not be stored at home.  NOTE: This sheet is a summary. It may not cover all possible information. If you have questions about this medicine, talk to your doctor, pharmacist, or health care provider.  © 2019 [employer]/Gold Standard (2018-01-17 13:45:32)

## 2019-06-12 ENCOUNTER — HOSPITAL ENCOUNTER (OUTPATIENT)
Dept: INFUSION THERAPY | Facility: HOSPITAL | Age: 69
Discharge: HOME | End: 2019-06-12
Admitting: INTERNAL MEDICINE

## 2019-06-12 VITALS
BODY MASS INDEX: 29.63 KG/M2 | HEART RATE: 68 BPM | DIASTOLIC BLOOD PRESSURE: 88 MMHG | TEMPERATURE: 96 F | OXYGEN SATURATION: 97 % | WEIGHT: 161 LBS | SYSTOLIC BLOOD PRESSURE: 164 MMHG | HEIGHT: 62 IN | RESPIRATION RATE: 16 BRPM

## 2019-06-12 DIAGNOSIS — K51.311 ULCERATIVE RECTOSIGMOIDITIS WITH RECTAL BLEEDING (HCC): Primary | ICD-10-CM

## 2019-06-12 DIAGNOSIS — K51.20 ULCERATIVE PROCTITIS WITHOUT COMPLICATION (HCC): ICD-10-CM

## 2019-06-12 PROCEDURE — 96415 CHEMO IV INFUSION ADDL HR: CPT

## 2019-06-12 PROCEDURE — 25010000002 INFLIXIMAB PER 10 MG: Performed by: INTERNAL MEDICINE

## 2019-06-12 PROCEDURE — 96413 CHEMO IV INFUSION 1 HR: CPT

## 2019-06-12 RX ADMIN — INFLIXIMAB 730 MG: 100 INJECTION, POWDER, LYOPHILIZED, FOR SOLUTION INTRAVENOUS at 09:58

## 2019-06-12 NOTE — NURSING NOTE
NURSING PROGRESS NOTE       Tolerated prescribed treatment without incident. Patient refused AVS, Pt verbalized understanding.  Discharged to home. Condition Satisfactory .  Caroline Cerda RN

## 2019-06-12 NOTE — NURSING NOTE
NURSING PROGRESS NOTE      0850  Pt to [facility] per scheduled appointment .Pt ID verified by (2) identifiers. Allergies, medications and medical history reviewed and verified Pt has taken pre-meds at home..0994 Medication started. Caroline Cerda RN

## 2019-08-06 ENCOUNTER — TELEPHONE (OUTPATIENT)
Dept: SURGERY | Facility: CLINIC | Age: 69
End: 2019-08-06

## 2019-08-06 RX ORDER — SODIUM CHLORIDE 9 MG/ML
250 INJECTION, SOLUTION INTRAVENOUS ONCE
Status: CANCELLED | OUTPATIENT
Start: 2019-08-07

## 2019-08-06 NOTE — TELEPHONE ENCOUNTER
PT IS GOING IN TOMORROW--Wednesday, 8/7/2019, FOR HER REMICADE INFUSION AND THERE IS NO THERAPY PLAN IN PLACE.  DON IN AMBULATORY CARE SAYS IT MUST BE IN CHART AND SIGNED BY YOU BEFORE PT'S INFUSION.

## 2019-08-07 ENCOUNTER — HOSPITAL ENCOUNTER (OUTPATIENT)
Dept: INFUSION THERAPY | Facility: HOSPITAL | Age: 69
Discharge: HOME | End: 2019-08-07
Admitting: INTERNAL MEDICINE

## 2019-08-07 VITALS
HEART RATE: 64 BPM | OXYGEN SATURATION: 97 % | TEMPERATURE: 98 F | DIASTOLIC BLOOD PRESSURE: 81 MMHG | HEIGHT: 62 IN | BODY MASS INDEX: 29.59 KG/M2 | SYSTOLIC BLOOD PRESSURE: 127 MMHG | RESPIRATION RATE: 16 BRPM | WEIGHT: 160.8 LBS

## 2019-08-07 DIAGNOSIS — K51.20 ULCERATIVE PROCTITIS WITHOUT COMPLICATION (HCC): Primary | ICD-10-CM

## 2019-08-07 DIAGNOSIS — K51.311 ULCERATIVE RECTOSIGMOIDITIS WITH RECTAL BLEEDING (HCC): ICD-10-CM

## 2019-08-07 PROCEDURE — 96413 CHEMO IV INFUSION 1 HR: CPT

## 2019-08-07 PROCEDURE — 25010000002 INFLIXIMAB PER 10 MG: Performed by: INTERNAL MEDICINE

## 2019-08-07 PROCEDURE — 96415 CHEMO IV INFUSION ADDL HR: CPT

## 2019-08-07 RX ADMIN — INFLIXIMAB 729 MG: 100 INJECTION, POWDER, LYOPHILIZED, FOR SOLUTION INTRAVENOUS at 09:02

## 2019-08-07 NOTE — NURSING NOTE
NURSING PROGRESS NOTE:    PATIENT ARRIVE TO [facility] FOR SCHEDULED INFUSION AT 0840 .  PROCEDURE WAS PERFORMED WITHOUT INCIDENT. AVS REVIEWED PRIOR TO DISCHARGE.  PATIENT DISCHARGED HOME AT 1130 . KAILYN LEE

## 2019-08-07 NOTE — PATIENT INSTRUCTIONS
"  Call Dr. Dl Saldaña, Gastroenterology at [phone] if you have any problems or concerns.    We know you have a Choice in healthcare and appreciate you using [facility].  Our purpose is to provide you \"Excellent Care\".  We hope that you will always choose us in the future and continue to recommend us to your family and friends.              "

## 2019-09-04 ENCOUNTER — TELEPHONE (OUTPATIENT)
Dept: INTERNAL MEDICINE | Facility: CLINIC | Age: 69
End: 2019-09-04

## 2019-09-04 NOTE — TELEPHONE ENCOUNTER
----- Message from Sunshine Evans sent at 9/4/2019 11:14 AM EDT -----  Contact: patient  Andrea pt    Patient called because she wanted dr silva to know that she was in [facility] for the last 3 days because she had a TIA. She saw a neurologist, and a cardiologist. She said that her arteries are clear. She is feeling fine now. The neurologist told her that she needs med for her choloesterol. She said that she cannot take lipitor or crestor, and dr silva knows this.   She has an appointment scheduled here for labs on 9-25, and for a visit with dr silva as a follow up on 10-2. Does she need to come in before that for a hospital follow up?    She would like for a medical assistant to call her back at [phone]

## 2019-09-04 NOTE — TELEPHONE ENCOUNTER
That's fine.  I'll review her hospital records and we can discuss other options for cholesterol at her visit.

## 2019-09-04 NOTE — TELEPHONE ENCOUNTER
Message from dr silva has been delivered to patient. She will keep on doing what she is doing, and discuss issues of cholesterol at her next visit as scheduled. She will call us if anything else pops up.

## 2019-09-09 RX ORDER — OMEPRAZOLE 20 MG/1
CAPSULE, DELAYED RELEASE ORAL
Qty: 90 CAPSULE | Refills: 0 | Status: SHIPPED | OUTPATIENT
Start: 2019-09-09 | End: 2019-12-10 | Stop reason: SDUPTHER

## 2019-09-25 ENCOUNTER — HOSPITAL ENCOUNTER (OUTPATIENT)
Dept: MAMMOGRAPHY | Facility: HOSPITAL | Age: 69
Discharge: HOME | End: 2019-09-25
Admitting: FAMILY MEDICINE

## 2019-09-25 ENCOUNTER — APPOINTMENT (OUTPATIENT)
Dept: BONE DENSITY | Facility: HOSPITAL | Age: 69
End: 2019-09-25

## 2019-09-25 DIAGNOSIS — M85.88 OSTEOPENIA OF SPINE: ICD-10-CM

## 2019-09-25 DIAGNOSIS — Z12.39 SCREENING BREAST EXAMINATION: ICD-10-CM

## 2019-09-25 LAB
ALBUMIN SERPL-MCNC: 3.9 G/DL (ref 3.5–5.2)
ALBUMIN/GLOB SERPL: 1 G/DL
ALP SERPL-CCNC: 71 U/L (ref 39–117)
ALT SERPL-CCNC: 12 U/L (ref 1–33)
AST SERPL-CCNC: 18 U/L (ref 1–32)
BASOPHILS # BLD AUTO: 0.02 10*3/MM3 (ref 0–0.2)
BASOPHILS NFR BLD AUTO: 0.4 % (ref 0–1.5)
BILIRUB SERPL-MCNC: 0.6 MG/DL (ref 0.2–1.2)
BUN SERPL-MCNC: 7 MG/DL (ref 8–23)
BUN/CREAT SERPL: 13.2 (ref 7–25)
CALCIUM SERPL-MCNC: 8.9 MG/DL (ref 8.6–10.5)
CHLORIDE SERPL-SCNC: 97 MMOL/L (ref 98–107)
CHOLEST SERPL-MCNC: 200 MG/DL (ref 0–200)
CHOLEST/HDLC SERPL: 3.7 {RATIO}
CO2 SERPL-SCNC: 21.6 MMOL/L (ref 22–29)
CREAT SERPL-MCNC: 0.53 MG/DL (ref 0.57–1)
EOSINOPHIL # BLD AUTO: 0.18 10*3/MM3 (ref 0–0.4)
EOSINOPHIL NFR BLD AUTO: 3.6 % (ref 0.3–6.2)
ERYTHROCYTE [DISTWIDTH] IN BLOOD BY AUTOMATED COUNT: 12.3 % (ref 12.3–15.4)
GLOBULIN SER CALC-MCNC: 3.9 GM/DL
GLUCOSE SERPL-MCNC: 93 MG/DL (ref 65–99)
HCT VFR BLD AUTO: 38.4 % (ref 34–46.6)
HDLC SERPL-MCNC: 54 MG/DL (ref 40–60)
HGB BLD-MCNC: 12.7 G/DL (ref 12–15.9)
IMM GRANULOCYTES # BLD AUTO: 0.01 10*3/MM3 (ref 0–0.05)
IMM GRANULOCYTES NFR BLD AUTO: 0.2 % (ref 0–0.5)
LDLC SERPL CALC-MCNC: 108 MG/DL (ref 0–100)
LYMPHOCYTES # BLD AUTO: 1.13 10*3/MM3 (ref 0.7–3.1)
LYMPHOCYTES NFR BLD AUTO: 22.6 % (ref 19.6–45.3)
MCH RBC QN AUTO: 31.4 PG (ref 26.6–33)
MCHC RBC AUTO-ENTMCNC: 33.1 G/DL (ref 31.5–35.7)
MCV RBC AUTO: 94.8 FL (ref 79–97)
MONOCYTES # BLD AUTO: 0.52 10*3/MM3 (ref 0.1–0.9)
MONOCYTES NFR BLD AUTO: 10.4 % (ref 5–12)
NEUTROPHILS # BLD AUTO: 3.15 10*3/MM3 (ref 1.7–7)
NEUTROPHILS NFR BLD AUTO: 62.8 % (ref 42.7–76)
NRBC BLD AUTO-RTO: 0 /100 WBC (ref 0–0.2)
PLATELET # BLD AUTO: 325 10*3/MM3 (ref 140–450)
POTASSIUM SERPL-SCNC: 4.3 MMOL/L (ref 3.5–5.2)
PROT SERPL-MCNC: 7.8 G/DL (ref 6–8.5)
RBC # BLD AUTO: 4.05 10*6/MM3 (ref 3.77–5.28)
SODIUM SERPL-SCNC: 133 MMOL/L (ref 136–145)
T4 FREE SERPL-MCNC: 1.43 NG/DL (ref 0.93–1.7)
TRIGL SERPL-MCNC: 189 MG/DL (ref 0–150)
TSH SERPL DL<=0.005 MIU/L-ACNC: 1.37 UIU/ML (ref 0.27–4.2)
VLDLC SERPL CALC-MCNC: 37.8 MG/DL
WBC # BLD AUTO: 5.01 10*3/MM3 (ref 3.4–10.8)

## 2019-09-25 PROCEDURE — 77067 SCR MAMMO BI INCL CAD: CPT

## 2019-09-25 PROCEDURE — 77080 DXA BONE DENSITY AXIAL: CPT

## 2019-09-25 PROCEDURE — 77063 BREAST TOMOSYNTHESIS BI: CPT

## 2019-09-26 ENCOUNTER — RESULTS ENCOUNTER (OUTPATIENT)
Dept: INTERNAL MEDICINE | Facility: CLINIC | Age: 69
End: 2019-09-26

## 2019-09-26 DIAGNOSIS — E03.9 HYPOTHYROIDISM, UNSPECIFIED TYPE: ICD-10-CM

## 2019-09-26 DIAGNOSIS — E78.5 HYPERLIPIDEMIA, UNSPECIFIED HYPERLIPIDEMIA TYPE: ICD-10-CM

## 2019-09-26 DIAGNOSIS — D75.89 MACROCYTOSIS WITHOUT ANEMIA: ICD-10-CM

## 2019-10-02 ENCOUNTER — OFFICE VISIT (OUTPATIENT)
Dept: INTERNAL MEDICINE | Facility: CLINIC | Age: 69
End: 2019-10-02

## 2019-10-02 ENCOUNTER — HOSPITAL ENCOUNTER (OUTPATIENT)
Dept: INFUSION THERAPY | Facility: HOSPITAL | Age: 69
Discharge: HOME | End: 2019-10-02
Admitting: INTERNAL MEDICINE

## 2019-10-02 VITALS
OXYGEN SATURATION: 96 % | RESPIRATION RATE: 16 BRPM | HEIGHT: 62 IN | BODY MASS INDEX: 29.44 KG/M2 | SYSTOLIC BLOOD PRESSURE: 130 MMHG | DIASTOLIC BLOOD PRESSURE: 74 MMHG | HEART RATE: 67 BPM | WEIGHT: 160 LBS | TEMPERATURE: 98.6 F

## 2019-10-02 VITALS
OXYGEN SATURATION: 96 % | HEART RATE: 77 BPM | WEIGHT: 163 LBS | BODY MASS INDEX: 30 KG/M2 | SYSTOLIC BLOOD PRESSURE: 120 MMHG | HEIGHT: 62 IN | DIASTOLIC BLOOD PRESSURE: 76 MMHG | TEMPERATURE: 98.1 F | RESPIRATION RATE: 16 BRPM

## 2019-10-02 DIAGNOSIS — E78.5 HYPERLIPIDEMIA, UNSPECIFIED HYPERLIPIDEMIA TYPE: ICD-10-CM

## 2019-10-02 DIAGNOSIS — M25.50 ARTHRALGIA, UNSPECIFIED JOINT: ICD-10-CM

## 2019-10-02 DIAGNOSIS — Z23 NEED FOR INFLUENZA VACCINATION: Primary | ICD-10-CM

## 2019-10-02 DIAGNOSIS — F32.A DEPRESSION, UNSPECIFIED DEPRESSION TYPE: ICD-10-CM

## 2019-10-02 DIAGNOSIS — M81.0 AGE-RELATED OSTEOPOROSIS WITHOUT CURRENT PATHOLOGICAL FRACTURE: ICD-10-CM

## 2019-10-02 DIAGNOSIS — D75.89 MACROCYTOSIS WITHOUT ANEMIA: ICD-10-CM

## 2019-10-02 DIAGNOSIS — I45.6 WOLFF-PARKINSON-WHITE (WPW) PATTERN: ICD-10-CM

## 2019-10-02 DIAGNOSIS — G45.9 TIA (TRANSIENT ISCHEMIC ATTACK): ICD-10-CM

## 2019-10-02 DIAGNOSIS — E03.9 HYPOTHYROIDISM, UNSPECIFIED TYPE: ICD-10-CM

## 2019-10-02 DIAGNOSIS — K51.20 ULCERATIVE PROCTITIS WITHOUT COMPLICATION (HCC): Primary | ICD-10-CM

## 2019-10-02 DIAGNOSIS — K51.311 ULCERATIVE RECTOSIGMOIDITIS WITH RECTAL BLEEDING (HCC): ICD-10-CM

## 2019-10-02 DIAGNOSIS — Z00.00 ROUTINE HEALTH MAINTENANCE: ICD-10-CM

## 2019-10-02 DIAGNOSIS — M81.0 AGE-RELATED OSTEOPOROSIS WITHOUT CURRENT PATHOLOGICAL FRACTURE: Primary | ICD-10-CM

## 2019-10-02 PROCEDURE — 99214 OFFICE O/P EST MOD 30 MIN: CPT | Performed by: FAMILY MEDICINE

## 2019-10-02 PROCEDURE — 96415 CHEMO IV INFUSION ADDL HR: CPT

## 2019-10-02 PROCEDURE — 96413 CHEMO IV INFUSION 1 HR: CPT

## 2019-10-02 PROCEDURE — G0008 ADMIN INFLUENZA VIRUS VAC: HCPCS | Performed by: FAMILY MEDICINE

## 2019-10-02 PROCEDURE — 25010000002 INFLIXIMAB PER 10 MG: Performed by: INTERNAL MEDICINE

## 2019-10-02 PROCEDURE — 90653 IIV ADJUVANT VACCINE IM: CPT | Performed by: FAMILY MEDICINE

## 2019-10-02 RX ORDER — ASPIRIN 81 MG/1
81 TABLET, CHEWABLE ORAL DAILY
COMMUNITY

## 2019-10-02 RX ADMIN — INFLIXIMAB 726 MG: 100 INJECTION, POWDER, LYOPHILIZED, FOR SOLUTION INTRAVENOUS at 10:46

## 2019-10-02 NOTE — NURSING NOTE
NURSING PROGRESS NOTE     Tolerated prescribed treatment without incident. Patient received AVS, Pt verbalized understanding.  Discharged to home per self with spouse @1340. Condition Satisfactory .  Caroline Cerda RN

## 2019-10-02 NOTE — NURSING NOTE
NURSING PROGRESS NOTE    1020 Pt to [facility] per ambulatory with spouse from MD office appointment. .Pt ID verified by (2) identifiers. Allergies, medications and medical history reviewed and verified.Pt had taken tylenol 1300 mg and Zyrtec 10 mg @ 0730 prior to office visit for her pre-meds for her treatment today. Caroline Cerda RN

## 2019-10-02 NOTE — PROGRESS NOTES
Subjective     Yelena Rangel is a 68 y.o. female, who presents with a chief complaint of   Chief Complaint   Patient presents with   • Depression   • Hypothyroidism   • Hyperlipidemia       Hyperlipidemia   Exacerbating diseases include hypothyroidism.   Hypothyroidism   Associated symptoms include arthralgias.   Depression     1. Hyperlipidemia.  Doesn't tolerate statins.  She has been exercising regularly.    2. Ulcerative colitis.  On Remicade every 2 months per Dr. Saldaña.  On mesalamine.  She is feeling well.    3. Depression.  Pt reports she is still doing well with venlafaxine.    4. TIA.  She was in [place] 8/31-9/2/19 after an episode of word finding difficulty associated with headache.  Resolved.  Workup negative.  Pt started on ASA 81 mg daily for presumed TIA.    5. Migratory arthralgias.  X 1 week.  Tends to occur week leading up to Remicade infusion.  Has infusion for today.  Wrists, 1st MCPs, ankles.  Moves around, sometimes associated with swelling.  Not worse in the morning.  No redness.    The following portions of the patient's history were reviewed and updated as appropriate: allergies, current medications, past family history, past medical history, past social history, past surgical history and problem list.    Allergies: Sulfa antibiotics    Review of Systems   Constitutional: Negative.    HENT: Negative.    Eyes: Negative.    Respiratory: Negative.    Cardiovascular: Negative.    Gastrointestinal: Negative.    Endocrine: Negative.    Genitourinary: Negative.    Musculoskeletal: Positive for arthralgias.   Skin: Negative.    Allergic/Immunologic: Positive for environmental allergies.   Neurological: Negative.    Hematological: Negative.    Psychiatric/Behavioral: Negative.        Objective     Wt Readings from Last 3 Encounters:   10/02/19 72.6 kg (160 lb)   10/02/19 73.9 kg (163 lb)   08/07/19 72.9 kg (160 lb 12.8 oz)     Temp Readings from Last 3 Encounters:   10/02/19 98.1 °F (36.7 °C)  (Oral)   08/07/19 98 °F (36.7 °C)   06/12/19 96 °F (35.6 °C)     BP Readings from Last 3 Encounters:   10/02/19 120/76   08/07/19 127/81   06/12/19 164/88     Pulse Readings from Last 3 Encounters:   10/02/19 77   08/07/19 64   06/12/19 68     Body mass index is 29.81 kg/m².  SpO2 Readings from Last 3 Encounters:   09/27/17 98%   03/29/17 98%   09/28/16 97%       Physical Exam   Constitutional: She is oriented to person, place, and time. She appears well-developed and well-nourished.   HENT:   Head: Normocephalic.   Mouth/Throat: Oropharynx is clear and moist.   Eyes: Conjunctivae and EOM are normal.   Neck: Neck supple. No thyromegaly present.   Cardiovascular: Normal rate, regular rhythm and normal heart sounds.   Pulmonary/Chest: Effort normal and breath sounds normal.   Abdominal: Soft. Bowel sounds are normal. There is no hepatosplenomegaly.   Musculoskeletal: She exhibits tenderness (left wrist, 1st MCP, with mild swelling).   Neurological: She is alert and oriented to person, place, and time.   Skin: Skin is warm and dry. No rash noted.   Psychiatric: She has a normal mood and affect. Her behavior is normal.   Nursing note and vitals reviewed.      Results for orders placed or performed in visit on 09/26/19   Comprehensive Metabolic Panel   Result Value Ref Range    Glucose 93 65 - 99 mg/dL    BUN 7 (L) 8 - 23 mg/dL    Creatinine 0.53 (L) 0.57 - 1.00 mg/dL    eGFR Non African Am 115 >60 mL/min/1.73    eGFR African Am 139 >60 mL/min/1.73    BUN/Creatinine Ratio 13.2 7.0 - 25.0    Sodium 133 (L) 136 - 145 mmol/L    Potassium 4.3 3.5 - 5.2 mmol/L    Chloride 97 (L) 98 - 107 mmol/L    Total CO2 21.6 (L) 22.0 - 29.0 mmol/L    Calcium 8.9 8.6 - 10.5 mg/dL    Total Protein 7.8 6.0 - 8.5 g/dL    Albumin 3.90 3.50 - 5.20 g/dL    Globulin 3.9 gm/dL    A/G Ratio 1.0 g/dL    Total Bilirubin 0.6 0.2 - 1.2 mg/dL    Alkaline Phosphatase 71 39 - 117 U/L    AST (SGOT) 18 1 - 32 U/L    ALT (SGPT) 12 1 - 33 U/L   TSH   Result  Value Ref Range    TSH 1.370 0.270 - 4.200 uIU/mL   T4, Free   Result Value Ref Range    Free T4 1.43 0.93 - 1.70 ng/dL   Lipid Panel With / Chol / HDL Ratio   Result Value Ref Range    Total Cholesterol 200 0 - 200 mg/dL    Triglycerides 189 (H) 0 - 150 mg/dL    HDL Cholesterol 54 40 - 60 mg/dL    VLDL Cholesterol 37.8 mg/dL    LDL Cholesterol  108 (H) 0 - 100 mg/dL    Chol/HDL Ratio 3.70    CBC & Differential   Result Value Ref Range    WBC 5.01 3.40 - 10.80 10*3/mm3    RBC 4.05 3.77 - 5.28 10*6/mm3    Hemoglobin 12.7 12.0 - 15.9 g/dL    Hematocrit 38.4 34.0 - 46.6 %    MCV 94.8 79.0 - 97.0 fL    MCH 31.4 26.6 - 33.0 pg    MCHC 33.1 31.5 - 35.7 g/dL    RDW 12.3 12.3 - 15.4 %    Platelets 325 140 - 450 10*3/mm3    Neutrophil Rel % 62.8 42.7 - 76.0 %    Lymphocyte Rel % 22.6 19.6 - 45.3 %    Monocyte Rel % 10.4 5.0 - 12.0 %    Eosinophil Rel % 3.6 0.3 - 6.2 %    Basophil Rel % 0.4 0.0 - 1.5 %    Neutrophils Absolute 3.15 1.70 - 7.00 10*3/mm3    Lymphocytes Absolute 1.13 0.70 - 3.10 10*3/mm3    Monocytes Absolute 0.52 0.10 - 0.90 10*3/mm3    Eosinophils Absolute 0.18 0.00 - 0.40 10*3/mm3    Basophils Absolute 0.02 0.00 - 0.20 10*3/mm3    Immature Granulocyte Rel % 0.2 0.0 - 0.5 %    Immature Grans Absolute 0.01 0.00 - 0.05 10*3/mm3    nRBC 0.0 0.0 - 0.2 /100 WBC       Assessment/Plan   Yelena was seen today for depression, hypothyroidism and hyperlipidemia.    Diagnoses and all orders for this visit:    Need for influenza vaccination  -     Fluad Quad >65 years (2354-0269)    Hyperlipidemia, unspecified hyperlipidemia type    Hypothyroidism, unspecified type    Depression, unspecified depression type    Age-related osteoporosis without current pathological fracture    Macrocytosis without anemia    Ulcerative rectosigmoiditis with rectal bleeding (CMS/HCC)    Liseth-Parkinson-White (WPW) pattern    Routine health maintenance    Arthralgia, unspecified joint  -     C-reactive Protein  -     Sedimentation Rate  -      Uric Acid  -     Rheumatoid Factor, Quant  -     MICK    TIA (transient ischemic attack)    1. Hyperlipidemia.  Off statin.  Continue lifestyle measures.     2. Hypothyroidism.  Adequately replaced.    3. Depression.  Controlled.  Continue venlafaxine and lifestyle measures.     4. Osteoporosis.  Recent dexa scan showed worsening, from osteopenia to osteoporosis in the hip.  Not a candidate for bisphosphonates d/t ulcerative colitis.  Options discussed.  Start Prolia.  Continue calcium+D and weight bearing exercise.  Recheck dexa scan in 2 years.    5. Macrocytosis. B12 and folate normal.  This resolved off Azasan.    6. Ulcerative colitis.  Well-controlled.  Continue per Dr. Saldaña.    7. Liseth-Parkinson-White.  She saw cardiology before her knee replacements.    8. Routine health maint.  Prevnar and Pneumovax UTD.  Mammogram UTD.  Flu vaccine today.    9. Arthralgias.  Check labs.    10. TIA.  Continue ASA 81 mg.  She does not tolerate statins.  Consider Zetia.        Outpatient Medications Prior to Visit   Medication Sig Dispense Refill   • Acetaminophen (TYLENOL ARTHRITIS PAIN PO) Take 1 tablet by mouth 3 (Three) Times a Day. AS NEEDED FOR ARTHRITIS     • aspirin 81 MG chewable tablet Chew 81 mg Daily.     • Calcium Carb-Cholecalciferol (CALCIUM PLUS VITAMIN D3 PO) Take 1,400 mg by mouth Daily. D3 = 1500 IU     • cetirizine (ZyrTEC) 10 MG tablet Take 10 mg by mouth daily.     • InFLIXimab (REMICADE IV) Infuse 1 dose into a venous catheter Every 30 (Thirty) Days. Every 60 days.  Dr. Saldaña.     • levothyroxine (SYNTHROID, LEVOTHROID) 100 MCG tablet TAKE ONE TABLET BY MOUTH DAILY 90 tablet 1   • mesalamine (APRISO) 0.375 g 24 hr capsule Take 4 capsules by mouth Daily. 360 capsule 3   • Multiple Vitamins-Minerals (MULTIVITAMIN WITH MINERALS) tablet tablet Take 1 tablet by mouth Daily.     • omeprazole (priLOSEC) 20 MG capsule TAKE ONE CAPSULE BY MOUTH DAILY 90 capsule 0   • Probiotic Product (TRUNATURE  DIGESTIVE PROBIOTIC PO) Take  by mouth Daily.     • venlafaxine XR (EFFEXOR-XR) 75 MG 24 hr capsule TAKE ONE CAPSULE BY MOUTH DAILY 90 capsule 1     Facility-Administered Medications Prior to Visit   Medication Dose Route Frequency Provider Last Rate Last Dose   • inFLIXimab (REMICADE) 10 mg/kg = 726 mg in sodium chloride 0.9 % 250 mL IVPB  10 mg/kg Intravenous Once Piotr, Dl Heath MD         No orders of the defined types were placed in this encounter.    [unfilled]  There are no discontinued medications.    Return in about 6 months (around 4/2/2020).

## 2019-10-02 NOTE — PATIENT INSTRUCTIONS
"  Call [facility] at [phone] if you have any problems or concerns.    We know you have a Choice in healthcare and appreciate you using [facility].  Our purpose is to provide you \"Excellent Care\".  We hope that you will always choose us in the future and continue to recommend us to your family and friends.      Infliximab infusion   What is this medicine?  INFLIXIMAB (in FLIX i mab) is used to treat Crohn's disease and ulcerative colitis. It is also used to treat ankylosing spondylitis, plaque psoriasis, and some forms of arthritis.  This medicine may be used for other purposes; ask your health care provider or pharmacist if you have questions.  COMMON BRAND NAME(S): INFLECTRA, Remicade, RENFLEXIS  What should I tell my health care provider before I take this medicine?  They need to know if you have any of these conditions:  -cancer  -current or past resident of [place]  -diabetes  -exposure to tuberculosis  -Guillain-[place] syndrome  -heart failure  -hepatitis or liver disease  -immune system problems  -infection  -lung or breathing disease, like COPD  -multiple sclerosis  -receiving phototherapy for the skin  -seizure disorder  -an unusual or allergic reaction to infliximab, mouse proteins, other medicines, foods, dyes, or preservatives  -pregnant or trying to get pregnant  -breast-feeding  How should I use this medicine?  This medicine is for injection into a vein. It is usually given by a health care professional in a hospital or clinic setting.  A special MedGuide will be given to you by the pharmacist with each prescription and refill. Be sure to read this information carefully each time.  Talk to your pediatrician regarding the use of this medicine in children. While this drug may be prescribed for children as young as 6 years of age for selected conditions, precautions do apply.  Overdosage: If you think you have taken too much of this " medicine contact a poison control center or emergency room at once.  NOTE: This medicine is only for you. Do not share this medicine with others.  What if I miss a dose?  It is important not to miss your dose. Call your doctor or health care professional if you are unable to keep an appointment.  What may interact with this medicine?  Do not take this medicine with any of the following medications:  -biologic medicines such as abatacept, adalimumab, anakinra, certolizumab, etanercept, golimumab, rituximab, secukinumab, tocilizumab, tofactinib, ustekinumab  -live vaccines  This list may not describe all possible interactions. Give your health care provider a list of all the medicines, herbs, non-prescription drugs, or dietary supplements you use. Also tell them if you smoke, drink alcohol, or use illegal drugs. Some items may interact with your medicine.  What should I watch for while using this medicine?  Your condition will be monitored carefully while you are receiving this medicine. Visit your doctor or health care professional for regular checks on your progress. You may need blood work done while you are taking this medicine. Before beginning therapy, your doctor may do a test to see if you have been exposed to tuberculosis.  Call your doctor or health care professional for advice if you get a fever, chills or sore throat, or other symptoms of a cold or flu. Do not treat yourself. This drug decreases your body's ability to fight infections. Try to avoid being around people who are sick.  This medicine may make the symptoms of heart failure worse in some patients. If you notice symptoms such as increased shortness of breath or swelling of the ankles or legs, contact your health care provider right away.  If you are going to have surgery or dental work, tell your health care professional or dentist that you have received this medicine.  If you take this medicine for plaque psoriasis, stay out of the sun. If you  cannot avoid being in the sun, wear protective clothing and use sunscreen. Do not use sun lamps or tanning beds/booths.  Talk to your doctor about your risk of cancer. You may be more at risk for certain types of cancers if you take this medicine.  What side effects may I notice from receiving this medicine?  Side effects that you should report to your doctor or health care professional as soon as possible:  -allergic reactions like skin rash, itching or hives, swelling of the face, lips, or tongue  -breathing problems  -changes in vision  -chest pain  -fever or chills, usually related to the infusion  -joint pain  -pain, tingling, numbness in the hands or feet  -redness, blistering, peeling or loosening of the skin, including inside the mouth  -seizures  -signs of infection - fever or chills, cough, sore throat, flu-like symptoms, pain or difficulty passing urine  -signs and symptoms of liver injury like dark yellow or brown urine; general ill feeling; light-colored stools; loss of appetite; nausea; right upper belly pain; unusually weak or tired; yellowing of the eyes or skin  -signs and symptoms of a stroke like changes in vision; confusion; trouble speaking or understanding; severe headaches; sudden numbness or weakness of the face, arm or leg; trouble walking; dizziness; loss of balance or coordination  -swelling of the ankles, feet, or hands  -swollen lymph nodes in the neck, underarm, or groin areas  -unusual bleeding or bruising  -unusually weak or tired  Side effects that usually do not require medical attention (report to your doctor or health care professional if they continue or are bothersome):  -headache  -nausea  -stomach pain  -upset stomach  This list may not describe all possible side effects. Call your doctor for medical advice about side effects. You may report side effects to FDA at 1-714-FDA-6079.  Where should I keep my medicine?  This drug is given in a hospital or clinic and will not be  stored at home.  NOTE: This sheet is a summary. It may not cover all possible information. If you have questions about this medicine, talk to your doctor, pharmacist, or health care provider.  © 2019 [employer] (2018-01-17 13:45:32)

## 2019-10-03 LAB
ANA SER QL: NEGATIVE
CRP SERPL-MCNC: 6.46 MG/DL (ref 0–0.5)
ERYTHROCYTE [SEDIMENTATION RATE] IN BLOOD BY WESTERGREN METHOD: 78 MM/HR (ref 0–30)
RHEUMATOID FACT SERPL-ACNC: 26.3 IU/ML (ref 0–13.9)
URATE SERPL-MCNC: 3.4 MG/DL (ref 2.4–5.7)

## 2019-10-05 LAB
CCP IGA+IGG SERPL IA-ACNC: 72 UNITS (ref 0–19)
Lab: NORMAL
WRITTEN AUTHORIZATION: NORMAL

## 2019-10-07 DIAGNOSIS — M05.79 RHEUMATOID ARTHRITIS INVOLVING MULTIPLE SITES WITH POSITIVE RHEUMATOID FACTOR (HCC): Primary | ICD-10-CM

## 2019-10-09 ENCOUNTER — TELEPHONE (OUTPATIENT)
Dept: INTERNAL MEDICINE | Facility: CLINIC | Age: 69
End: 2019-10-09

## 2019-10-09 NOTE — TELEPHONE ENCOUNTER
Advised patient as per below and suggested that she, herself, call her insurance to see if she can get an override to get coverage in [place] due to distance.  Patient voiced understanding and agreed.  She will call me back after checking.    ----- Message from Jen Mills sent at 10/8/2019  3:05 PM EDT -----  Contact: patient @ [phone]   Because no one takes her insurance  ----- Message -----  From: Kae Ramirez MA  Sent: 10/8/2019   2:24 PM  To: Jen Mills    Looks like order was put in for [facility].  Patient requesting [place] rheumatologist.  Do you know why [facility]?  ----- Message -----  From: Lucie Pacheco  Sent: 10/7/2019   1:14 PM  To: Nazia Zuniga [facility]    Patient requesting call back with results from last weeks testing.

## 2019-10-10 ENCOUNTER — OFFICE VISIT (OUTPATIENT)
Dept: GASTROENTEROLOGY | Facility: CLINIC | Age: 69
End: 2019-10-10

## 2019-10-10 ENCOUNTER — HOSPITAL ENCOUNTER (OUTPATIENT)
Dept: INFUSION THERAPY | Facility: HOSPITAL | Age: 69
Discharge: HOME | End: 2019-10-10
Admitting: FAMILY MEDICINE

## 2019-10-10 VITALS
BODY MASS INDEX: 30.07 KG/M2 | WEIGHT: 163.4 LBS | DIASTOLIC BLOOD PRESSURE: 84 MMHG | SYSTOLIC BLOOD PRESSURE: 128 MMHG | HEIGHT: 62 IN

## 2019-10-10 VITALS
RESPIRATION RATE: 16 BRPM | TEMPERATURE: 98.3 F | SYSTOLIC BLOOD PRESSURE: 134 MMHG | OXYGEN SATURATION: 97 % | HEART RATE: 79 BPM | DIASTOLIC BLOOD PRESSURE: 86 MMHG

## 2019-10-10 DIAGNOSIS — M81.0 OSTEOPOROSIS WITHOUT PATHOLOGICAL FRACTURE: Primary | ICD-10-CM

## 2019-10-10 DIAGNOSIS — M81.0 AGE-RELATED OSTEOPOROSIS WITHOUT CURRENT PATHOLOGICAL FRACTURE: ICD-10-CM

## 2019-10-10 DIAGNOSIS — I00 ACUTE RHEUMATIC ARTHRITIS: ICD-10-CM

## 2019-10-10 DIAGNOSIS — G45.9 TIA (TRANSIENT ISCHEMIC ATTACK): ICD-10-CM

## 2019-10-10 DIAGNOSIS — K51.011 ULCERATIVE PANCOLITIS WITH RECTAL BLEEDING (HCC): Primary | ICD-10-CM

## 2019-10-10 PROCEDURE — 99213 OFFICE O/P EST LOW 20 MIN: CPT | Performed by: INTERNAL MEDICINE

## 2019-10-10 PROCEDURE — 25010000002 DENOSUMAB 60 MG/ML SOLUTION PREFILLED SYRINGE

## 2019-10-10 PROCEDURE — 96372 THER/PROPH/DIAG INJ SC/IM: CPT

## 2019-10-10 RX ADMIN — DENOSUMAB 60 MG: 60 INJECTION SUBCUTANEOUS at 08:35

## 2019-10-10 NOTE — PATIENT INSTRUCTIONS
Call  Dr. Duncan, at [facility] at [phone] Denosumab injection  What is this medicine?  DENOSUMAB (den oh rosalio mab) slows bone breakdown. Prolia is used to treat osteoporosis in women after menopause and in men, and in people who are taking corticosteroids for 6 months or more. Xgeva is used to treat a high calcium level due to cancer and to prevent bone fractures and other bone problems caused by multiple myeloma or cancer bone metastases. Xgeva is also used to treat giant cell tumor of the bone.  This medicine may be used for other purposes; ask your health care provider or pharmacist if you have questions.  COMMON BRAND NAME(S): Prolia, XGEVA  What should I tell my health care provider before I take this medicine?  They need to know if you have any of these conditions:  -dental disease  -having surgery or tooth extraction  -infection  -kidney disease  -low levels of calcium or Vitamin D in the blood  -malnutrition  -on hemodialysis  -skin conditions or sensitivity  -thyroid or parathyroid disease  -an unusual reaction to denosumab, other medicines, foods, dyes, or preservatives  -pregnant or trying to get pregnant  -breast-feeding  How should I use this medicine?  This medicine is for injection under the skin. It is given by a health care professional in a hospital or clinic setting.  A special MedGuide will be given to you before each treatment. Be sure to read this information carefully each time.  For Prolia, talk to your pediatrician regarding the use of this medicine in children. Special care may be needed. For Xgeva, talk to your pediatrician regarding the use of this medicine in children. While this drug may be prescribed for children as young as 13 years for selected conditions, precautions do apply.  Overdosage: If you think you have taken too much of this medicine contact a poison control center or emergency room at once.  NOTE: This medicine is only for you. Do not  share this medicine with others.  What if I miss a dose?  It is important not to miss your dose. Call your doctor or health care professional if you are unable to keep an appointment.  What may interact with this medicine?  Do not take this medicine with any of the following medications:  -other medicines containing denosumab  This medicine may also interact with the following medications:  -medicines that lower your chance of fighting infection  -steroid medicines like prednisone or cortisone  This list may not describe all possible interactions. Give your health care provider a list of all the medicines, herbs, non-prescription drugs, or dietary supplements you use. Also tell them if you smoke, drink alcohol, or use illegal drugs. Some items may interact with your medicine.  What should I watch for while using this medicine?  Visit your doctor or health care professional for regular checks on your progress. Your doctor or health care professional may order blood tests and other tests to see how you are doing.  Call your doctor or health care professional for advice if you get a fever, chills or sore throat, or other symptoms of a cold or flu. Do not treat yourself. This drug may decrease your body's ability to fight infection. Try to avoid being around people who are sick.  You should make sure you get enough calcium and vitamin D while you are taking this medicine, unless your doctor tells you not to. Discuss the foods you eat and the vitamins you take with your health care professional.  See your dentist regularly. Brush and floss your teeth as directed. Before you have any dental work done, tell your dentist you are receiving this medicine.  Do not become pregnant while taking this medicine or for 5 months after stopping it. Talk with your doctor or health care professional about your birth control options while taking this medicine. Women should inform their doctor if they wish to become pregnant or think they  "might be pregnant. There is a potential for serious side effects to an unborn child. Talk to your health care professional or pharmacist for more information.  What side effects may I notice from receiving this medicine?  Side effects that you should report to your doctor or health care professional as soon as possible:  -allergic reactions like skin rash, itching or hives, swelling of the face, lips, or tongue  -bone pain  -breathing problems  -dizziness  -jaw pain, especially after dental work  -redness, blistering, peeling of the skin  -signs and symptoms of infection like fever or chills; cough; sore throat; pain or trouble passing urine  -signs of low calcium like fast heartbeat, muscle cramps or muscle pain; pain, tingling, numbness in the hands or feet; seizures  -unusual bleeding or bruising  -unusually weak or tired  Side effects that usually do not require medical attention (report to your doctor or health care professional if they continue or are bothersome):  -constipation  -diarrhea  -headache  -joint pain  -loss of appetite  -muscle pain  -runny nose  -tiredness  -upset stomach  This list may not describe all possible side effects. Call your doctor for medical advice about side effects. You may report side effects to FDA at 7-620-FDA-2173.  Where should I keep my medicine?  This medicine is only given in a clinic, doctor's office, or other health care setting and will not be stored at home.  NOTE: This sheet is a summary. It may not cover all possible information. If you have questions about this medicine, talk to your doctor, pharmacist, or health care provider.  © 2019 [employer]vier/Gold Standard (2019-04-26 16:10:44)   if you have any problems or concerns.    We know you have a Choice in healthcare and appreciate you using [facility].  Our purpose is to provide you \"Excellent Care\".  We hope that you will always choose us in the future and continue to recommend us to your family and " friends.

## 2019-10-10 NOTE — NURSING NOTE
NURSE PROGRESS NOTE    PT. ARRIVED @ [facility] FOR SCHEDULED INJECTION AT 0820 .  INJECTION WAS PERFORMED WITHOUT INCIDENT.  PT. DISCHARGED TO HOME AT 0845.AVS PRINTED & REVIEWED WITH PATIENT, PATIENT DISCHARGED TO ANOTHER APPOINTMENT IN THE HOSPITAL.

## 2019-10-10 NOTE — PROGRESS NOTES
PATIENT INFORMATION  Yelena Rangel       - 1950    CHIEF COMPLAINT  Chief Complaint   Patient presents with   • Follow-up     6 mo follow up on Ulcerative Proctitis without complication       HISTORY OF PRESENT ILLNESS  Labor day had TIA and is on daily 81 mg ASA     Has had joint issues and positive RA factor and was much better after her Remicade infusion.             REVIEW OF SYSTEMS  Review of Systems   All other systems reviewed and are negative.        ACTIVE PROBLEMS  Patient Active Problem List    Diagnosis   • TIA (transient ischemic attack) [G45.9]   • Leukopenia [D72.819]   • Depression [F32.9]   • Hyperlipidemia [E78.5]   • Hypothyroidism [E03.9]   • Age-related osteoporosis without current pathological fracture [M81.0]   • Liseth-Parkinson-White (WPW) pattern [I45.6]   • Ulcerative colitis (CMS/HCC) [K51.90]   • Allergic rhinitis [J30.9]   • Primary osteoarthritis of knees, bilateral [M17.0]         PAST MEDICAL HISTORY  Past Medical History:   Diagnosis Date   • Arthritis    • Arthritis    • Colon polyp    • Disease of thyroid gland    • GERD (gastroesophageal reflux disease)    • Hyperlipidemia    • Hypothyroidism    • Osteopenia    • Ulcerative (chronic) rectosigmoiditis with rectal bleeding (CMS/HCC)    • Ulcerative colitis (CMS/HCC)    • WPW (Liseth-Parkinson-White syndrome)          SURGICAL HISTORY  Past Surgical History:   Procedure Laterality Date   • COLONOSCOPY N/A 2016    Procedure: COLONOSCOPY with biopsies;  Surgeon: lD Saldaña MD;  Location: [facility] OR;  Service:    • COLONOSCOPY W/ POLYPECTOMY     • HYSTERECTOMY     • REPLACEMENT TOTAL KNEE Left 10/09/2018    [facility], Dr. Simon Barber, surgeon   • SIGMOIDOSCOPY N/A 2017    Procedure: SIGMOIDOSCOPY FLEXIBLE with biopsy;  Surgeon: Dl Saldaña MD;  Location: [facility] OR;  Service:    • THYROID LOBECTOMY Right    • TOTAL KNEE ARTHROPLASTY Right 2018         FAMILY  HISTORY  Family History   Problem Relation Age of Onset   • Breast cancer Mother    • Breast cancer Sister    • Colon cancer Father    • Colon cancer Brother          SOCIAL HISTORY  Social History     Occupational History   • Not on file   Tobacco Use   • Smoking status: Never Smoker   • Smokeless tobacco: Never Used   Substance and Sexual Activity   • Alcohol use: Yes     Comment: twice week   • Drug use: No   • Sexual activity: Defer       Debilities/Disabilities Identified: None    Emotional Behavior: Appropriate    CURRENT MEDICATIONS    Current Outpatient Medications:   •  Acetaminophen (TYLENOL ARTHRITIS PAIN PO), Take 1 tablet by mouth 3 (Three) Times a Day. AS NEEDED FOR ARTHRITIS, Disp: , Rfl:   •  aspirin 81 MG chewable tablet, Chew 81 mg Daily., Disp: , Rfl:   •  Calcium Carb-Cholecalciferol (CALCIUM PLUS VITAMIN D3 PO), Take 1,400 mg by mouth Daily. D3 = 1500 IU, Disp: , Rfl:   •  cetirizine (ZyrTEC) 10 MG tablet, Take 10 mg by mouth daily., Disp: , Rfl:   •  InFLIXimab (REMICADE IV), Infuse 1 dose into a venous catheter Every 30 (Thirty) Days. Every 60 days.  Dr. Saldaña., Disp: , Rfl:   •  levothyroxine (SYNTHROID, LEVOTHROID) 100 MCG tablet, TAKE ONE TABLET BY MOUTH DAILY, Disp: 90 tablet, Rfl: 1  •  mesalamine (APRISO) 0.375 g 24 hr capsule, Take 4 capsules by mouth Daily., Disp: 360 capsule, Rfl: 3  •  Multiple Vitamins-Minerals (MULTIVITAMIN WITH MINERALS) tablet tablet, Take 1 tablet by mouth Daily., Disp: , Rfl:   •  omeprazole (priLOSEC) 20 MG capsule, TAKE ONE CAPSULE BY MOUTH DAILY, Disp: 90 capsule, Rfl: 0  •  Probiotic Product (TRUNATURE DIGESTIVE PROBIOTIC PO), Take  by mouth Daily., Disp: , Rfl:   •  venlafaxine XR (EFFEXOR-XR) 75 MG 24 hr capsule, TAKE ONE CAPSULE BY MOUTH DAILY, Disp: 90 capsule, Rfl: 1  No current facility-administered medications for this visit.     ALLERGIES  Sulfa antibiotics    VITALS  Vitals:    10/10/19 0914   BP: 128/84   Weight: 74.1 kg (163 lb 6.4 oz)  "  Height: 157.5 cm (62.01\")       LAST RESULTS   Office Visit on 10/02/2019   Component Date Value Ref Range Status   • C-Reactive Protein 10/02/2019 6.46* 0.00 - 0.50 mg/dL Final   • Sed Rate 10/02/2019 78* 0 - 30 mm/hr Final   • Uric Acid 10/02/2019 3.4  2.4 - 5.7 mg/dL Final   • RA Latex Turbid 10/02/2019 26.3* 0.0 - 13.9 IU/mL Final   • MICK Direct 10/02/2019 Negative  Negative Final   • CCP Antibodies IgG/IgA 10/02/2019 72* 0 - 19 units Final    Comment:                           Negative               <20                            Weak positive      20 - 39                            Moderate positive  40 - 59                            Strong positive        >59     • Please note 10/02/2019 Comment   Final    Comment: We have received your request for additional testing or test  verification.  You will be notified if we are unable to process  your request.     • Written Authorization 10/02/2019 Comment   Final    Comment: Written Authorization Received.  Authorization received from WRITTEN REQUEST 10-  Logged by Lucie Chung       Dexa Bone Density Axial    Result Date: 9/25/2019  Narrative: DXA BONE MINERAL DENSITY MEASUREMENT, 09/25/2019  INDICATION: 68-year-old  female. Postmenopausal at age 46. Hysterectomy. Takes thyroid medication, multivitamin, vitamin D and calcium supplements. History of osteopenia.  TECHNIQUE: DXA bone mineral density measurements were obtained at the lumbar spine and left hip.  FINDINGS: At the lumbar spine, L1-L4 T score measures -1.7. This indicates osteopenia (T score between -1.0 and -2.5). Bone mineral density has increased 4.5 % since the previous study of 11/07/2016.  At the left hip, lowest left femoral neck T score measures -2.7. This indicates osteoporosis (T score below -2.5). Bone mineral density has decreased 9.5 % since the previous study of 11/07/2016.      Impression: 1. Osteoporosis. 2. Femoral neck T score measures -2.7  This report was " finalized on 9/25/2019 9:04 AM by Dr. Clint Hadley MD.      Mammo Screening Digital Tomosynthesis Bilateral With Cad    Result Date: 9/25/2019  Narrative: EXAM, 09/25/2019: 1. Bilateral digital screening mammogram with CAD. 2. Bilateral digital screening breast tomosynthesis.  INDICATION: Routine screening  TECHNIQUE: Bilateral digital screening mammogram images were obtained and reviewed with CAD. Digital breast tomosynthesis images were also reviewed.  COMPARISON: *  Screening mammogram, 09/19/2018  FINDINGS: There are scattered areas of fibroglandular density. No significant change when compared with prior images. No mammographic evidence of malignancy. Recommend repeat screening mammogram in one year.      Impression: Negative mammogram  BI-RADS CATEGORY 1: Negative   Women over the age of 40 undergoing screening mammography are entered into a reminder system with target due date for the next mammogram.  This report was finalized on 9/25/2019 10:00 AM by Dr. Clint Hadley MD.        PHYSICAL EXAM  Physical Exam   Constitutional: She is oriented to person, place, and time. She appears well-developed and well-nourished.   HENT:   Head: Normocephalic and atraumatic.   Eyes: Conjunctivae and EOM are normal. Pupils are equal, round, and reactive to light. No scleral icterus.   Neck: Normal range of motion. Neck supple. No thyromegaly present.   Cardiovascular: Normal rate, regular rhythm, normal heart sounds and intact distal pulses. Exam reveals no gallop.   No murmur heard.  Pulmonary/Chest: Effort normal and breath sounds normal. She has no wheezes. She has no rales.   Abdominal: Soft. Bowel sounds are normal. She exhibits no shifting dullness, no distension, no fluid wave, no abdominal bruit, no ascites and no mass. There is no hepatosplenomegaly. There is no tenderness. There is no guarding and negative Sorto's sign. Hernia confirmed negative in the ventral area.   Musculoskeletal: Normal range of motion. She  exhibits no edema.   Lymphadenopathy:     She has no cervical adenopathy.   Neurological: She is alert and oriented to person, place, and time.   Skin: Skin is warm and dry. No rash noted. She is not diaphoretic. No erythema.   Psychiatric: She has a normal mood and affect. Her behavior is normal.       ASSESSMENT  Diagnoses and all orders for this visit:    Ulcerative pancolitis with rectal bleeding (CMS/HCC)  -     Infliximab (IFX) Conc+ IFX Ab; Future    TIA (transient ischemic attack)    Acute rheumatic arthritis          PLAN  Will check an ANSER for levels and antibodies.  Will await Rheums opinion on MTX etc but can use 10mg/kg of her remicade to see if that holds her Arthritis symptoms better  Return in about 4 months (around 2/10/2020).

## 2019-11-20 ENCOUNTER — LAB (OUTPATIENT)
Dept: LAB | Facility: HOSPITAL | Age: 69
End: 2019-11-20

## 2019-11-20 DIAGNOSIS — K51.011 ULCERATIVE PANCOLITIS WITH RECTAL BLEEDING (HCC): ICD-10-CM

## 2019-11-20 DIAGNOSIS — I00 ACUTE RHEUMATIC ARTHRITIS: ICD-10-CM

## 2019-11-20 DIAGNOSIS — G45.9 TIA (TRANSIENT ISCHEMIC ATTACK): ICD-10-CM

## 2019-11-20 PROCEDURE — 82397 CHEMILUMINESCENT ASSAY: CPT

## 2019-11-20 PROCEDURE — 80299 QUANTITATIVE ASSAY DRUG: CPT

## 2019-11-20 PROCEDURE — 36415 COLL VENOUS BLD VENIPUNCTURE: CPT

## 2019-11-20 PROCEDURE — 83520 IMMUNOASSAY QUANT NOS NONAB: CPT

## 2019-11-26 LAB
INFLIXIMAB AB SERPL-MCNC: 612 NG/ML
INFLIXIMAB SERPL-MCNC: 1.2 UG/ML

## 2019-11-27 ENCOUNTER — HOSPITAL ENCOUNTER (OUTPATIENT)
Dept: INFUSION THERAPY | Facility: HOSPITAL | Age: 69
Discharge: HOME | End: 2019-11-27
Admitting: INTERNAL MEDICINE

## 2019-11-27 VITALS
SYSTOLIC BLOOD PRESSURE: 144 MMHG | BODY MASS INDEX: 29.77 KG/M2 | OXYGEN SATURATION: 97 % | HEART RATE: 63 BPM | HEIGHT: 62 IN | TEMPERATURE: 98.7 F | RESPIRATION RATE: 16 BRPM | DIASTOLIC BLOOD PRESSURE: 91 MMHG | WEIGHT: 161.8 LBS

## 2019-11-27 DIAGNOSIS — K51.20 ULCERATIVE PROCTITIS WITHOUT COMPLICATION (HCC): Primary | ICD-10-CM

## 2019-11-27 DIAGNOSIS — K51.311 ULCERATIVE RECTOSIGMOIDITIS WITH RECTAL BLEEDING (HCC): ICD-10-CM

## 2019-11-27 PROCEDURE — 96415 CHEMO IV INFUSION ADDL HR: CPT

## 2019-11-27 PROCEDURE — 25010000002 INFLIXIMAB PER 10 MG: Performed by: INTERNAL MEDICINE

## 2019-11-27 PROCEDURE — 96413 CHEMO IV INFUSION 1 HR: CPT

## 2019-11-27 RX ADMIN — INFLIXIMAB 734 MG: 100 INJECTION, POWDER, LYOPHILIZED, FOR SOLUTION INTRAVENOUS at 08:51

## 2019-11-29 DIAGNOSIS — E03.9 HYPOTHYROIDISM, UNSPECIFIED TYPE: ICD-10-CM

## 2019-12-02 ENCOUNTER — OFFICE VISIT (OUTPATIENT)
Dept: INTERNAL MEDICINE | Facility: CLINIC | Age: 69
End: 2019-12-02

## 2019-12-02 VITALS
TEMPERATURE: 97.9 F | OXYGEN SATURATION: 96 % | BODY MASS INDEX: 29.81 KG/M2 | WEIGHT: 162 LBS | SYSTOLIC BLOOD PRESSURE: 136 MMHG | RESPIRATION RATE: 16 BRPM | DIASTOLIC BLOOD PRESSURE: 82 MMHG | HEART RATE: 76 BPM | HEIGHT: 62 IN

## 2019-12-02 DIAGNOSIS — Z00.00 MEDICARE ANNUAL WELLNESS VISIT, SUBSEQUENT: Primary | ICD-10-CM

## 2019-12-02 PROCEDURE — 96160 PT-FOCUSED HLTH RISK ASSMT: CPT | Performed by: FAMILY MEDICINE

## 2019-12-02 PROCEDURE — G0439 PPPS, SUBSEQ VISIT: HCPCS | Performed by: FAMILY MEDICINE

## 2019-12-02 RX ORDER — LEVOTHYROXINE SODIUM 0.1 MG/1
TABLET ORAL
Qty: 90 TABLET | Refills: 0 | Status: SHIPPED | OUTPATIENT
Start: 2019-12-02 | End: 2020-03-09

## 2019-12-02 RX ORDER — VENLAFAXINE HYDROCHLORIDE 75 MG/1
CAPSULE, EXTENDED RELEASE ORAL
Qty: 90 CAPSULE | Refills: 0 | Status: SHIPPED | OUTPATIENT
Start: 2019-12-02 | End: 2020-02-25

## 2019-12-02 NOTE — PATIENT INSTRUCTIONS
Advance Directive    Advance directives are legal documents that let you make choices ahead of time about your health care and medical treatment in case you become unable to communicate for yourself. Advance directives are a way for you to communicate your wishes to family, friends, and health care providers. This can help convey your decisions about end-of-life care if you become unable to communicate.  Discussing and writing advance directives should happen over time rather than all at once. Advance directives can be changed depending on your situation and what you want, even after you have signed the advance directives.  If you do not have an advance directive, some states assign family decision makers to act on your behalf based on how closely you are related to them. Each state has its own laws regarding advance directives. You may want to check with your health care provider, , or state representative about the laws in your state. There are different types of advance directives, such as:  · Medical power of .  · Living will.  · Do not resuscitate (DNR) or do not attempt resuscitation (DNAR) order.  Health care proxy and medical power of   A health care proxy, also called a health care agent, is a person who is appointed to make medical decisions for you in cases in which you are unable to make the decisions yourself. Generally, people choose someone they know well and trust to represent their preferences. Make sure to ask this person for an agreement to act as your proxy. A proxy may have to exercise judgment in the event of a medical decision for which your wishes are not known.  A medical power of  is a legal document that names your health care proxy. Depending on the laws in your state, after the document is written, it may also need to be:  · Signed.  · Notarized.  · Dated.  · Copied.  · Witnessed.  · Incorporated into your medical record.  You may also want to appoint  someone to manage your financial affairs in a situation in which you are unable to do so. This is called a durable power of  for finances. It is a separate legal document from the durable power of  for health care. You may choose the same person or someone different from your health care proxy to act as your agent in financial matters.  If you do not appoint a proxy, or if there is a concern that the proxy is not acting in your best interests, a court-appointed guardian may be designated to act on your behalf.  Living will  A living will is a set of instructions documenting your wishes about medical care when you cannot express them yourself. Health care providers should keep a copy of your living will in your medical record. You may want to give a copy to family members or friends. To alert caregivers in case of an emergency, you can place a card in your wallet to let them know that you have a living will and where they can find it. A living will is used if you become:  · Terminally ill.  · Incapacitated.  · Unable to communicate or make decisions.  Items to consider in your living will include:  · The use or non-use of life-sustaining equipment, such as dialysis machines and breathing machines (ventilators).  · A DNR or DNAR order, which is the instruction not to use cardiopulmonary resuscitation (CPR) if breathing or heartbeat stops.  · The use or non-use of tube feeding.  · Withholding of food and fluids.  · Comfort (palliative) care when the goal becomes comfort rather than a cure.  · Organ and tissue donation.  A living will does not give instructions for distributing your money and property if you should pass away. It is recommended that you seek the advice of a  when writing a will. Decisions about taxes, beneficiaries, and asset distribution will be legally binding. This process can relieve your family and friends of any concerns surrounding disputes or questions that may come up about  the distribution of your assets.  DNR or DNAR  A DNR or DNAR order is a request not to have CPR in the event that your heart stops beating or you stop breathing. If a DNR or DNAR order has not been made and shared, a health care provider will try to help any patient whose heart has stopped or who has stopped breathing. If you plan to have surgery, talk with your health care provider about how your DNR or DNAR order will be followed if problems occur.  Summary  · Advance directives are the legal documents that allow you to make choices ahead of time about your health care and medical treatment in case you become unable to communicate for yourself.  · The process of discussing and writing advance directives should happen over time. You can change the advance directives, even after you have signed them.  · Advance directives include DNR or DNAR orders, living loaiza, and designating an agent as your medical power of .  This information is not intended to replace advice given to you by your health care provider. Make sure you discuss any questions you have with your health care provider.  Document Released: 03/26/2009 Document Revised: 11/06/2017 Document Reviewed: 11/06/2017  [employer] Interactive Patient Education © 2019 [employer]

## 2019-12-02 NOTE — PROGRESS NOTES
The ABCs of the Annual Wellness Visit  Subsequent Medicare Wellness Visit    Chief Complaint   Patient presents with   • Medicare Wellness-subsequent     UTD w/cardiology/Mammo/Dexa 9/25/19       Subjective   History of Present Illness:  Yelena Rangel is a 69 y.o. female who presents for a Subsequent Medicare Wellness Visit.    HEALTH RISK ASSESSMENT    Recent Hospitalizations:  No hospitalization(s) within the last year.    Current Medical Providers:  Patient Care Team:  Juliano Mendez MD as PCP - General (Family Medicine)  Piotr, Dl Heath MD as Consulting Physician (Gastroenterology)  Simon Barber MD as Surgeon (Orthopedic Surgery)  Simon Barber MD as Surgeon (Orthopedic Surgery)    Smoking Status:  Social History     Tobacco Use   Smoking Status Never Smoker   Smokeless Tobacco Never Used       Alcohol Consumption:  Social History     Substance and Sexual Activity   Alcohol Use Yes    Comment: twice week       Depression Screen:   PHQ-2/PHQ-9 Depression Screening 12/2/2019   Little interest or pleasure in doing things 0   Feeling down, depressed, or hopeless 0   Trouble falling or staying asleep, or sleeping too much -   Feeling tired or having little energy -   Poor appetite or overeating -   Feeling bad about yourself - or that you are a failure or have let yourself or your family down -   Trouble concentrating on things, such as reading the newspaper or watching television -   Moving or speaking so slowly that other people could have noticed. Or the opposite - being so fidgety or restless that you have been moving around a lot more than usual -   Thoughts that you would be better off dead, or of hurting yourself in some way -   Total Score 0   If you checked off any problems, how difficult have these problems made it for you to do your work, take care of things at home, or get along with other people? -       Fall Risk Screen:  STEADI Fall Risk Assessment was completed, and  patient is at LOW risk for falls.Assessment completed on:10/2/2019    Health Habits and Functional and Cognitive Screening:  Functional & Cognitive Status 12/2/2019   Do you have difficulty preparing food and eating? No   Do you have difficulty bathing yourself, getting dressed or grooming yourself? No   Do you have difficulty using the toilet? No   Do you have difficulty moving around from place to place? No   Do you have trouble with steps or getting out of a bed or a chair? No   Current Diet Well Balanced Diet   Dental Exam Not up to date   Eye Exam Not up to date   Exercise (times per week) 2 times per week   Current Exercise Activities Include Stationary Bicycling/Spin Class   Do you need help using the phone?  No   Are you deaf or do you have serious difficulty hearing?  No   Do you need help with transportation? No   Do you need help shopping? No   Do you need help preparing meals?  No   Do you need help with housework?  No   Do you need help with laundry? No   Do you need help taking your medications? No   Do you need help managing money? No   Do you ever drive or ride in a car without wearing a seat belt? No   Have you felt unusual stress, anger or loneliness in the last month? No   Who do you live with? Spouse   If you need help, do you have trouble finding someone available to you? No   Have you been bothered in the last four weeks by sexual problems? No   Do you have difficulty concentrating, remembering or making decisions? No         Does the patient have evidence of cognitive impairment? No    Asprin use counseling:Taking ASA appropriately as indicated    Age-appropriate Screening Schedule:  Refer to the list below for future screening recommendations based on patient's age, sex and/or medical conditions. Orders for these recommended tests are listed in the plan section. The patient has been provided with a written plan.    Health Maintenance   Topic Date Due   • TDAP/TD VACCINES (1 - Tdap) 12/02/2019  (Originally 10/15/1969)   • ZOSTER VACCINE (1 of 2) 10/10/2020 (Originally 10/15/2001)   • LIPID PANEL  09/25/2020   • MAMMOGRAM  09/25/2021   • DXA SCAN  09/25/2021   • COLONOSCOPY  05/13/2026   • INFLUENZA VACCINE  Completed   • PNEUMOCOCCAL VACCINES (65+ LOW/MEDIUM RISK)  Completed          The following portions of the patient's history were reviewed and updated as appropriate: allergies, current medications, past family history, past medical history, past social history, past surgical history and problem list.    Outpatient Medications Prior to Visit   Medication Sig Dispense Refill   • Acetaminophen (TYLENOL ARTHRITIS PAIN PO) Take 1 tablet by mouth 3 (Three) Times a Day. AS NEEDED FOR ARTHRITIS     • aspirin 81 MG chewable tablet Chew 81 mg Daily.     • Calcium Carb-Cholecalciferol (CALCIUM PLUS VITAMIN D3 PO) Take 1,400 mg by mouth Daily. D3 = 1500 IU     • cetirizine (ZyrTEC) 10 MG tablet Take 10 mg by mouth daily.     • InFLIXimab (REMICADE IV) Infuse 1 dose into a venous catheter Every 30 (Thirty) Days. Every 60 days.  Dr. Saldaña.     • levothyroxine (SYNTHROID, LEVOTHROID) 100 MCG tablet TAKE ONE TABLET BY MOUTH DAILY 90 tablet 0   • Multiple Vitamins-Minerals (MULTIVITAMIN WITH MINERALS) tablet tablet Take 1 tablet by mouth Daily.     • omeprazole (priLOSEC) 20 MG capsule TAKE ONE CAPSULE BY MOUTH DAILY 90 capsule 0   • Probiotic Product (TRUNATURE DIGESTIVE PROBIOTIC PO) Take  by mouth Daily.     • venlafaxine XR (EFFEXOR-XR) 75 MG 24 hr capsule TAKE ONE CAPSULE BY MOUTH DAILY 90 capsule 0   • mesalamine (APRISO) 0.375 g 24 hr capsule Take 4 capsules by mouth Daily. 360 capsule 3     No facility-administered medications prior to visit.        Patient Active Problem List   Diagnosis   • Primary osteoarthritis of knees, bilateral   • Depression   • Hyperlipidemia   • Hypothyroidism   • Age-related osteoporosis without current pathological fracture   • Liseth-Parkinson-White (WPW) pattern   •  "Ulcerative colitis (CMS/HCC)   • Allergic rhinitis   • Leukopenia   • TIA (transient ischemic attack)       Advanced Care Planning:  Patient does not have an advance directive - information provided to the patient today    Review of Systems   Constitutional: Negative.    HENT: Negative.    Eyes: Negative.    Respiratory: Negative.    Cardiovascular: Negative.    Gastrointestinal: Negative.    Endocrine: Negative.    Genitourinary: Negative.    Musculoskeletal: Positive for arthralgias.   Skin: Negative.    Allergic/Immunologic: Positive for environmental allergies.   Neurological: Negative.    Hematological: Negative.    Psychiatric/Behavioral: Negative.        Compared to one year ago, the patient feels her physical health is the same.  Compared to one year ago, the patient feels her mental health is the same.    Reviewed chart for potential of high risk medication in the elderly: no  Reviewed chart for potential of harmful drug interactions in the elderly:no    Objective         Vitals:    12/02/19 0941   BP: 136/82   BP Location: Left arm   Patient Position: Sitting   Cuff Size: Adult   Pulse: 76   Resp: 16   Temp: 97.9 °F (36.6 °C)   TempSrc: Oral   SpO2: 96%   Weight: 73.5 kg (162 lb)   Height: 157.5 cm (62\")       Body mass index is 29.63 kg/m².  Discussed the patient's BMI with her. The BMI is above average; BMI management plan is completed.    Physical Exam   Constitutional: She is oriented to person, place, and time. She appears well-developed and well-nourished.   HENT:   Head: Normocephalic.   Mouth/Throat: Oropharynx is clear and moist.   Eyes: Conjunctivae and EOM are normal. Pupils are equal, round, and reactive to light.   Neck: Normal range of motion. Neck supple. No thyromegaly present.   Cardiovascular: Normal rate, regular rhythm and normal heart sounds.   Pulmonary/Chest: Effort normal and breath sounds normal.   Abdominal: Soft. Bowel sounds are normal. There is no hepatosplenomegaly. "   Musculoskeletal: Normal range of motion. She exhibits no edema.   Lymphadenopathy:     She has no cervical adenopathy.   Neurological: She is alert and oriented to person, place, and time.   Skin: Skin is warm and dry. No rash noted.   Psychiatric: She has a normal mood and affect. Her behavior is normal.   Nursing note and vitals reviewed.      Lab Results   Component Value Date    GLU 93 09/25/2019    CHLPL 200 09/25/2019    TRIG 189 (H) 09/25/2019    HDL 54 09/25/2019     (H) 09/25/2019    VLDL 37.8 09/25/2019        Assessment/Plan   Medicare Risks and Personalized Health Plan  CMS Preventative Services Quick Reference  Immunizations Discussed/Encouraged (specific immunizations; adacel Tdap and Shingrix ) Shingrix at pharmacy.  Tdap declined.  Flu vaccine and pneumococcal vaccines UTD.    The above risks/problems have been discussed with the patient.  Pertinent information has been shared with the patient in the After Visit Summary.  Follow up plans and orders are seen below in the Assessment/Plan Section.    Diagnoses and all orders for this visit:    1. Medicare annual wellness visit, subsequent (Primary)      Follow Up:  Return in about 4 months (around 4/2/2020).     An After Visit Summary and PPPS were given to the patient.

## 2019-12-05 PROBLEM — M05.79 RHEUMATOID ARTHRITIS INVOLVING MULTIPLE SITES WITH POSITIVE RHEUMATOID FACTOR: Status: ACTIVE | Noted: 2019-12-05

## 2019-12-10 RX ORDER — OMEPRAZOLE 20 MG/1
CAPSULE, DELAYED RELEASE ORAL
Qty: 90 CAPSULE | Refills: 0 | Status: SHIPPED | OUTPATIENT
Start: 2019-12-10 | End: 2020-03-09

## 2019-12-13 ENCOUNTER — TELEPHONE (OUTPATIENT)
Dept: SURGERY | Facility: CLINIC | Age: 69
End: 2019-12-13

## 2019-12-16 NOTE — TELEPHONE ENCOUNTER
TALK TO PT - WILL BE IN [place] TIL February - WAS NOT SURE IF YOU WANTED EVERY 6 WEEKS INSTEAD OF EVERY 8 WEEKS. PT STATES THAT SHE IS DOING GREAT. THANKS

## 2020-01-20 ENCOUNTER — TELEPHONE (OUTPATIENT)
Dept: INTERNAL MEDICINE | Facility: CLINIC | Age: 70
End: 2020-01-20

## 2020-01-20 NOTE — TELEPHONE ENCOUNTER
She can't take NSAIDs because of her gastrointestinal disease, so she can take Tylenol Arthritis.  I did review the rheumatologist's note and she does have rheumatoid arthritis.  They were hoping that the Remicade would take care of her symptoms, but if it's not they were thinking about adding some additional rheumatoid arthritis medication.

## 2020-01-20 NOTE — TELEPHONE ENCOUNTER
Patient is out of town and having quite a bit of joint pain.  Asking if pcp can recommend something for her to take for same.

## 2020-02-05 ENCOUNTER — HOSPITAL ENCOUNTER (OUTPATIENT)
Dept: INFUSION THERAPY | Facility: HOSPITAL | Age: 70
Discharge: HOME | End: 2020-02-05
Admitting: INTERNAL MEDICINE

## 2020-02-05 VITALS
RESPIRATION RATE: 16 BRPM | WEIGHT: 166.2 LBS | HEART RATE: 74 BPM | BODY MASS INDEX: 30.4 KG/M2 | SYSTOLIC BLOOD PRESSURE: 140 MMHG | TEMPERATURE: 97.3 F | OXYGEN SATURATION: 94 % | DIASTOLIC BLOOD PRESSURE: 98 MMHG

## 2020-02-05 DIAGNOSIS — K51.311 ULCERATIVE RECTOSIGMOIDITIS WITH RECTAL BLEEDING (HCC): ICD-10-CM

## 2020-02-05 DIAGNOSIS — K51.20 ULCERATIVE PROCTITIS WITHOUT COMPLICATION (HCC): Primary | ICD-10-CM

## 2020-02-05 PROCEDURE — 96365 THER/PROPH/DIAG IV INF INIT: CPT

## 2020-02-05 PROCEDURE — 96366 THER/PROPH/DIAG IV INF ADDON: CPT

## 2020-02-05 PROCEDURE — 96413 CHEMO IV INFUSION 1 HR: CPT

## 2020-02-05 PROCEDURE — 96415 CHEMO IV INFUSION ADDL HR: CPT

## 2020-02-05 PROCEDURE — 25010000002 INFLIXIMAB PER 10 MG: Performed by: INTERNAL MEDICINE

## 2020-02-05 RX ADMIN — INFLIXIMAB 750 MG: 100 INJECTION, POWDER, LYOPHILIZED, FOR SOLUTION INTRAVENOUS at 09:10

## 2020-02-05 NOTE — NURSING NOTE
Patient arrived with  to [facility] at 0820.  Medication administered as ordered without incident.  No questions per patient AVS denied.  Patient discharged to home with  at 1215.

## 2020-02-25 RX ORDER — VENLAFAXINE HYDROCHLORIDE 75 MG/1
CAPSULE, EXTENDED RELEASE ORAL
Qty: 90 CAPSULE | Refills: 0 | Status: SHIPPED | OUTPATIENT
Start: 2020-02-25 | End: 2020-06-01

## 2020-03-09 DIAGNOSIS — E03.9 HYPOTHYROIDISM, UNSPECIFIED TYPE: ICD-10-CM

## 2020-03-09 RX ORDER — OMEPRAZOLE 20 MG/1
CAPSULE, DELAYED RELEASE ORAL
Qty: 90 CAPSULE | Refills: 0 | Status: SHIPPED | OUTPATIENT
Start: 2020-03-09 | End: 2020-06-08

## 2020-03-09 RX ORDER — LEVOTHYROXINE SODIUM 0.1 MG/1
TABLET ORAL
Qty: 90 TABLET | Refills: 0 | Status: SHIPPED | OUTPATIENT
Start: 2020-03-09 | End: 2020-06-08

## 2020-03-10 ENCOUNTER — APPOINTMENT (OUTPATIENT)
Dept: MRI IMAGING | Facility: HOSPITAL | Age: 70
End: 2020-03-10

## 2020-03-10 ENCOUNTER — APPOINTMENT (OUTPATIENT)
Dept: CT IMAGING | Facility: HOSPITAL | Age: 70
End: 2020-03-10

## 2020-03-10 ENCOUNTER — APPOINTMENT (OUTPATIENT)
Dept: GENERAL RADIOLOGY | Facility: HOSPITAL | Age: 70
End: 2020-03-10

## 2020-03-10 ENCOUNTER — HOSPITAL ENCOUNTER (EMERGENCY)
Facility: HOSPITAL | Age: 70
Discharge: HOME | End: 2020-03-10
Attending: EMERGENCY MEDICINE | Admitting: EMERGENCY MEDICINE

## 2020-03-10 VITALS
HEIGHT: 62 IN | HEART RATE: 70 BPM | DIASTOLIC BLOOD PRESSURE: 83 MMHG | WEIGHT: 165 LBS | RESPIRATION RATE: 16 BRPM | TEMPERATURE: 97.6 F | SYSTOLIC BLOOD PRESSURE: 130 MMHG | BODY MASS INDEX: 30.36 KG/M2 | OXYGEN SATURATION: 96 %

## 2020-03-10 DIAGNOSIS — G43.909 MIGRAINE WITHOUT STATUS MIGRAINOSUS, NOT INTRACTABLE, UNSPECIFIED MIGRAINE TYPE: Primary | ICD-10-CM

## 2020-03-10 DIAGNOSIS — E87.1 HYPONATREMIA: ICD-10-CM

## 2020-03-10 LAB
ALBUMIN SERPL-MCNC: 3.9 G/DL (ref 3.5–5.2)
ALBUMIN/GLOB SERPL: 0.9 G/DL
ALP SERPL-CCNC: 53 U/L (ref 39–117)
ALT SERPL W P-5'-P-CCNC: 14 U/L (ref 1–33)
ANION GAP SERPL CALCULATED.3IONS-SCNC: 12.5 MMOL/L (ref 5–15)
APTT PPP: 30.2 SECONDS (ref 24.3–38.1)
AST SERPL-CCNC: 23 U/L (ref 1–32)
BASOPHILS # BLD AUTO: 0.01 10*3/MM3 (ref 0–0.2)
BASOPHILS NFR BLD AUTO: 0.1 % (ref 0–1.5)
BILIRUB SERPL-MCNC: 0.4 MG/DL (ref 0.2–1.2)
BUN BLD-MCNC: 7 MG/DL (ref 8–23)
BUN/CREAT SERPL: 13.2 (ref 7–25)
CALCIUM SPEC-SCNC: 9.1 MG/DL (ref 8.6–10.5)
CHLORIDE SERPL-SCNC: 95 MMOL/L (ref 98–107)
CO2 SERPL-SCNC: 19.5 MMOL/L (ref 22–29)
CREAT BLD-MCNC: 0.53 MG/DL (ref 0.57–1)
DEPRECATED RDW RBC AUTO: 43.9 FL (ref 37–54)
EOSINOPHIL # BLD AUTO: 0.29 10*3/MM3 (ref 0–0.4)
EOSINOPHIL NFR BLD AUTO: 4 % (ref 0.3–6.2)
ERYTHROCYTE [DISTWIDTH] IN BLOOD BY AUTOMATED COUNT: 11.9 % (ref 12.3–15.4)
GFR SERPL CREATININE-BSD FRML MDRD: 115 ML/MIN/1.73
GLOBULIN UR ELPH-MCNC: 4.4 GM/DL
GLUCOSE BLD-MCNC: 96 MG/DL (ref 65–99)
GLUCOSE BLDC GLUCOMTR-MCNC: 107 MG/DL (ref 70–130)
HCT VFR BLD AUTO: 40.5 % (ref 34–46.6)
HGB BLD-MCNC: 13.1 G/DL (ref 12–15.9)
HOLD SPECIMEN: NORMAL
HOLD SPECIMEN: NORMAL
IMM GRANULOCYTES # BLD AUTO: 0.01 10*3/MM3 (ref 0–0.05)
IMM GRANULOCYTES NFR BLD AUTO: 0.1 % (ref 0–0.5)
INR PPP: 1.08 (ref 0.9–1.1)
LYMPHOCYTES # BLD AUTO: 1.62 10*3/MM3 (ref 0.7–3.1)
LYMPHOCYTES NFR BLD AUTO: 22.1 % (ref 19.6–45.3)
MCH RBC QN AUTO: 31.6 PG (ref 26.6–33)
MCHC RBC AUTO-ENTMCNC: 32.3 G/DL (ref 31.5–35.7)
MCV RBC AUTO: 97.6 FL (ref 79–97)
MONOCYTES # BLD AUTO: 0.45 10*3/MM3 (ref 0.1–0.9)
MONOCYTES NFR BLD AUTO: 6.1 % (ref 5–12)
NEUTROPHILS # BLD AUTO: 4.96 10*3/MM3 (ref 1.7–7)
NEUTROPHILS NFR BLD AUTO: 67.6 % (ref 42.7–76)
PLATELET # BLD AUTO: 310 10*3/MM3 (ref 140–450)
PMV BLD AUTO: 10.3 FL (ref 6–12)
POTASSIUM BLD-SCNC: 4 MMOL/L (ref 3.5–5.2)
PROT SERPL-MCNC: 8.3 G/DL (ref 6–8.5)
PROTHROMBIN TIME: 13.7 SECONDS (ref 12.1–15)
RBC # BLD AUTO: 4.15 10*6/MM3 (ref 3.77–5.28)
SODIUM BLD-SCNC: 127 MMOL/L (ref 136–145)
TROPONIN T SERPL-MCNC: <0.01 NG/ML (ref 0–0.03)
WBC NRBC COR # BLD: 7.34 10*3/MM3 (ref 3.4–10.8)
WHOLE BLOOD HOLD SPECIMEN: NORMAL
WHOLE BLOOD HOLD SPECIMEN: NORMAL

## 2020-03-10 PROCEDURE — 70450 CT HEAD/BRAIN W/O DYE: CPT

## 2020-03-10 PROCEDURE — 25010000002 DIPHENHYDRAMINE PER 50 MG: Performed by: PHYSICIAN ASSISTANT

## 2020-03-10 PROCEDURE — 93005 ELECTROCARDIOGRAM TRACING: CPT | Performed by: PHYSICIAN ASSISTANT

## 2020-03-10 PROCEDURE — 85730 THROMBOPLASTIN TIME PARTIAL: CPT | Performed by: PHYSICIAN ASSISTANT

## 2020-03-10 PROCEDURE — 70551 MRI BRAIN STEM W/O DYE: CPT

## 2020-03-10 PROCEDURE — 96374 THER/PROPH/DIAG INJ IV PUSH: CPT

## 2020-03-10 PROCEDURE — 85610 PROTHROMBIN TIME: CPT | Performed by: PHYSICIAN ASSISTANT

## 2020-03-10 PROCEDURE — 93010 ELECTROCARDIOGRAM REPORT: CPT | Performed by: INTERNAL MEDICINE

## 2020-03-10 PROCEDURE — 99284 EMERGENCY DEPT VISIT MOD MDM: CPT | Performed by: PHYSICIAN ASSISTANT

## 2020-03-10 PROCEDURE — 85025 COMPLETE CBC W/AUTO DIFF WBC: CPT | Performed by: PHYSICIAN ASSISTANT

## 2020-03-10 PROCEDURE — 82962 GLUCOSE BLOOD TEST: CPT

## 2020-03-10 PROCEDURE — 99285 EMERGENCY DEPT VISIT HI MDM: CPT

## 2020-03-10 PROCEDURE — 96375 TX/PRO/DX INJ NEW DRUG ADDON: CPT

## 2020-03-10 PROCEDURE — 25010000002 PROCHLORPERAZINE 10 MG/2ML SOLUTION: Performed by: PHYSICIAN ASSISTANT

## 2020-03-10 PROCEDURE — 84484 ASSAY OF TROPONIN QUANT: CPT | Performed by: PHYSICIAN ASSISTANT

## 2020-03-10 PROCEDURE — 71045 X-RAY EXAM CHEST 1 VIEW: CPT

## 2020-03-10 PROCEDURE — 80053 COMPREHEN METABOLIC PANEL: CPT | Performed by: PHYSICIAN ASSISTANT

## 2020-03-10 RX ORDER — PROCHLORPERAZINE EDISYLATE 5 MG/ML
5 INJECTION INTRAMUSCULAR; INTRAVENOUS ONCE
Status: COMPLETED | OUTPATIENT
Start: 2020-03-10 | End: 2020-03-10

## 2020-03-10 RX ORDER — SODIUM CHLORIDE 0.9 % (FLUSH) 0.9 %
10 SYRINGE (ML) INJECTION AS NEEDED
Status: DISCONTINUED | OUTPATIENT
Start: 2020-03-10 | End: 2020-03-10 | Stop reason: HOSPADM

## 2020-03-10 RX ORDER — DIPHENHYDRAMINE HYDROCHLORIDE 50 MG/ML
12.5 INJECTION INTRAMUSCULAR; INTRAVENOUS ONCE
Status: COMPLETED | OUTPATIENT
Start: 2020-03-10 | End: 2020-03-10

## 2020-03-10 RX ORDER — AMITRIPTYLINE HYDROCHLORIDE 10 MG/1
10 TABLET, FILM COATED ORAL NIGHTLY
Qty: 30 TABLET | Refills: 0 | Status: SHIPPED | OUTPATIENT
Start: 2020-03-10 | End: 2020-04-01

## 2020-03-10 RX ADMIN — SODIUM CHLORIDE 1000 ML: 9 INJECTION, SOLUTION INTRAVENOUS at 14:30

## 2020-03-10 RX ADMIN — DIPHENHYDRAMINE HYDROCHLORIDE 12.5 MG: 50 INJECTION, SOLUTION INTRAMUSCULAR; INTRAVENOUS at 14:31

## 2020-03-10 RX ADMIN — PROCHLORPERAZINE EDISYLATE 5 MG: 5 INJECTION INTRAMUSCULAR; INTRAVENOUS at 14:30

## 2020-03-10 NOTE — ED PROVIDER NOTES
EMERGENCY DEPARTMENT ENCOUNTER      Room Number: 5/05    History is provided by the patient, no translation services needed    HPI:    Chief complaint: Expressive aphasia, headache    Location: Left-sided headache    Quality/Severity: 6/10, throbbing    Timing/Duration: Headache began at 0930, and expressive aphasia began at 10 AM    Modifying Factors: None.    Associated Symptoms: Positive for speech difficulty and headache.    Narrative: Pt is a 69 y.o. female who presents complaining of expressive aphasia and headache behind her left eye.  She states the headache behind her left eye began at 9:30 AM this morning, and expressive aphasia began at 10 AM.  She states she does have a history of TIA in August last year, she had a full work-up at [facility] that was negative for CVA.  Her  who is at bedside states that she has had difficulty finding her words this morning, he denies any slurred speech.  Patient denies any blurred vision or visual disturbance.  Patient denies any weakness or numbness, she denies any gait abnormality.      PMD: Juliano Mendez MD    REVIEW OF SYSTEMS  Review of Systems   Constitutional: Negative for chills and fever.   Eyes: Negative for photophobia and visual disturbance.   Respiratory: Negative for cough and shortness of breath.    Cardiovascular: Negative for chest pain and palpitations.   Gastrointestinal: Negative for abdominal pain, nausea and vomiting.   Genitourinary: Negative for difficulty urinating and dysuria.   Musculoskeletal: Negative for gait problem and neck stiffness.   Skin: Negative for pallor and rash.   Neurological: Positive for speech difficulty and headaches. Negative for dizziness, syncope, facial asymmetry, weakness and numbness.   Psychiatric/Behavioral: Negative for agitation and confusion. The patient is not nervous/anxious.          PAST MEDICAL HISTORY  Active Ambulatory Problems     Diagnosis Date Noted   • Primary osteoarthritis of  knees, bilateral 09/01/2016   • Depression 09/28/2016   • Hyperlipidemia 09/28/2016   • Hypothyroidism 09/28/2016   • Age-related osteoporosis without current pathological fracture 09/28/2016   • Liseth-Parkinson-White (WPW) pattern 09/28/2016   • Ulcerative colitis (CMS/HCC) 09/28/2016   • Allergic rhinitis 09/28/2016   • Leukopenia 09/27/2018   • TIA (transient ischemic attack) 10/02/2019   • Rheumatoid arthritis involving multiple sites with positive rheumatoid factor (CMS/HCC) 12/05/2019     Resolved Ambulatory Problems     Diagnosis Date Noted   • Macrocytosis without anemia 03/29/2017     Past Medical History:   Diagnosis Date   • Arthritis    • Arthritis    • Colon polyp    • Disease of thyroid gland    • GERD (gastroesophageal reflux disease)    • Osteopenia    • Rheumatoid arthritis (CMS/HCC)    • 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   • SIGMOIDOSCOPY N/A 12/13/2017    Procedure: SIGMOIDOSCOPY FLEXIBLE with biopsy;  Surgeon: Dl Saldaña MD;  Location: [facility] OR;  Service:    • THYROID LOBECTOMY Right    • TOTAL KNEE ARTHROPLASTY Right 11/20/2018       FAMILY HISTORY  Family History   Problem Relation Age of Onset   • Breast cancer Mother    • Breast cancer Sister    • Colon cancer Father    • Colon cancer Brother        SOCIAL HISTORY  Social History     Socioeconomic History   • Marital status:      Spouse name: Not on file   • Number of children: Not on file   • Years of education: Not on file   • Highest education level: Not on file   Tobacco Use    • Smoking status: Never Smoker   • Smokeless tobacco: Never Used   Substance and Sexual Activity   • Alcohol use: Yes     Comment: twice week   • Drug use: No   • Sexual activity: Defer       ALLERGIES  Sulfa antibiotics      Current Facility-Administered Medications:   •  sodium chloride 0.9 % flush 10 mL, 10 mL, Intravenous, PRN, Joleen Flores PA-C    Current Outpatient Medications:   •  Acetaminophen (TYLENOL ARTHRITIS PAIN PO), Take 1 tablet by mouth 3 (Three) Times a Day. AS NEEDED FOR ARTHRITIS, Disp: , Rfl:   •  aspirin 81 MG chewable tablet, Chew 81 mg Daily., Disp: , Rfl:   •  Calcium Carb-Cholecalciferol (CALCIUM PLUS VITAMIN D3 PO), Take 1,400 mg by mouth Daily. D3 = 1500 IU, Disp: , Rfl:   •  cetirizine (ZyrTEC) 10 MG tablet, Take 10 mg by mouth daily., Disp: , Rfl:   •  InFLIXimab (REMICADE IV), Infuse 1 dose into a venous catheter Every 30 (Thirty) Days. Every 60 days.  Dr. Saldaña., Disp: , Rfl:   •  levothyroxine (SYNTHROID, LEVOTHROID) 100 MCG tablet, TAKE ONE TABLET BY MOUTH DAILY, Disp: 90 tablet, Rfl: 0  •  mesalamine (APRISO) 0.375 g 24 hr capsule, Take 4 capsules by mouth Daily., Disp: 360 capsule, Rfl: 3  •  Multiple Vitamins-Minerals (MULTIVITAMIN WITH MINERALS) tablet tablet, Take 1 tablet by mouth Daily., Disp: , Rfl:   •  omeprazole (priLOSEC) 20 MG capsule, TAKE ONE CAPSULE BY MOUTH DAILY, Disp: 90 capsule, Rfl: 0  •  Probiotic Product (TRUNATURE DIGESTIVE PROBIOTIC PO), Take  by mouth Daily., Disp: , Rfl:   •  venlafaxine XR (EFFEXOR-XR) 75 MG 24 hr capsule, TAKE ONE CAPSULE BY MOUTH DAILY, Disp: 90 capsule, Rfl: 0  •  amitriptyline (ELAVIL) 10 MG tablet, Take 1 tablet by mouth Every Night for 30 days., Disp: 30 tablet, Rfl: 0    PHYSICAL EXAM  ED Triage Vitals [03/10/20 1304]   Temp Heart Rate Resp BP SpO2   97.6 °F (36.4 °C) 67 16 (!) 182/100 98 %      Temp src Heart Rate Source Patient Position BP Location FiO2 (%)   Oral Monitor -- -- --       Physical Exam    Constitutional: She is oriented to person, place, and time and well-developed, well-nourished, and in no distress.   HENT:   Head: Normocephalic and atraumatic.   Mouth/Throat: Oropharynx is clear and moist.   Eyes: Pupils are equal, round, and reactive to light. EOM are normal.   Neck: Normal range of motion. Neck supple.   Cardiovascular: Normal rate, regular rhythm and intact distal pulses.   Pulmonary/Chest: Effort normal and breath sounds normal.   Abdominal: Soft. Bowel sounds are normal. There is no tenderness. There is no guarding.   Musculoskeletal: Normal range of motion. She exhibits no edema or deformity.   Neurological: She is alert and oriented to person, place, and time. She has normal sensation, normal strength and intact cranial nerves. She displays abnormal speech (Mild expressive aphasia). She displays no weakness, no tremor and facial symmetry. She has a normal Finger-Nose-Finger Test and a normal Heel to Cross Test. She shows no pronator drift. GCS score is 15.   Skin: Skin is warm and dry.   Psychiatric: Mood, memory, affect and judgment normal.         LAB RESULTS  Results for orders placed or performed during the hospital encounter of 03/10/20   Comprehensive Metabolic Panel   Result Value Ref Range    Glucose 96 65 - 99 mg/dL    BUN 7 (L) 8 - 23 mg/dL    Creatinine 0.53 (L) 0.57 - 1.00 mg/dL    Sodium 127 (L) 136 - 145 mmol/L    Potassium 4.0 3.5 - 5.2 mmol/L    Chloride 95 (L) 98 - 107 mmol/L    CO2 19.5 (L) 22.0 - 29.0 mmol/L    Calcium 9.1 8.6 - 10.5 mg/dL    Total Protein 8.3 6.0 - 8.5 g/dL    Albumin 3.90 3.50 - 5.20 g/dL    ALT (SGPT) 14 1 - 33 U/L    AST (SGOT) 23 1 - 32 U/L    Alkaline Phosphatase 53 39 - 117 U/L    Total Bilirubin 0.4 0.2 - 1.2 mg/dL    eGFR Non African Amer 115 >60 mL/min/1.73    Globulin 4.4 gm/dL    A/G Ratio 0.9 g/dL    BUN/Creatinine Ratio 13.2 7.0 - 25.0    Anion Gap 12.5 5.0 - 15.0 mmol/L   Protime-INR   Result Value Ref Range    Protime 13.7 12.1 - 15.0  Seconds    INR 1.08 0.90 - 1.10   aPTT   Result Value Ref Range    PTT 30.2 24.3 - 38.1 seconds   Troponin   Result Value Ref Range    Troponin T <0.010 0.000 - 0.030 ng/mL   CBC Auto Differential   Result Value Ref Range    WBC 7.34 3.40 - 10.80 10*3/mm3    RBC 4.15 3.77 - 5.28 10*6/mm3    Hemoglobin 13.1 12.0 - 15.9 g/dL    Hematocrit 40.5 34.0 - 46.6 %    MCV 97.6 (H) 79.0 - 97.0 fL    MCH 31.6 26.6 - 33.0 pg    MCHC 32.3 31.5 - 35.7 g/dL    RDW 11.9 (L) 12.3 - 15.4 %    RDW-SD 43.9 37.0 - 54.0 fl    MPV 10.3 6.0 - 12.0 fL    Platelets 310 140 - 450 10*3/mm3    Neutrophil % 67.6 42.7 - 76.0 %    Lymphocyte % 22.1 19.6 - 45.3 %    Monocyte % 6.1 5.0 - 12.0 %    Eosinophil % 4.0 0.3 - 6.2 %    Basophil % 0.1 0.0 - 1.5 %    Immature Grans % 0.1 0.0 - 0.5 %    Neutrophils, Absolute 4.96 1.70 - 7.00 10*3/mm3    Lymphocytes, Absolute 1.62 0.70 - 3.10 10*3/mm3    Monocytes, Absolute 0.45 0.10 - 0.90 10*3/mm3    Eosinophils, Absolute 0.29 0.00 - 0.40 10*3/mm3    Basophils, Absolute 0.01 0.00 - 0.20 10*3/mm3    Immature Grans, Absolute 0.01 0.00 - 0.05 10*3/mm3   POC Glucose Once   Result Value Ref Range    Glucose 107 70 - 130 mg/dL   Light Blue Top   Result Value Ref Range    Extra Tube hold for add-on    Green Top (Gel)   Result Value Ref Range    Extra Tube Hold for add-ons.    Lavender Top   Result Value Ref Range    Extra Tube hold for add-on    Gold Top - SST   Result Value Ref Range    Extra Tube Hold for add-ons.          I ordered the above labs and reviewed the results    RADIOLOGY  Mri Brain Without Contrast    Result Date: 3/10/2020  Narrative: MRI Brain WO INDICATION: Left-sided headache with dizziness and word finding difficulties beginning this morning TECHNIQUE: MRI of the brain without IV contrast. COMPARISON:  None available. FINDINGS: Diffusion-weighted images are normal with no evidence of recent infarct. The routine brain images show moderate atrophy and moderate chronic ischemic changes in  periventricular white matter bilaterally. There is no evidence of mass lesion, hemorrhage or edema. The temporal lobes are symmetric. The extra-axial structures are unremarkable. Major dural venous sinuses are patent.     Impression: Atrophy with chronic senescent changes. No evidence of recent infarct. Signer Name: Naveen Rosas MD  Signed: 3/10/2020 3:34 PM  Workstation Name: [id number]  [facility]    Xr Chest 1 View    Result Date: 3/10/2020  Narrative: CHEST ONE VIEW 03/10/2020  HISTORY: Acute onset of generalized weakness, frontal headache and slurred speech beginning at 10:00 AM today.  FINDINGS: The heart is normal in size. The lungs are clear. There are no pleural effusions.      Impression: No active pulmonary disease.  This report was finalized on 3/10/2020 1:35 PM by Dr. Clint Hadley MD.      Ct Head Without Contrast Stroke Protocol    Result Date: 3/10/2020  Narrative: HEAD CT WITHOUT CONTRAST 03/10/2020  HISTORY: Frontal headache with slurred speech beginning at 10:00 AM today. No known trauma.  TECHNIQUE: Multiple axial images were obtained from the skull base to vertex without intravenous contrast administration. Radiation dose reduction techniques included automated exposure control or exposure modulation based on body size. Radiation audit for CT and nuclear cardiology exams in the last 12 months: 0.  FINDINGS: The ventricles are normal in size shape and position. There is no midline shift. There is minimal periventricular microvascular white matter ischemic change. There is no mass or mass effect, hemorrhage or acute infarct. Visualized paranasal sinuses are clear.      Impression: No acute intracranial abnormality.  This report was finalized on 3/10/2020 1:31 PM by Dr. Clint Hadley MD.        I ordered the above radiologic testing and reviewed the results    PROCEDURES  Procedures      PROGRESS AND CONSULTS  ED Course as of Mar 10 1711   Tue Mar 10, 2020   1315 Patient has  had expressive aphasia this morning starting at 10 AM.  Left-sided headache onset 9:30 AM.  Code stroke initiated after initial assessment.     [KS]   1403 EKG         EKG time / Interpretation time: 1329/1335  Rhythm/Rate: Sinus rhythm, 68   IL: 178  QRS, axis: -80  QTc 464  ST and T waves: There is no acute ST elevation or depression, there are T wave inversions in lead III.  EKG Tracing Interpreted Contemporaneously by me, independently viewed by me and MD.  No prior EKG with which to compare.      [KS]   1410 CONSULT  Time 1410  Discussed case with Dr Wagner, neurology.  Reviewed history, exam, results and treatments.  Discussed concerns and plan of care. Dr Wagner recommends brain MRI without contrast to rule out CVA, he states we may also give patient some Benadryl and Compazine to see if this alleviates her pain, he states he is more suspicious for migraine at this time.  He states if her MRI is negative, she may be discharged home and instructed to follow-up with primary care and neurology.  He recommends giving her 10 mg of Elavil nightly if MRI is negative.      [KS]   1555 Patient initially arrived with an NIH of 2, NIH is now a 0.  Labs and imaging have been discussed with patient and her family at bedside after receiving permission to discuss results with her family members.  Her MRI is negative for any CVA, her sodium is low at 127, she does appear to run chronically low in the lower 130s. She has been administered 1 L of normal saline here.  She states her headache has improved after Compazine and Benadryl.  Her blood pressure is also improved to 130/83.  I discussed with patient that our neurologist has recommended outpatient follow-up given her negative MRI, I discussed that her symptoms are likely caused by migraine headaches, Dr. Wagner has recommended Elavil for her, she is currently on Effexor, so I have discussed that she should hold this prescription until she sees a neurologist for further  instruction.  I have instructed her to follow-up with her PCP as well as [facility].  She does not have a neurologist that she currently sees after her initial TIA last year.  I have instructed patient to return to the emergency department should she have any worsening signs or symptoms.  Patient is agreeable with plan and verbalizes understanding.    [KS]      ED Course User Index  [KS] Joleen Flores, PEARL           MEDICAL DECISION MAKING  Results were reviewed/discussed with the patient and they were also made aware of online access. Pt also made aware that some labs, such as cultures, will not be resulted during ER visit and follow up with PMD is necessary.     MDM      NIHSS (NIH Stroke Scale/Score) reviewed and/or performed as part of the patient evaluation and treatment planning process.  The result associated with this review/performance is: 2  Initial NIH 2, repeat NIH 0.    DIAGNOSIS  Final diagnoses:   Migraine without status migrainosus, not intractable, unspecified migraine type   Hyponatremia       Latest Documented Vital Signs:  As of 17:11  BP- 130/83 HR- 70 Temp- 97.6 °F (36.4 °C) (Oral) O2 sat- 96%    DISPOSITION  Patient discharged home with instructions to follow-up with PCP and neurology.    Discussed pertinent labs and imaging findings with the patient/family.  Patient/Family voiced understanding of need to follow-up for recheck, further testing as needed.  Return to the emergency Department warnings were given.         Medication List      New Prescriptions    amitriptyline 10 MG tablet  Commonly known as:  ELAVIL  Take 1 tablet by mouth Every Night for 30 days.              Follow-up Information     Juliano Mendez MD. Call in 1 day.    Specialty:  Family Medicine  Why:  To schedule follow-up appointment  Contact information:  [address]  [place] [address] [phone]             [facility]. Call in 1 day.     Specialty:  Neurology  Why:  To schedule follow-up appointment  Contact information:  Tessie Sosa [address] [place] [id number] [phone]  Additional information:  Phone Number: [phone]      [facility] and across the street from the Cancer Center. Located in the [facility] with the number [id number] on the building.                   Dictated utilizing Dragon dictation       Joleen Flores PA-C  03/10/20 2653

## 2020-03-11 ENCOUNTER — TELEPHONE (OUTPATIENT)
Dept: INTERNAL MEDICINE | Facility: CLINIC | Age: 70
End: 2020-03-11

## 2020-03-11 ENCOUNTER — PATIENT OUTREACH (OUTPATIENT)
Dept: CASE MANAGEMENT | Facility: OTHER | Age: 70
End: 2020-03-11

## 2020-03-11 ENCOUNTER — EPISODE CHANGES (OUTPATIENT)
Dept: CASE MANAGEMENT | Facility: OTHER | Age: 70
End: 2020-03-11

## 2020-03-11 DIAGNOSIS — G45.9 TIA (TRANSIENT ISCHEMIC ATTACK): Primary | ICD-10-CM

## 2020-03-11 DIAGNOSIS — G43.909 MIGRAINE WITHOUT STATUS MIGRAINOSUS, NOT INTRACTABLE, UNSPECIFIED MIGRAINE TYPE: ICD-10-CM

## 2020-03-11 NOTE — OUTREACH NOTE
Care Plan Note      Responses   Annual Wellness Visit:   Patient Has Completed   Care Gaps Addressed  Colon Cancer Screening, Flu Shot, Mammogram   Colon Cancer Screening Type  Colonoscopy   Colonoscopy Status  Up to Date (< 10 yrs)   Colon Cancer Screening Completion at [facility] or Other  [facility]   Flu Shot Status  Up to Date   Mammogram Status  Up to Date   Mammogram Completion at [facility] or Other  [facility]   Other Patient Education/Resources   24/7 [facility] Nurse Call Line, Advanced Care Planning, MyChart   24/7 Nurse Call Line Education Method  Verbal   ACP Education Method  Verbal [Declines]   MyChart Education Method  Verbal [Active]   Advanced Directives:  Not Interested At This Time   Ed Visits past 12 months:  1   Hospitalizations past 12 months  None   Medication Adherence  Medications understood        The main concerns and/or symptoms the patient would like to address are:Talked with patient. Discussed 3/10/20 ED visit regarding left sided headache and expressive aphasia. Patient states symptoms have improved; has contacted PCP for recommendations and is awaiting neurology appointment.Patient states would like further recommendation from neurologist regarding   Amitriptyline. Patient reports left side of head hurts when she coughs. She reports no headache; fever; difficulty with vision; chest pain; SOB; appetite or sleeping.  Patient lives with spouse; independent with ADL's; meal preparation; transportation and ambulates without assistive device . She is  compliant with medications; medical appointments and monitoring of blood pressure as needed.      Education/instruction provided by Care Coordinator: Reviewed with patient ED recommendations; 24/7 Nurse Line Telephone number; ACM contact information; Advance Directives;  My Chart; gaps in care; [facility] Planning and Support phone number and services. Patient verbalized understanding.She states to appreciate phone call and declines  need for further outreach at this time.  No further questions or concerns voiced at this time.     Follow Up Outreach Due:   Follow up as needed.     Bhakti Chapman RN  Ambulatory     3/11/2020, 15:11

## 2020-03-11 NOTE — TELEPHONE ENCOUNTER
Pt says she had a TIA in September. She is now having a migraine. She went to the ER yesterday for this and they are recommending her see neuro.  She has an appt with shira in April. Wants to know if she needs a referral from us or if she needs to be seen sooner.

## 2020-03-13 ENCOUNTER — EPISODE CHANGES (OUTPATIENT)
Dept: CASE MANAGEMENT | Facility: OTHER | Age: 70
End: 2020-03-13

## 2020-03-28 ENCOUNTER — RESULTS ENCOUNTER (OUTPATIENT)
Dept: INTERNAL MEDICINE | Facility: CLINIC | Age: 70
End: 2020-03-28

## 2020-03-28 DIAGNOSIS — E03.9 HYPOTHYROIDISM, UNSPECIFIED TYPE: ICD-10-CM

## 2020-03-28 DIAGNOSIS — E78.5 HYPERLIPIDEMIA, UNSPECIFIED HYPERLIPIDEMIA TYPE: ICD-10-CM

## 2020-04-01 ENCOUNTER — HOSPITAL ENCOUNTER (OUTPATIENT)
Dept: INFUSION THERAPY | Facility: HOSPITAL | Age: 70
Setting detail: INFUSION SERIES
Discharge: HOME | End: 2020-04-01

## 2020-04-01 VITALS
RESPIRATION RATE: 16 BRPM | DIASTOLIC BLOOD PRESSURE: 67 MMHG | WEIGHT: 165 LBS | TEMPERATURE: 97.9 F | HEART RATE: 67 BPM | SYSTOLIC BLOOD PRESSURE: 114 MMHG | HEIGHT: 62 IN | BODY MASS INDEX: 30.36 KG/M2 | OXYGEN SATURATION: 94 %

## 2020-04-01 DIAGNOSIS — K51.311 ULCERATIVE RECTOSIGMOIDITIS WITH RECTAL BLEEDING (HCC): Primary | ICD-10-CM

## 2020-04-01 PROCEDURE — 25010000002 INFLIXIMAB PER 10 MG: Performed by: INTERNAL MEDICINE

## 2020-04-01 PROCEDURE — 96415 CHEMO IV INFUSION ADDL HR: CPT

## 2020-04-01 PROCEDURE — 96413 CHEMO IV INFUSION 1 HR: CPT

## 2020-04-01 RX ADMIN — INFLIXIMAB 750 MG: 100 INJECTION, POWDER, LYOPHILIZED, FOR SOLUTION INTRAVENOUS at 08:56

## 2020-04-01 NOTE — PATIENT INSTRUCTIONS
"Call Dr. Dl Saldaña, Gastroenterology at [phone] if you have any problems or concerns.    We know you have a Choice in healthcare and appreciate you using [facility].  Our purpose is to provide you \"Excellent Care\".  We hope that you will always choose us in the future and continue to recommend us to your family and friends.    Infliximab infusion  What is this medicine?  INFLIXIMAB (in FLIX i mab) is used to treat Crohn's disease and ulcerative colitis. It is also used to treat ankylosing spondylitis, plaque psoriasis, and some forms of arthritis.  This medicine may be used for other purposes; ask your health care provider or pharmacist if you have questions.  COMMON BRAND NAME(S): INFLECTRA, Remicade, RENFLEXIS  What should I tell my health care provider before I take this medicine?  They need to know if you have any of these conditions:  -cancer  -current or past resident of [place]  -diabetes  -exposure to tuberculosis  -Guillain-[place] syndrome  -heart failure  -hepatitis or liver disease  -immune system problems  -infection  -lung or breathing disease, like COPD  -multiple sclerosis  -receiving phototherapy for the skin  -seizure disorder  -an unusual or allergic reaction to infliximab, mouse proteins, other medicines, foods, dyes, or preservatives  -pregnant or trying to get pregnant  -breast-feeding  How should I use this medicine?  This medicine is for injection into a vein. It is usually given by a health care professional in a hospital or clinic setting.  A special MedGuide will be given to you by the pharmacist with each prescription and refill. Be sure to read this information carefully each time.  Talk to your pediatrician regarding the use of this medicine in children. While this drug may be prescribed for children as young as 6 years of age for selected conditions, precautions do apply.  Overdosage: If you think you have taken too much of this " medicine contact a poison control center or emergency room at once.  NOTE: This medicine is only for you. Do not share this medicine with others.  What if I miss a dose?  It is important not to miss your dose. Call your doctor or health care professional if you are unable to keep an appointment.  What may interact with this medicine?  Do not take this medicine with any of the following medications:  -biologic medicines such as abatacept, adalimumab, anakinra, certolizumab, etanercept, golimumab, rituximab, secukinumab, tocilizumab, tofactinib, ustekinumab  -live vaccines  This list may not describe all possible interactions. Give your health care provider a list of all the medicines, herbs, non-prescription drugs, or dietary supplements you use. Also tell them if you smoke, drink alcohol, or use illegal drugs. Some items may interact with your medicine.  What should I watch for while using this medicine?  Your condition will be monitored carefully while you are receiving this medicine. Visit your doctor or health care professional for regular checks on your progress. You may need blood work done while you are taking this medicine. Before beginning therapy, your doctor may do a test to see if you have been exposed to tuberculosis.  Call your doctor or health care professional for advice if you get a fever, chills or sore throat, or other symptoms of a cold or flu. Do not treat yourself. This drug decreases your body's ability to fight infections. Try to avoid being around people who are sick.  This medicine may make the symptoms of heart failure worse in some patients. If you notice symptoms such as increased shortness of breath or swelling of the ankles or legs, contact your health care provider right away.  If you are going to have surgery or dental work, tell your health care professional or dentist that you have received this medicine.  If you take this medicine for plaque psoriasis, stay out of the sun. If you  cannot avoid being in the sun, wear protective clothing and use sunscreen. Do not use sun lamps or tanning beds/booths.  Talk to your doctor about your risk of cancer. You may be more at risk for certain types of cancers if you take this medicine.  What side effects may I notice from receiving this medicine?  Side effects that you should report to your doctor or health care professional as soon as possible:  -allergic reactions like skin rash, itching or hives, swelling of the face, lips, or tongue  -breathing problems  -changes in vision  -chest pain  -fever or chills, usually related to the infusion  -joint pain  -pain, tingling, numbness in the hands or feet  -redness, blistering, peeling or loosening of the skin, including inside the mouth  -seizures  -signs of infection - fever or chills, cough, sore throat, flu-like symptoms, pain or difficulty passing urine  -signs and symptoms of liver injury like dark yellow or brown urine; general ill feeling; light-colored stools; loss of appetite; nausea; right upper belly pain; unusually weak or tired; yellowing of the eyes or skin  -signs and symptoms of a stroke like changes in vision; confusion; trouble speaking or understanding; severe headaches; sudden numbness or weakness of the face, arm or leg; trouble walking; dizziness; loss of balance or coordination  -swelling of the ankles, feet, or hands  -swollen lymph nodes in the neck, underarm, or groin areas  -unusual bleeding or bruising  -unusually weak or tired  Side effects that usually do not require medical attention (report to your doctor or health care professional if they continue or are bothersome):  -headache  -nausea  -stomach pain  -upset stomach  This list may not describe all possible side effects. Call your doctor for medical advice about side effects. You may report side effects to FDA at 5-973-FDA-4820.  Where should I keep my medicine?  This drug is given in a hospital or clinic and will not be  stored at home.  NOTE: This sheet is a summary. It may not cover all possible information. If you have questions about this medicine, talk to your doctor, pharmacist, or health care provider.  © 2020 [employer]vier/Gold Standard (2018-01-17 13:45:32)

## 2020-04-01 NOTE — NURSING NOTE
NURSING PROGRESS NOTE      0815 Pt to [facility] per  scheduled appointment.  Pt ID verified by (2) identifiers. Allergies, medications and medical history reviewed and verified.Pre-meds taken at home 0700, Zyrtec,10 mg and tylenol 1300 mg . Caroline Cerda RN

## 2020-04-03 LAB
ALBUMIN SERPL-MCNC: 4.2 G/DL (ref 3.5–5.2)
ALBUMIN/GLOB SERPL: 1.2 G/DL
ALP SERPL-CCNC: 62 U/L (ref 39–117)
ALT SERPL-CCNC: 12 U/L (ref 1–33)
AST SERPL-CCNC: 22 U/L (ref 1–32)
BASOPHILS # BLD AUTO: 0.01 10*3/MM3 (ref 0–0.2)
BASOPHILS NFR BLD AUTO: 0.2 % (ref 0–1.5)
BILIRUB SERPL-MCNC: 0.4 MG/DL (ref 0.2–1.2)
BUN SERPL-MCNC: 8 MG/DL (ref 8–23)
BUN/CREAT SERPL: 12.5 (ref 7–25)
CALCIUM SERPL-MCNC: 9.5 MG/DL (ref 8.6–10.5)
CHLORIDE SERPL-SCNC: 92 MMOL/L (ref 98–107)
CHOLEST SERPL-MCNC: 208 MG/DL (ref 0–200)
CHOLEST/HDLC SERPL: 4.62 {RATIO}
CO2 SERPL-SCNC: 24.1 MMOL/L (ref 22–29)
CREAT SERPL-MCNC: 0.64 MG/DL (ref 0.57–1)
EOSINOPHIL # BLD AUTO: 0.13 10*3/MM3 (ref 0–0.4)
EOSINOPHIL NFR BLD AUTO: 3 % (ref 0.3–6.2)
ERYTHROCYTE [DISTWIDTH] IN BLOOD BY AUTOMATED COUNT: 12.3 % (ref 12.3–15.4)
GLOBULIN SER CALC-MCNC: 3.5 GM/DL
GLUCOSE SERPL-MCNC: 97 MG/DL (ref 65–99)
HCT VFR BLD AUTO: 39.8 % (ref 34–46.6)
HDLC SERPL-MCNC: 45 MG/DL (ref 40–60)
HGB BLD-MCNC: 13.4 G/DL (ref 12–15.9)
IMM GRANULOCYTES # BLD AUTO: 0.02 10*3/MM3 (ref 0–0.05)
IMM GRANULOCYTES NFR BLD AUTO: 0.5 % (ref 0–0.5)
LDLC SERPL CALC-MCNC: 117 MG/DL (ref 0–100)
LYMPHOCYTES # BLD AUTO: 1.66 10*3/MM3 (ref 0.7–3.1)
LYMPHOCYTES NFR BLD AUTO: 38.2 % (ref 19.6–45.3)
MCH RBC QN AUTO: 32.4 PG (ref 26.6–33)
MCHC RBC AUTO-ENTMCNC: 33.7 G/DL (ref 31.5–35.7)
MCV RBC AUTO: 96.1 FL (ref 79–97)
MONOCYTES # BLD AUTO: 0.28 10*3/MM3 (ref 0.1–0.9)
MONOCYTES NFR BLD AUTO: 6.4 % (ref 5–12)
NEUTROPHILS # BLD AUTO: 2.25 10*3/MM3 (ref 1.7–7)
NEUTROPHILS NFR BLD AUTO: 51.7 % (ref 42.7–76)
NRBC BLD AUTO-RTO: 0 /100 WBC (ref 0–0.2)
PLATELET # BLD AUTO: 408 10*3/MM3 (ref 140–450)
POTASSIUM SERPL-SCNC: 4.7 MMOL/L (ref 3.5–5.2)
PROT SERPL-MCNC: 7.7 G/DL (ref 6–8.5)
RBC # BLD AUTO: 4.14 10*6/MM3 (ref 3.77–5.28)
SODIUM SERPL-SCNC: 132 MMOL/L (ref 136–145)
T4 FREE SERPL-MCNC: 1.23 NG/DL (ref 0.93–1.7)
TRIGL SERPL-MCNC: 229 MG/DL (ref 0–150)
TSH SERPL DL<=0.005 MIU/L-ACNC: 2.4 UIU/ML (ref 0.27–4.2)
VLDLC SERPL CALC-MCNC: 45.8 MG/DL (ref 5–40)
WBC # BLD AUTO: 4.35 10*3/MM3 (ref 3.4–10.8)

## 2020-04-10 ENCOUNTER — TELEPHONE (OUTPATIENT)
Dept: INTERNAL MEDICINE | Facility: CLINIC | Age: 70
End: 2020-04-10

## 2020-04-10 NOTE — TELEPHONE ENCOUNTER
----- Message from Juliano Mendez MD sent at 4/6/2020 11:38 AM EDT -----  Change to video visit please.  She is immunosuppressed.

## 2020-04-13 ENCOUNTER — OFFICE VISIT (OUTPATIENT)
Dept: INTERNAL MEDICINE | Facility: CLINIC | Age: 70
End: 2020-04-13

## 2020-04-13 ENCOUNTER — HOSPITAL ENCOUNTER (OUTPATIENT)
Dept: INFUSION THERAPY | Facility: HOSPITAL | Age: 70
Discharge: HOME | End: 2020-04-13
Admitting: FAMILY MEDICINE

## 2020-04-13 VITALS
RESPIRATION RATE: 16 BRPM | OXYGEN SATURATION: 97 % | BODY MASS INDEX: 30.02 KG/M2 | HEART RATE: 74 BPM | SYSTOLIC BLOOD PRESSURE: 138 MMHG | TEMPERATURE: 97.5 F | HEIGHT: 62 IN | DIASTOLIC BLOOD PRESSURE: 70 MMHG | WEIGHT: 163.14 LBS

## 2020-04-13 VITALS
WEIGHT: 163.2 LBS | RESPIRATION RATE: 16 BRPM | DIASTOLIC BLOOD PRESSURE: 70 MMHG | BODY MASS INDEX: 30.03 KG/M2 | SYSTOLIC BLOOD PRESSURE: 138 MMHG | HEART RATE: 68 BPM | HEIGHT: 62 IN | OXYGEN SATURATION: 97 %

## 2020-04-13 DIAGNOSIS — M81.0 AGE-RELATED OSTEOPOROSIS WITHOUT CURRENT PATHOLOGICAL FRACTURE: ICD-10-CM

## 2020-04-13 DIAGNOSIS — M05.79 RHEUMATOID ARTHRITIS INVOLVING MULTIPLE SITES WITH POSITIVE RHEUMATOID FACTOR (HCC): ICD-10-CM

## 2020-04-13 DIAGNOSIS — I45.6 WOLFF-PARKINSON-WHITE (WPW) PATTERN: ICD-10-CM

## 2020-04-13 DIAGNOSIS — M81.0 AGE-RELATED OSTEOPOROSIS WITHOUT CURRENT PATHOLOGICAL FRACTURE: Primary | ICD-10-CM

## 2020-04-13 DIAGNOSIS — E78.5 HYPERLIPIDEMIA, UNSPECIFIED HYPERLIPIDEMIA TYPE: Primary | ICD-10-CM

## 2020-04-13 DIAGNOSIS — K51.011 ULCERATIVE PANCOLITIS WITH RECTAL BLEEDING (HCC): ICD-10-CM

## 2020-04-13 DIAGNOSIS — Z00.00 ROUTINE HEALTH MAINTENANCE: ICD-10-CM

## 2020-04-13 DIAGNOSIS — F32.A DEPRESSION, UNSPECIFIED DEPRESSION TYPE: ICD-10-CM

## 2020-04-13 DIAGNOSIS — G45.9 TIA (TRANSIENT ISCHEMIC ATTACK): ICD-10-CM

## 2020-04-13 DIAGNOSIS — E03.9 HYPOTHYROIDISM, UNSPECIFIED TYPE: ICD-10-CM

## 2020-04-13 PROCEDURE — 99214 OFFICE O/P EST MOD 30 MIN: CPT | Performed by: FAMILY MEDICINE

## 2020-04-13 PROCEDURE — 25010000002 DENOSUMAB 60 MG/ML SOLUTION PREFILLED SYRINGE: Performed by: FAMILY MEDICINE

## 2020-04-13 PROCEDURE — 96372 THER/PROPH/DIAG INJ SC/IM: CPT

## 2020-04-13 RX ADMIN — DENOSUMAB 60 MG: 60 INJECTION SUBCUTANEOUS at 10:54

## 2020-04-13 NOTE — PROGRESS NOTES
Subjective     Yelena Rangel is a 69 y.o. female, who presents with a chief complaint of   Chief Complaint   Patient presents with   • Follow-up   • Hyperlipidemia   • Hypothyroidism       Depression   Hypothyroidism   Associated symptoms include arthralgias.   Hyperlipidemia   Exacerbating diseases include hypothyroidism.     1. Hyperlipidemia.  Doesn't tolerate statins.  She has been exercising regularly.    2. Ulcerative colitis.  On Remicade every 2 months per Dr. Saldaña.  She is feeling well.    3. Depression.  Pt reports she is still doing well with venlafaxine.    4. Rheumatoid arthritis. This is also controlled with the Remicade.    The following portions of the patient's history were reviewed and updated as appropriate: allergies, current medications, past family history, past medical history, past social history, past surgical history and problem list.    Allergies: Sulfa antibiotics    Review of Systems   Constitutional: Negative.    HENT: Negative.    Eyes: Negative.    Respiratory: Negative.    Cardiovascular: Negative.    Gastrointestinal: Negative.    Endocrine: Negative.    Genitourinary: Negative.    Musculoskeletal: Positive for arthralgias.   Skin: Negative.    Allergic/Immunologic: Positive for environmental allergies.   Neurological: Negative.    Hematological: Negative.    Psychiatric/Behavioral: Negative.        Objective     Wt Readings from Last 3 Encounters:   04/13/20 74 kg (163 lb 3.2 oz)   03/31/20 74.8 kg (165 lb)   03/10/20 74.8 kg (165 lb)     Temp Readings from Last 3 Encounters:   04/01/20 97.9 °F (36.6 °C)   03/10/20 97.6 °F (36.4 °C) (Oral)   02/05/20 97.3 °F (36.3 °C) (Oral)     BP Readings from Last 3 Encounters:   04/13/20 138/70   04/01/20 114/67   03/10/20 130/83     Pulse Readings from Last 3 Encounters:   04/13/20 68   04/01/20 67   03/10/20 70     Body mass index is 29.84 kg/m².  SpO2 Readings from Last 3 Encounters:   09/27/17 98%   03/29/17 98%   09/28/16 97%        Physical Exam   Constitutional: She is oriented to person, place, and time. She appears well-developed and well-nourished. No distress.   HENT:   Head: Normocephalic and atraumatic.   Eyes: Conjunctivae and EOM are normal.   Neck: Neck supple. No thyromegaly present.   Pulmonary/Chest: Effort normal. No respiratory distress.   Abdominal: There is no hepatosplenomegaly.   Musculoskeletal: Normal range of motion. She exhibits no edema.   Neurological: She is alert and oriented to person, place, and time.   Skin: Skin is warm and dry.   Psychiatric: She has a normal mood and affect. Her behavior is normal.   Nursing note and vitals reviewed.      Results for orders placed or performed in visit on 03/28/20   Comprehensive Metabolic Panel   Result Value Ref Range    Glucose 97 65 - 99 mg/dL    BUN 8 8 - 23 mg/dL    Creatinine 0.64 0.57 - 1.00 mg/dL    eGFR Non African Am 92 >60 mL/min/1.73    eGFR African Am 112 >60 mL/min/1.73    BUN/Creatinine Ratio 12.5 7.0 - 25.0    Sodium 132 (L) 136 - 145 mmol/L    Potassium 4.7 3.5 - 5.2 mmol/L    Chloride 92 (L) 98 - 107 mmol/L    Total CO2 24.1 22.0 - 29.0 mmol/L    Calcium 9.5 8.6 - 10.5 mg/dL    Total Protein 7.7 6.0 - 8.5 g/dL    Albumin 4.20 3.50 - 5.20 g/dL    Globulin 3.5 gm/dL    A/G Ratio 1.2 g/dL    Total Bilirubin 0.4 0.2 - 1.2 mg/dL    Alkaline Phosphatase 62 39 - 117 U/L    AST (SGOT) 22 1 - 32 U/L    ALT (SGPT) 12 1 - 33 U/L   TSH   Result Value Ref Range    TSH 2.400 0.270 - 4.200 uIU/mL   T4, Free   Result Value Ref Range    Free T4 1.23 0.93 - 1.70 ng/dL   Lipid Panel With / Chol / HDL Ratio   Result Value Ref Range    Total Cholesterol 208 (H) 0 - 200 mg/dL    Triglycerides 229 (H) 0 - 150 mg/dL    HDL Cholesterol 45 40 - 60 mg/dL    VLDL Cholesterol 45.8 (H) 5 - 40 mg/dL    LDL Cholesterol  117 (H) 0 - 100 mg/dL    Chol/HDL Ratio 4.62    CBC & Differential   Result Value Ref Range    WBC 4.35 3.40 - 10.80 10*3/mm3    RBC 4.14 3.77 - 5.28 10*6/mm3     Hemoglobin 13.4 12.0 - 15.9 g/dL    Hematocrit 39.8 34.0 - 46.6 %    MCV 96.1 79.0 - 97.0 fL    MCH 32.4 26.6 - 33.0 pg    MCHC 33.7 31.5 - 35.7 g/dL    RDW 12.3 12.3 - 15.4 %    Platelets 408 140 - 450 10*3/mm3    Neutrophil Rel % 51.7 42.7 - 76.0 %    Lymphocyte Rel % 38.2 19.6 - 45.3 %    Monocyte Rel % 6.4 5.0 - 12.0 %    Eosinophil Rel % 3.0 0.3 - 6.2 %    Basophil Rel % 0.2 0.0 - 1.5 %    Neutrophils Absolute 2.25 1.70 - 7.00 10*3/mm3    Lymphocytes Absolute 1.66 0.70 - 3.10 10*3/mm3    Monocytes Absolute 0.28 0.10 - 0.90 10*3/mm3    Eosinophils Absolute 0.13 0.00 - 0.40 10*3/mm3    Basophils Absolute 0.01 0.00 - 0.20 10*3/mm3    Immature Granulocyte Rel % 0.5 0.0 - 0.5 %    Immature Grans Absolute 0.02 0.00 - 0.05 10*3/mm3    nRBC 0.0 0.0 - 0.2 /100 WBC       Assessment/Plan   Yelena was seen today for follow-up, hyperlipidemia and hypothyroidism.    Diagnoses and all orders for this visit:    Hyperlipidemia, unspecified hyperlipidemia type  -     Comprehensive Metabolic Panel; Future  -     Lipid Panel With / Chol / HDL Ratio; Future    Hypothyroidism, unspecified type  -     CBC & Differential; Future  -     TSH; Future  -     T4, Free; Future    Depression, unspecified depression type    Age-related osteoporosis without current pathological fracture    Ulcerative pancolitis with rectal bleeding (CMS/HCC)    Liseth-Parkinson-White (WPW) pattern    Rheumatoid arthritis involving multiple sites with positive rheumatoid factor (CMS/HCC)    TIA (transient ischemic attack)    Routine health maintenance    1. Hyperlipidemia.  Off statin.  Continue lifestyle measures.     2. Hypothyroidism.  Adequately replaced.    3. Depression.  Controlled.  Continue venlafaxine and lifestyle measures.     4. Osteoporosis.  Continue Prolia.    5. Ulcerative colitis.  Well-controlled.  Continue per Dr. Saldaña.    6. Liseth-Parkinson-White.  She saw cardiology before her knee replacements.    7. RA.  Controlled with  Remicade.    8. TIA.  Continue ASA 81 mg.  She does not tolerate statins.  Consider Zetia.    9. Routine health maint.  Prevnar and Pneumovax UTD.  Mammogram UTD.  Had first dose Shingrix at pharmacy.      Outpatient Medications Prior to Visit   Medication Sig Dispense Refill   • Acetaminophen (TYLENOL ARTHRITIS PAIN PO) Take 1 tablet by mouth 3 (Three) Times a Day. AS NEEDED FOR ARTHRITIS     • aspirin 81 MG chewable tablet Chew 81 mg Daily.     • Calcium Carb-Cholecalciferol (CALCIUM PLUS VITAMIN D3 PO) Take 1,400 mg by mouth Daily. D3 = 1500 IU     • cetirizine (ZyrTEC) 10 MG tablet Take 10 mg by mouth daily.     • InFLIXimab (REMICADE IV) Infuse 1 dose into a venous catheter Every 30 (Thirty) Days. Every 60 days.  Dr. Saldaña.     • levothyroxine (SYNTHROID, LEVOTHROID) 100 MCG tablet TAKE ONE TABLET BY MOUTH DAILY 90 tablet 0   • Multiple Vitamins-Minerals (MULTIVITAMIN WITH MINERALS) tablet tablet Take 1 tablet by mouth Daily.     • omeprazole (priLOSEC) 20 MG capsule TAKE ONE CAPSULE BY MOUTH DAILY 90 capsule 0   • Probiotic Product (TRUNATURE DIGESTIVE PROBIOTIC PO) Take  by mouth Daily.     • venlafaxine XR (EFFEXOR-XR) 75 MG 24 hr capsule TAKE ONE CAPSULE BY MOUTH DAILY 90 capsule 0     No facility-administered medications prior to visit.      No orders of the defined types were placed in this encounter.    [unfilled]  There are no discontinued medications.    Return in about 6 months (around 10/13/2020).

## 2020-04-13 NOTE — NURSING NOTE
NURSING PROGRESS NOTE      1030 Pt to [facility] per  scheduled appointment.  Pt ID verified by (2) identifiers. Allergies, medications and medical history reviewed and verified.Ptt had seen PCP, ok'd to receive Prolia.. Tolerated prescribed treatment without incident. Patient declined AVS, Pt verbalized understanding.  Discharged to home . Condition Satisfactory .  Caroline Cerda RN

## 2020-05-26 NOTE — PATIENT INSTRUCTIONS
"Call Dr. Dl Saldaña, Gastroenterology at [phone] if you have any problems or concerns.    We know you have a Choice in healthcare and appreciate you using [facility].  Our purpose is to provide you \"Excellent Care\".  We hope that you will always choose us in the future and continue to recommend us to your family and friends.    Infliximab infusion   What is this medicine?  INFLIXIMAB (in FLIX i mab) is used to treat Crohn's disease and ulcerative colitis. It is also used to treat ankylosing spondylitis, plaque psoriasis, and some forms of arthritis.  This medicine may be used for other purposes; ask your health care provider or pharmacist if you have questions.  COMMON BRAND NAME(S): INFLECTRA, Remicade, RENFLEXIS  What should I tell my health care provider before I take this medicine?  They need to know if you have any of these conditions:  · cancer  · current or past resident of [place]  · diabetes  · exposure to tuberculosis  · Guillain-[place] syndrome  · heart failure  · hepatitis or liver disease  · immune system problems  · infection  · lung or breathing disease, like COPD  · multiple sclerosis  · receiving phototherapy for the skin  · seizure disorder  · an unusual or allergic reaction to infliximab, mouse proteins, other medicines, foods, dyes, or preservatives  · pregnant or trying to get pregnant  · breast-feeding  How should I use this medicine?  This medicine is for injection into a vein. It is usually given by a health care professional in a hospital or clinic setting.  A special MedGuide will be given to you by the pharmacist with each prescription and refill. Be sure to read this information carefully each time.  Talk to your pediatrician regarding the use of this medicine in children. While this drug may be prescribed for children as young as 6 years of age for selected conditions, precautions do apply.  Overdosage: If you think you have taken too " much of this medicine contact a poison control center or emergency room at once.  NOTE: This medicine is only for you. Do not share this medicine with others.  What if I miss a dose?  It is important not to miss your dose. Call your doctor or health care professional if you are unable to keep an appointment.  What may interact with this medicine?  Do not take this medicine with any of the following medications:  · biologic medicines such as abatacept, adalimumab, anakinra, certolizumab, etanercept, golimumab, rituximab, secukinumab, tocilizumab, tofactinib, ustekinumab  · live vaccines  This list may not describe all possible interactions. Give your health care provider a list of all the medicines, herbs, non-prescription drugs, or dietary supplements you use. Also tell them if you smoke, drink alcohol, or use illegal drugs. Some items may interact with your medicine.  What should I watch for while using this medicine?  Your condition will be monitored carefully while you are receiving this medicine. Visit your doctor or health care professional for regular checks on your progress. You may need blood work done while you are taking this medicine. Before beginning therapy, your doctor may do a test to see if you have been exposed to tuberculosis.  Call your doctor or health care professional for advice if you get a fever, chills or sore throat, or other symptoms of a cold or flu. Do not treat yourself. This drug decreases your body's ability to fight infections. Try to avoid being around people who are sick.  This medicine may make the symptoms of heart failure worse in some patients. If you notice symptoms such as increased shortness of breath or swelling of the ankles or legs, contact your health care provider right away.  If you are going to have surgery or dental work, tell your health care professional or dentist that you have received this medicine.  If you take this medicine for plaque psoriasis, stay out of  the sun. If you cannot avoid being in the sun, wear protective clothing and use sunscreen. Do not use sun lamps or tanning beds/booths.  Talk to your doctor about your risk of cancer. You may be more at risk for certain types of cancers if you take this medicine.  What side effects may I notice from receiving this medicine?  Side effects that you should report to your doctor or health care professional as soon as possible:  · allergic reactions like skin rash, itching or hives, swelling of the face, lips, or tongue  · breathing problems  · changes in vision  · chest pain  · fever or chills, usually related to the infusion  · joint pain  · pain, tingling, numbness in the hands or feet  · redness, blistering, peeling or loosening of the skin, including inside the mouth  · seizures  · signs of infection - fever or chills, cough, sore throat, flu-like symptoms, pain or difficulty passing urine  · signs and symptoms of liver injury like dark yellow or brown urine; general ill feeling; light-colored stools; loss of appetite; nausea; right upper belly pain; unusually weak or tired; yellowing of the eyes or skin  · signs and symptoms of a stroke like changes in vision; confusion; trouble speaking or understanding; severe headaches; sudden numbness or weakness of the face, arm or leg; trouble walking; dizziness; loss of balance or coordination  · swelling of the ankles, feet, or hands  · swollen lymph nodes in the neck, underarm, or groin areas  · unusual bleeding or bruising  · unusually weak or tired  Side effects that usually do not require medical attention (report to your doctor or health care professional if they continue or are bothersome):  · headache  · nausea  · stomach pain  · upset stomach  This list may not describe all possible side effects. Call your doctor for medical advice about side effects. You may report side effects to FDA at 5-769-FDA-5916.  Where should I keep my medicine?  This drug is given in a  hospital or clinic and will not be stored at home.  NOTE: This sheet is a summary. It may not cover all possible information. If you have questions about this medicine, talk to your doctor, pharmacist, or health care provider.  © 2020 [employer]/Gold Standard (2018-01-17 13:45:32)

## 2020-05-27 ENCOUNTER — HOSPITAL ENCOUNTER (OUTPATIENT)
Dept: INFUSION THERAPY | Facility: HOSPITAL | Age: 70
Discharge: HOME | End: 2020-05-27
Admitting: INTERNAL MEDICINE

## 2020-05-27 VITALS
SYSTOLIC BLOOD PRESSURE: 141 MMHG | HEART RATE: 74 BPM | HEIGHT: 62 IN | TEMPERATURE: 97.3 F | RESPIRATION RATE: 16 BRPM | BODY MASS INDEX: 30.73 KG/M2 | WEIGHT: 167 LBS | OXYGEN SATURATION: 96 % | DIASTOLIC BLOOD PRESSURE: 76 MMHG

## 2020-05-27 DIAGNOSIS — K51.311 ULCERATIVE RECTOSIGMOIDITIS WITH RECTAL BLEEDING (HCC): Primary | ICD-10-CM

## 2020-05-27 PROCEDURE — 96415 CHEMO IV INFUSION ADDL HR: CPT

## 2020-05-27 PROCEDURE — 25010000002 INFLIXIMAB PER 10 MG: Performed by: INTERNAL MEDICINE

## 2020-05-27 PROCEDURE — 96413 CHEMO IV INFUSION 1 HR: CPT

## 2020-05-27 RX ADMIN — INFLIXIMAB 760 MG: 100 INJECTION, POWDER, LYOPHILIZED, FOR SOLUTION INTRAVENOUS at 09:07

## 2020-05-27 NOTE — NURSING NOTE
NURSING PROGRESS NOTE      Tolerated prescribed treatment without incident. Patient receivedAVS, Pt verbalized understanding.  Discharged to home @1150. Condition Satisfactory .  Caroline Cerda RN

## 2020-05-27 NOTE — NURSING NOTE
NURSING PROGRESS NOTE      0815 Pt to [facility] per  scheduled appointment.  Pt ID verified by (2) identifiers. Allergies, medications and medical history reviewed and verified. Pre-meds taken@ 0700 , tylenol 1300 mg, zrytec 10 mg.Caroline Cerda RN

## 2020-06-01 RX ORDER — VENLAFAXINE HYDROCHLORIDE 75 MG/1
CAPSULE, EXTENDED RELEASE ORAL
Qty: 90 CAPSULE | Refills: 0 | Status: SHIPPED | OUTPATIENT
Start: 2020-06-01 | End: 2020-08-20

## 2020-06-05 DIAGNOSIS — E03.9 HYPOTHYROIDISM, UNSPECIFIED TYPE: ICD-10-CM

## 2020-06-08 DIAGNOSIS — E03.9 HYPOTHYROIDISM, UNSPECIFIED TYPE: ICD-10-CM

## 2020-06-08 RX ORDER — LEVOTHYROXINE SODIUM 0.1 MG/1
TABLET ORAL
Qty: 90 TABLET | Refills: 0 | Status: SHIPPED | OUTPATIENT
Start: 2020-06-08 | End: 2020-06-11

## 2020-06-08 RX ORDER — OMEPRAZOLE 20 MG/1
CAPSULE, DELAYED RELEASE ORAL
Qty: 90 CAPSULE | Refills: 0 | Status: SHIPPED | OUTPATIENT
Start: 2020-06-08 | End: 2020-09-09

## 2020-06-11 RX ORDER — LEVOTHYROXINE SODIUM 0.1 MG/1
TABLET ORAL
Qty: 90 TABLET | Refills: 1 | Status: SHIPPED | OUTPATIENT
Start: 2020-06-11 | End: 2021-03-01

## 2020-06-24 ENCOUNTER — OFFICE VISIT (OUTPATIENT)
Dept: NEUROLOGY | Facility: CLINIC | Age: 70
End: 2020-06-24

## 2020-06-24 VITALS
DIASTOLIC BLOOD PRESSURE: 81 MMHG | WEIGHT: 169 LBS | SYSTOLIC BLOOD PRESSURE: 137 MMHG | BODY MASS INDEX: 31.1 KG/M2 | HEIGHT: 62 IN | OXYGEN SATURATION: 95 % | HEART RATE: 72 BPM

## 2020-06-24 DIAGNOSIS — R47.01 APHASIA: ICD-10-CM

## 2020-06-24 DIAGNOSIS — R51.9 NONINTRACTABLE EPISODIC HEADACHE, UNSPECIFIED HEADACHE TYPE: Primary | ICD-10-CM

## 2020-06-24 PROCEDURE — 99205 OFFICE O/P NEW HI 60 MIN: CPT | Performed by: PSYCHIATRY & NEUROLOGY

## 2020-06-24 NOTE — PROGRESS NOTES
"Chief Complaint   Patient presents with   • Transient Ischemic Attack       Patient ID: Yelena Rangel is a 69 y.o. female.    HPI: I thank you for referring your patient to see us here in the neurology clinic this afternoon.  As you may know Yelena is a 69-year-old female with TIA-like symptoms.  She states that Labor Day of 2019 she awoke and after sitting down for a while discovered that she had a severe headache on the left side of her frontal head region.  She states that several minutes later she had difficulty with words.  She said that she had \"jumbled words\".  She had no focal weakness of her arms or legs.  No numbness.  Headache itself lasted approximately 10 minutes as well as the speech symptoms.  She said that she knew what she wanted to say however just could not get the words out correctly.  She went to the emergency room.  By the time she arrived she was feeling back to baseline.  She was thought to maybe be having a myocardial infarction due to increased troponin.  She was then sent to a hospital in [place].  A complete neuro diagnostic work-up including MRI echocardiogram and carotid Dopplers were within normal limits.  She was placed on aspirin 81 mg daily.  She was discharged to home.  She had another episode this past March which was essentially identical.  She did go back to the emergency room and once again had a negative neurodiagnostic work-up including MRI.  The headache that she experiences is not associated with significant sensitivity to light or sound.  No nausea or vomiting.  She says it is a stabbing or sharp-like sensation and typically last 10 to 15 minutes.  She says that she has had a few other episodes like this within the past 2 years.  Of note she was started on Remicade approximately 2-1/2 years ago.  The patient shows me a sheet of paper with the side effect profile for Remicade.  She has highlighted trouble with speech as well as headache.  Her sister and mother both have " history of migraine.  She denies any prior history of headache issues.    The following portions of the patient's history were reviewed and updated as appropriate: allergies, current medications, past family history, past medical history, past social history, past surgical history and problem list.    Review of Systems   Constitutional: Negative for activity change, appetite change and fatigue.   HENT: Negative for facial swelling, hearing loss and trouble swallowing.    Eyes: Negative for photophobia, redness and visual disturbance.   Respiratory: Negative for chest tightness, shortness of breath and wheezing.    Cardiovascular: Negative for chest pain, palpitations and leg swelling.   Gastrointestinal: Negative for abdominal pain, nausea and vomiting.   Endocrine: Negative for cold intolerance, heat intolerance and polydipsia.   Musculoskeletal: Negative for back pain, gait problem and neck pain.   Skin: Negative for color change, rash and wound.   Allergic/Immunologic: Negative for environmental allergies, food allergies and immunocompromised state.   Neurological: Negative for dizziness, tremors, seizures, syncope, facial asymmetry, speech difficulty, weakness, light-headedness, numbness and headaches.   Hematological: Negative for adenopathy. Does not bruise/bleed easily.   Psychiatric/Behavioral: Negative for agitation, behavioral problems, confusion, decreased concentration, dysphoric mood, hallucinations, self-injury, sleep disturbance and suicidal ideas. The patient is not nervous/anxious and is not hyperactive.       I have reviewed the review of systems above performed by my medical assistant.      Vitals:    06/24/20 0825   BP: 137/81   Pulse: 72   SpO2: 95%       Neurologic Exam     Mental Status   Oriented to person, place, and time.   Registration: recalls 3 of 3 objects. Follows 3 step commands.   Attention: normal. Concentration: normal.   Speech: speech is normal   Level of consciousness:  alert  Knowledge: consistent with education (No deficits found.).   Normal comprehension.     Cranial Nerves     CN II   Visual fields full to confrontation.     CN III, IV, VI   Pupils are equal, round, and reactive to light.  Extraocular motions are normal.   CN III: no CN III palsy  CN VI: no CN VI palsy  Nystagmus: none   Diplopia: none    CN V   Facial sensation intact.     CN VII   Facial expression full, symmetric.     CN VIII   CN VIII normal.     CN IX, X   CN IX normal.   CN X normal.     CN XI   CN XI normal.     CN XII   CN XII normal.     Motor Exam   Muscle bulk: normal  Right arm tone: normal  Left arm tone: normal  Right leg tone: normal  Left leg tone: normal    Strength   Right neck flexion: 5/5  Left neck flexion: 5/5  Right neck extension: 5/5  Left neck extension: 5/5  Right deltoid: 5/5  Left deltoid: 5/5  Right biceps: 5/5  Left biceps: 5/5  Right triceps: 5/5  Left triceps: 5/5  Right wrist flexion: 5/5  Left wrist flexion: 5/5  Right wrist extension: 5/5  Left wrist extension: 5/5  Right interossei: 5/5  Left interossei: 5/5  Right abdominals: 5/5  Left abdominals: 5/5  Right iliopsoas: 5/5  Left iliopsoas: 5/5  Right quadriceps: 5/5  Left quadriceps: 5/5  Right hamstrin/5  Left hamstrin/5  Right glutei: 5/5  Left glutei: 5/5  Right anterior tibial: 5/5  Left anterior tibial: 5/5  Right posterior tibial: 5/5  Left posterior tibial: 5/5  Right peroneal: 5/5  Left peroneal: 5/5  Right gastroc: 5/5  Left gastroc: 5/5    Sensory Exam   Light touch normal.   Vibration normal.   Proprioception normal.   Pinprick normal.     Gait, Coordination, and Reflexes     Gait  Gait: normal    Coordination   Romberg: negative    Tremor   Resting tremor: absent  Intention tremor: absent    Reflexes   Right brachioradialis: 2+  Left brachioradialis: 2+  Right biceps: 2+  Left biceps: 2+  Right triceps: 2+  Left triceps: 2+  Right patellar: 2+  Left patellar: 2+  Right achilles: 2+  Left achilles:  2+  Right : 2+  Left : 2+Station is normal.       Physical Exam   Constitutional: She is oriented to person, place, and time. She appears well-developed. No distress.   HENT:   Head: Normocephalic and atraumatic.   Eyes: Pupils are equal, round, and reactive to light. EOM are normal.   Neck: Normal range of motion.   Cardiovascular: Normal rate, regular rhythm and normal heart sounds.   Pulmonary/Chest: Effort normal and breath sounds normal. No respiratory distress.   Abdominal: Soft. Bowel sounds are normal. She exhibits no distension. There is no tenderness.   Musculoskeletal: She exhibits no edema or deformity.   Neurological: She is oriented to person, place, and time. She has a normal Romberg Test. Gait normal.   Reflex Scores:       Tricep reflexes are 2+ on the right side and 2+ on the left side.       Bicep reflexes are 2+ on the right side and 2+ on the left side.       Brachioradialis reflexes are 2+ on the right side and 2+ on the left side.       Patellar reflexes are 2+ on the right side and 2+ on the left side.       Achilles reflexes are 2+ on the right side and 2+ on the left side.  Skin: Skin is warm. No rash noted.   Psychiatric: She has a normal mood and affect. Her speech is normal. Judgment normal.   Vitals reviewed.      Procedures    Assessment/Plan: We are likely dealing with a side effect of the Remicade which clearly lists her symptoms versus some type of migraine phenomenon.  I did mention that she should speak with her GI specialist about this medication and perhaps she may be able to get on something else for the ulcerative colitis and rheumatoid arthritis.  We did talk about migraine preventative medication however she would like to forego that for now.  I would like to see her back in approximately 4 to 5 months to follow-up on symptoms.  A total of 60 minutes was spent face-to-face with the patient today.  Of that greater than 50% of this time was spent discussing signs and  symptoms of migraine headache, CVA, TIAs, medication side effect, patient education, plan of care and prognosis.       Yelena was seen today for transient ischemic attack.    Diagnoses and all orders for this visit:    Nonintractable episodic headache, unspecified headache type    Aphasia           Sunyn Carrion II, MD

## 2020-06-25 ENCOUNTER — TELEPHONE (OUTPATIENT)
Dept: GASTROENTEROLOGY | Facility: CLINIC | Age: 70
End: 2020-06-25

## 2020-07-22 ENCOUNTER — HOSPITAL ENCOUNTER (OUTPATIENT)
Dept: INFUSION THERAPY | Facility: HOSPITAL | Age: 70
Discharge: HOME | End: 2020-07-22
Admitting: INTERNAL MEDICINE

## 2020-07-22 VITALS
BODY MASS INDEX: 30.73 KG/M2 | SYSTOLIC BLOOD PRESSURE: 145 MMHG | OXYGEN SATURATION: 96 % | HEART RATE: 70 BPM | RESPIRATION RATE: 16 BRPM | TEMPERATURE: 97.3 F | DIASTOLIC BLOOD PRESSURE: 86 MMHG | WEIGHT: 167 LBS | HEIGHT: 62 IN

## 2020-07-22 DIAGNOSIS — K51.311 ULCERATIVE RECTOSIGMOIDITIS WITH RECTAL BLEEDING (HCC): Primary | ICD-10-CM

## 2020-07-22 PROCEDURE — 25010000002 INFLIXIMAB PER 10 MG: Performed by: INTERNAL MEDICINE

## 2020-07-22 PROCEDURE — 96415 CHEMO IV INFUSION ADDL HR: CPT

## 2020-07-22 PROCEDURE — 96413 CHEMO IV INFUSION 1 HR: CPT

## 2020-07-22 RX ADMIN — INFLIXIMAB 758 MG: 100 INJECTION, POWDER, LYOPHILIZED, FOR SOLUTION INTRAVENOUS at 08:48

## 2020-07-22 NOTE — NURSING NOTE
NURSING PROGRESS NOTE      0820 Pt to [facility] per  scheduled appointment.  Pt ID verified by (2) identifiers. Allergies, medications and medical history reviewed and verified. Tylenol and zyrtec taken at home prior to arrival for pre -meds .Caroline Cerda RN

## 2020-07-22 NOTE — NURSING NOTE
NURSING PROGRESS NOTE      Pt Tolerated prescribed treatment without incident. Patient received AVS, Pt verbalized understanding.  Discharged to home @1135.  Condition Satisfactory .  Caroline Cerda RN

## 2020-08-20 RX ORDER — VENLAFAXINE HYDROCHLORIDE 75 MG/1
CAPSULE, EXTENDED RELEASE ORAL
Qty: 90 CAPSULE | Refills: 1 | Status: SHIPPED | OUTPATIENT
Start: 2020-08-20 | End: 2021-03-15

## 2020-09-09 RX ORDER — OMEPRAZOLE 20 MG/1
CAPSULE, DELAYED RELEASE ORAL
Qty: 90 CAPSULE | Refills: 0 | Status: SHIPPED | OUTPATIENT
Start: 2020-09-09 | End: 2020-11-30

## 2020-09-15 ENCOUNTER — TELEPHONE (OUTPATIENT)
Dept: SURGERY | Facility: CLINIC | Age: 70
End: 2020-09-15

## 2020-09-15 NOTE — TELEPHONE ENCOUNTER
ROSA CALLED AND SAID PT IS COMING IN TOMORROW FOR A REMICADE TREATMENT BUT THAT PT'S CURRENT PLAN NEEDS REVIEW BY [facility].  NEEDS TO HAPPEN BEFORE TOMORROW.

## 2020-09-16 ENCOUNTER — HOSPITAL ENCOUNTER (OUTPATIENT)
Dept: INFUSION THERAPY | Facility: HOSPITAL | Age: 70
Discharge: HOME | End: 2020-09-16
Admitting: INTERNAL MEDICINE

## 2020-09-16 VITALS
OXYGEN SATURATION: 96 % | HEART RATE: 82 BPM | TEMPERATURE: 97.5 F | DIASTOLIC BLOOD PRESSURE: 94 MMHG | BODY MASS INDEX: 30.9 KG/M2 | SYSTOLIC BLOOD PRESSURE: 136 MMHG | WEIGHT: 169 LBS | RESPIRATION RATE: 16 BRPM

## 2020-09-16 DIAGNOSIS — K51.311 ULCERATIVE RECTOSIGMOIDITIS WITH RECTAL BLEEDING (HCC): ICD-10-CM

## 2020-09-16 DIAGNOSIS — K51.20 ULCERATIVE PROCTITIS WITHOUT COMPLICATION (HCC): Primary | ICD-10-CM

## 2020-09-16 PROCEDURE — 96365 THER/PROPH/DIAG IV INF INIT: CPT

## 2020-09-16 PROCEDURE — 96366 THER/PROPH/DIAG IV INF ADDON: CPT

## 2020-09-16 PROCEDURE — 96413 CHEMO IV INFUSION 1 HR: CPT

## 2020-09-16 PROCEDURE — 25010000002 INFLIXIMAB PER 10 MG: Performed by: INTERNAL MEDICINE

## 2020-09-16 PROCEDURE — 96415 CHEMO IV INFUSION ADDL HR: CPT

## 2020-09-16 RX ADMIN — INFLIXIMAB 400 MG: 100 INJECTION, POWDER, LYOPHILIZED, FOR SOLUTION INTRAVENOUS at 08:48

## 2020-09-16 NOTE — NURSING NOTE
0810  Pt here for Remicade infusion.  Pharmacy informed or current  weight.  Pt took premeds at home Tylenol and Zyrtec.

## 2020-09-16 NOTE — PATIENT INSTRUCTIONS
"  Call Dr. Dl Saldaña, Gastroenterology at [phone] if you have any problems or concerns.    We know you have a Choice in healthcare and appreciate you using [facility].  Our purpose is to provide you \"Excellent Care\".  We hope that you will always choose us in the future and continue to recommend us to your family and friends.            Infliximab injection  What is this medicine?  INFLIXIMAB (in FLIX i mab) is used to treat Crohn's disease and ulcerative colitis. It is also used to treat ankylosing spondylitis, plaque psoriasis, and some forms of arthritis.  This medicine may be used for other purposes; ask your health care provider or pharmacist if you have questions.  COMMON BRAND NAME(S): AVSOLA, INFLECTRA, Remicade, RENFLEXIS  What should I tell my health care provider before I take this medicine?  They need to know if you have any of these conditions:  · cancer  · current or past resident of [place]  · diabetes  · exposure to tuberculosis  · Guillain-[place] syndrome  · heart failure  · hepatitis or liver disease  · immune system problems  · infection  · lung or breathing disease, like COPD  · multiple sclerosis  · receiving phototherapy for the skin  · seizure disorder  · an unusual or allergic reaction to infliximab, mouse proteins, other medicines, foods, dyes, or preservatives  · pregnant or trying to get pregnant  · breast-feeding  How should I use this medicine?  This medicine is for injection into a vein. It is usually given by a health care professional in a hospital or clinic setting.  A special MedGuide will be given to you by the pharmacist with each prescription and refill. Be sure to read this information carefully each time.  Talk to your pediatrician regarding the use of this medicine in children. While this drug may be prescribed for children as young as 6 years of age for selected conditions, precautions do apply.  Overdosage: If you think " you have taken too much of this medicine contact a poison control center or emergency room at once.  NOTE: This medicine is only for you. Do not share this medicine with others.  What if I miss a dose?  It is important not to miss your dose. Call your doctor or health care professional if you are unable to keep an appointment.  What may interact with this medicine?  Do not take this medicine with any of the following medications:  · biologic medicines such as abatacept, adalimumab, anakinra, certolizumab, etanercept, golimumab, rituximab, secukinumab, tocilizumab, tofactinib, ustekinumab  · live vaccines  This list may not describe all possible interactions. Give your health care provider a list of all the medicines, herbs, non-prescription drugs, or dietary supplements you use. Also tell them if you smoke, drink alcohol, or use illegal drugs. Some items may interact with your medicine.  What should I watch for while using this medicine?  Your condition will be monitored carefully while you are receiving this medicine. Visit your doctor or health care professional for regular checks on your progress. You may need blood work done while you are taking this medicine. Before beginning therapy, your doctor may do a test to see if you have been exposed to tuberculosis.  Call your doctor or health care professional for advice if you get a fever, chills or sore throat, or other symptoms of a cold or flu. Do not treat yourself. This drug decreases your body's ability to fight infections. Try to avoid being around people who are sick.  This medicine may make the symptoms of heart failure worse in some patients. If you notice symptoms such as increased shortness of breath or swelling of the ankles or legs, contact your health care provider right away.  If you are going to have surgery or dental work, tell your health care professional or dentist that you have received this medicine.  If you take this medicine for plaque  psoriasis, stay out of the sun. If you cannot avoid being in the sun, wear protective clothing and use sunscreen. Do not use sun lamps or tanning beds/booths.  Talk to your doctor about your risk of cancer. You may be more at risk for certain types of cancers if you take this medicine.  What side effects may I notice from receiving this medicine?  Side effects that you should report to your doctor or health care professional as soon as possible:  · allergic reactions like skin rash, itching or hives, swelling of the face, lips, or tongue  · breathing problems  · changes in vision  · chest pain  · fever or chills, usually related to the infusion  · joint pain  · pain, tingling, numbness in the hands or feet  · redness, blistering, peeling or loosening of the skin, including inside the mouth  · seizures  · signs of infection - fever or chills, cough, sore throat, flu-like symptoms, pain or difficulty passing urine  · signs and symptoms of liver injury like dark yellow or brown urine; general ill feeling; light-colored stools; loss of appetite; nausea; right upper belly pain; unusually weak or tired; yellowing of the eyes or skin  · signs and symptoms of a stroke like changes in vision; confusion; trouble speaking or understanding; severe headaches; sudden numbness or weakness of the face, arm or leg; trouble walking; dizziness; loss of balance or coordination  · swelling of the ankles, feet, or hands  · swollen lymph nodes in the neck, underarm, or groin areas  · unusual bleeding or bruising  · unusually weak or tired  Side effects that usually do not require medical attention (report to your doctor or health care professional if they continue or are bothersome):  · headache  · nausea  · stomach pain  · upset stomach  This list may not describe all possible side effects. Call your doctor for medical advice about side effects. You may report side effects to FDA at 5-499-IHI-8618.  Where should I keep my medicine?  This  drug is given in a hospital or clinic and will not be stored at home.  NOTE: This sheet is a summary. It may not cover all possible information. If you have questions about this medicine, talk to your doctor, pharmacist, or health care provider.  © 2020 [employer]vier/Gold Standard (2018-01-17 13:45:32)

## 2020-09-16 NOTE — NURSING NOTE
1102  Pt discharged with AVS discharge instructions.  Pt received all of Remicade infusion without any adverse reactions

## 2020-10-06 ENCOUNTER — TELEPHONE (OUTPATIENT)
Dept: INTERNAL MEDICINE | Facility: CLINIC | Age: 70
End: 2020-10-06

## 2020-10-06 ENCOUNTER — TRANSCRIBE ORDERS (OUTPATIENT)
Dept: ADMINISTRATIVE | Facility: HOSPITAL | Age: 70
End: 2020-10-06

## 2020-10-06 DIAGNOSIS — Z12.31 VISIT FOR SCREENING MAMMOGRAM: Primary | ICD-10-CM

## 2020-10-06 DIAGNOSIS — Z79.899 HIGH RISK MEDICATION USE: Primary | ICD-10-CM

## 2020-10-06 DIAGNOSIS — M81.0 SENILE OSTEOPOROSIS: ICD-10-CM

## 2020-10-06 NOTE — TELEPHONE ENCOUNTER
ROSA FROM [facility] CALLED STATING PATIENT WOULD LIKE TO HAVE HER LABS DONE FOR HER PROLIA SHOT IN OUR OFFICE, SO SHE IS ONLY STUCK ONCE. ROSA EMPHASIZED THAT SHE NEEDS THE MAG, CALCIUM, AND PHOSP.

## 2020-10-07 DIAGNOSIS — E03.9 HYPOTHYROIDISM, UNSPECIFIED TYPE: Primary | ICD-10-CM

## 2020-10-07 DIAGNOSIS — E78.5 HYPERLIPIDEMIA, UNSPECIFIED HYPERLIPIDEMIA TYPE: ICD-10-CM

## 2020-10-07 DIAGNOSIS — M81.0 AGE-RELATED OSTEOPOROSIS WITHOUT CURRENT PATHOLOGICAL FRACTURE: ICD-10-CM

## 2020-10-08 ENCOUNTER — LAB (OUTPATIENT)
Dept: INTERNAL MEDICINE | Facility: CLINIC | Age: 70
End: 2020-10-08

## 2020-10-08 DIAGNOSIS — M81.0 SENILE OSTEOPOROSIS: Primary | ICD-10-CM

## 2020-10-08 DIAGNOSIS — E78.5 HYPERLIPIDEMIA, UNSPECIFIED HYPERLIPIDEMIA TYPE: ICD-10-CM

## 2020-10-08 DIAGNOSIS — M81.0 AGE-RELATED OSTEOPOROSIS WITHOUT CURRENT PATHOLOGICAL FRACTURE: ICD-10-CM

## 2020-10-08 DIAGNOSIS — E03.9 HYPOTHYROIDISM, UNSPECIFIED TYPE: ICD-10-CM

## 2020-10-08 DIAGNOSIS — Z79.899 HIGH RISK MEDICATION USE: ICD-10-CM

## 2020-10-09 ENCOUNTER — TELEPHONE (OUTPATIENT)
Dept: GASTROENTEROLOGY | Facility: CLINIC | Age: 70
End: 2020-10-09

## 2020-10-09 LAB
ALBUMIN SERPL-MCNC: 4.3 G/DL (ref 3.5–5.2)
ALBUMIN/GLOB SERPL: 1.2 G/DL
ALP SERPL-CCNC: 65 U/L (ref 39–117)
ALT SERPL-CCNC: 19 U/L (ref 1–33)
AST SERPL-CCNC: 24 U/L (ref 1–32)
BASOPHILS # BLD AUTO: 0.01 10*3/MM3 (ref 0–0.2)
BASOPHILS NFR BLD AUTO: 0.2 % (ref 0–1.5)
BILIRUB SERPL-MCNC: 0.6 MG/DL (ref 0–1.2)
BUN SERPL-MCNC: 10 MG/DL (ref 8–23)
BUN/CREAT SERPL: 14.5 (ref 7–25)
CALCIUM SERPL-MCNC: 9.2 MG/DL (ref 8.6–10.5)
CHLORIDE SERPL-SCNC: 97 MMOL/L (ref 98–107)
CHOLEST SERPL-MCNC: 233 MG/DL (ref 0–200)
CHOLEST/HDLC SERPL: 3.82 {RATIO}
CO2 SERPL-SCNC: 26.2 MMOL/L (ref 22–29)
CREAT SERPL-MCNC: 0.69 MG/DL (ref 0.57–1)
EOSINOPHIL # BLD AUTO: 0.24 10*3/MM3 (ref 0–0.4)
EOSINOPHIL NFR BLD AUTO: 6 % (ref 0.3–6.2)
ERYTHROCYTE [DISTWIDTH] IN BLOOD BY AUTOMATED COUNT: 12.4 % (ref 12.3–15.4)
GLOBULIN SER CALC-MCNC: 3.6 GM/DL
GLUCOSE SERPL-MCNC: 93 MG/DL (ref 65–99)
HCT VFR BLD AUTO: 39.3 % (ref 34–46.6)
HDLC SERPL-MCNC: 61 MG/DL (ref 40–60)
HGB BLD-MCNC: 13.2 G/DL (ref 12–15.9)
IMM GRANULOCYTES # BLD AUTO: 0.01 10*3/MM3 (ref 0–0.05)
IMM GRANULOCYTES NFR BLD AUTO: 0.2 % (ref 0–0.5)
LDLC SERPL CALC-MCNC: 140 MG/DL (ref 0–100)
LYMPHOCYTES # BLD AUTO: 1.33 10*3/MM3 (ref 0.7–3.1)
LYMPHOCYTES NFR BLD AUTO: 33 % (ref 19.6–45.3)
MAGNESIUM SERPL-MCNC: 2.1 MG/DL (ref 1.6–2.4)
MCH RBC QN AUTO: 32 PG (ref 26.6–33)
MCHC RBC AUTO-ENTMCNC: 33.6 G/DL (ref 31.5–35.7)
MCV RBC AUTO: 95.4 FL (ref 79–97)
MONOCYTES # BLD AUTO: 0.25 10*3/MM3 (ref 0.1–0.9)
MONOCYTES NFR BLD AUTO: 6.2 % (ref 5–12)
NEUTROPHILS # BLD AUTO: 2.19 10*3/MM3 (ref 1.7–7)
NEUTROPHILS NFR BLD AUTO: 54.4 % (ref 42.7–76)
NRBC BLD AUTO-RTO: 0 /100 WBC (ref 0–0.2)
PHOSPHATE SERPL-MCNC: 4.1 MG/DL (ref 2.5–4.5)
PLATELET # BLD AUTO: 262 10*3/MM3 (ref 140–450)
POTASSIUM SERPL-SCNC: 4.7 MMOL/L (ref 3.5–5.2)
PROT SERPL-MCNC: 7.9 G/DL (ref 6–8.5)
RBC # BLD AUTO: 4.12 10*6/MM3 (ref 3.77–5.28)
SODIUM SERPL-SCNC: 132 MMOL/L (ref 136–145)
T4 FREE SERPL-MCNC: 1.56 NG/DL (ref 0.93–1.7)
TRIGL SERPL-MCNC: 180 MG/DL (ref 0–150)
TSH SERPL DL<=0.005 MIU/L-ACNC: 0.4 UIU/ML (ref 0.27–4.2)
VLDLC SERPL CALC-MCNC: 32 MG/DL (ref 5–40)
WBC # BLD AUTO: 4.03 10*3/MM3 (ref 3.4–10.8)

## 2020-10-13 ENCOUNTER — TELEPHONE (OUTPATIENT)
Dept: INTERNAL MEDICINE | Facility: CLINIC | Age: 70
End: 2020-10-13

## 2020-10-14 ENCOUNTER — TELEPHONE (OUTPATIENT)
Dept: INTERNAL MEDICINE | Facility: CLINIC | Age: 70
End: 2020-10-14

## 2020-10-14 ENCOUNTER — HOSPITAL ENCOUNTER (OUTPATIENT)
Dept: INFUSION THERAPY | Facility: HOSPITAL | Age: 70
Discharge: HOME | End: 2020-10-14
Admitting: FAMILY MEDICINE

## 2020-10-14 VITALS
DIASTOLIC BLOOD PRESSURE: 83 MMHG | TEMPERATURE: 97.7 F | HEART RATE: 81 BPM | OXYGEN SATURATION: 94 % | RESPIRATION RATE: 18 BRPM | SYSTOLIC BLOOD PRESSURE: 129 MMHG

## 2020-10-14 DIAGNOSIS — M81.0 OSTEOPOROSIS WITHOUT PATHOLOGICAL FRACTURE: Primary | ICD-10-CM

## 2020-10-14 DIAGNOSIS — M81.0 AGE-RELATED OSTEOPOROSIS WITHOUT CURRENT PATHOLOGICAL FRACTURE: Primary | ICD-10-CM

## 2020-10-14 DIAGNOSIS — M81.0 AGE-RELATED OSTEOPOROSIS WITHOUT CURRENT PATHOLOGICAL FRACTURE: ICD-10-CM

## 2020-10-14 PROCEDURE — 96372 THER/PROPH/DIAG INJ SC/IM: CPT

## 2020-10-14 PROCEDURE — 25010000002 DENOSUMAB 60 MG/ML SOLUTION PREFILLED SYRINGE: Performed by: FAMILY MEDICINE

## 2020-10-14 RX ADMIN — DENOSUMAB 60 MG: 60 INJECTION SUBCUTANEOUS at 10:05

## 2020-10-14 NOTE — TELEPHONE ENCOUNTER
[facility] phones requesting treatment plan for patient to have Prolia this morning at 10 am.

## 2020-10-14 NOTE — PATIENT INSTRUCTIONS
"Call [facility] at [phone] if you have any problems or concerns.    We know you have a Choice in healthcare and appreciate you using [facility].  Our purpose is to provide you \"Excellent Care\".  We hope that you will always choose us in the future and continue to recommend us to your family and friends.    Denosumab injection  What is this medicine?  DENOSUMAB (den oh rosalio mab) slows bone breakdown. Prolia is used to treat osteoporosis in women after menopause and in men, and in people who are taking corticosteroids for 6 months or more. Xgeva is used to treat a high calcium level due to cancer and to prevent bone fractures and other bone problems caused by multiple myeloma or cancer bone metastases. Xgeva is also used to treat giant cell tumor of the bone.  This medicine may be used for other purposes; ask your health care provider or pharmacist if you have questions.  COMMON BRAND NAME(S): Prolia, XGEVA  What should I tell my health care provider before I take this medicine?  They need to know if you have any of these conditions:  · dental disease  · having surgery or tooth extraction  · infection  · kidney disease  · low levels of calcium or Vitamin D in the blood  · malnutrition  · on hemodialysis  · skin conditions or sensitivity  · thyroid or parathyroid disease  · an unusual reaction to denosumab, other medicines, foods, dyes, or preservatives  · pregnant or trying to get pregnant  · breast-feeding  How should I use this medicine?  This medicine is for injection under the skin. It is given by a health care professional in a hospital or clinic setting.  A special MedGuide will be given to you before each treatment. Be sure to read this information carefully each time.  For Prolia, talk to your pediatrician regarding the use of this medicine in children. Special care may be needed. For Xgeva, talk to your pediatrician regarding the use of this medicine in children. " While this drug may be prescribed for children as young as 13 years for selected conditions, precautions do apply.  Overdosage: If you think you have taken too much of this medicine contact a poison control center or emergency room at once.  NOTE: This medicine is only for you. Do not share this medicine with others.  What if I miss a dose?  It is important not to miss your dose. Call your doctor or health care professional if you are unable to keep an appointment.  What may interact with this medicine?  Do not take this medicine with any of the following medications:  · other medicines containing denosumab  This medicine may also interact with the following medications:  · medicines that lower your chance of fighting infection  · steroid medicines like prednisone or cortisone  This list may not describe all possible interactions. Give your health care provider a list of all the medicines, herbs, non-prescription drugs, or dietary supplements you use. Also tell them if you smoke, drink alcohol, or use illegal drugs. Some items may interact with your medicine.  What should I watch for while using this medicine?  Visit your doctor or health care professional for regular checks on your progress. Your doctor or health care professional may order blood tests and other tests to see how you are doing.  Call your doctor or health care professional for advice if you get a fever, chills or sore throat, or other symptoms of a cold or flu. Do not treat yourself. This drug may decrease your body's ability to fight infection. Try to avoid being around people who are sick.  You should make sure you get enough calcium and vitamin D while you are taking this medicine, unless your doctor tells you not to. Discuss the foods you eat and the vitamins you take with your health care professional.  See your dentist regularly. Brush and floss your teeth as directed. Before you have any dental work done, tell your dentist you are receiving  this medicine.  Do not become pregnant while taking this medicine or for 5 months after stopping it. Talk with your doctor or health care professional about your birth control options while taking this medicine. Women should inform their doctor if they wish to become pregnant or think they might be pregnant. There is a potential for serious side effects to an unborn child. Talk to your health care professional or pharmacist for more information.  What side effects may I notice from receiving this medicine?  Side effects that you should report to your doctor or health care professional as soon as possible:  · allergic reactions like skin rash, itching or hives, swelling of the face, lips, or tongue  · bone pain  · breathing problems  · dizziness  · jaw pain, especially after dental work  · redness, blistering, peeling of the skin  · signs and symptoms of infection like fever or chills; cough; sore throat; pain or trouble passing urine  · signs of low calcium like fast heartbeat, muscle cramps or muscle pain; pain, tingling, numbness in the hands or feet; seizures  · unusual bleeding or bruising  · unusually weak or tired  Side effects that usually do not require medical attention (report to your doctor or health care professional if they continue or are bothersome):  · constipation  · diarrhea  · headache  · joint pain  · loss of appetite  · muscle pain  · runny nose  · tiredness  · upset stomach  This list may not describe all possible side effects. Call your doctor for medical advice about side effects. You may report side effects to FDA at 1-419-FDA-6962.  Where should I keep my medicine?  This medicine is only given in a clinic, doctor's office, or other health care setting and will not be stored at home.  NOTE: This sheet is a summary. It may not cover all possible information. If you have questions about this medicine, talk to your doctor, pharmacist, or health care provider.  © 2020 [employer]vier/Gold Standard  (2019-04-26 16:10:44)

## 2020-10-15 ENCOUNTER — TELEMEDICINE (OUTPATIENT)
Dept: INTERNAL MEDICINE | Facility: CLINIC | Age: 70
End: 2020-10-15

## 2020-10-15 DIAGNOSIS — E78.5 HYPERLIPIDEMIA, UNSPECIFIED HYPERLIPIDEMIA TYPE: Primary | ICD-10-CM

## 2020-10-15 DIAGNOSIS — M05.79 RHEUMATOID ARTHRITIS INVOLVING MULTIPLE SITES WITH POSITIVE RHEUMATOID FACTOR (HCC): ICD-10-CM

## 2020-10-15 DIAGNOSIS — M81.0 AGE-RELATED OSTEOPOROSIS WITHOUT CURRENT PATHOLOGICAL FRACTURE: ICD-10-CM

## 2020-10-15 DIAGNOSIS — Z00.00 ROUTINE HEALTH MAINTENANCE: ICD-10-CM

## 2020-10-15 DIAGNOSIS — E03.9 HYPOTHYROIDISM, UNSPECIFIED TYPE: ICD-10-CM

## 2020-10-15 DIAGNOSIS — G45.9 TIA (TRANSIENT ISCHEMIC ATTACK): ICD-10-CM

## 2020-10-15 DIAGNOSIS — I45.6 WOLFF-PARKINSON-WHITE (WPW) PATTERN: ICD-10-CM

## 2020-10-15 DIAGNOSIS — F32.A DEPRESSION, UNSPECIFIED DEPRESSION TYPE: ICD-10-CM

## 2020-10-15 DIAGNOSIS — K51.011 ULCERATIVE PANCOLITIS WITH RECTAL BLEEDING (HCC): ICD-10-CM

## 2020-10-15 PROCEDURE — 99214 OFFICE O/P EST MOD 30 MIN: CPT | Performed by: FAMILY MEDICINE

## 2020-10-15 NOTE — PROGRESS NOTES
Subjective     Yelena Rangel is a 70 y.o. female, who presents with a chief complaint of   Chief Complaint   Patient presents with   • Hyperlipidemia   • Hypothyroidism   • Depression     This visit was done by video.  Total discussion time was 15 minutes.    Hyperlipidemia  Exacerbating diseases include hypothyroidism.   Hypothyroidism  Associated symptoms include arthralgias.   Depression    1. Hyperlipidemia.  Doesn't tolerate statins.  She has been exercising regularly.    2. Ulcerative colitis.  On Remicade every 2 months per Dr. Saldaña.  She is still feeling well.    3. Depression.  Pt reports she is still doing well with venlafaxine.    4. Rheumatoid arthritis. This is also well-controlled with the Remicade.    The following portions of the patient's history were reviewed and updated as appropriate: allergies, current medications, past family history, past medical history, past social history, past surgical history and problem list.    Allergies: Sulfa antibiotics    Review of Systems   Constitutional: Negative.    HENT: Negative.    Eyes: Negative.    Respiratory: Negative.    Cardiovascular: Negative.    Gastrointestinal: Negative.    Endocrine: Negative.    Genitourinary: Negative.    Musculoskeletal: Positive for arthralgias.   Skin: Negative.    Allergic/Immunologic: Positive for environmental allergies.   Neurological: Negative.    Hematological: Negative.    Psychiatric/Behavioral: Negative.        Objective     Wt Readings from Last 3 Encounters:   09/16/20 76.7 kg (169 lb)   07/22/20 75.8 kg (167 lb)   06/24/20 76.7 kg (169 lb)     Temp Readings from Last 3 Encounters:   10/14/20 97.7 °F (36.5 °C) (Oral)   09/16/20 97.5 °F (36.4 °C) (Oral)   07/22/20 97.3 °F (36.3 °C)     BP Readings from Last 3 Encounters:   10/14/20 129/83   09/16/20 136/94   07/22/20 145/86     Pulse Readings from Last 3 Encounters:   10/14/20 81   09/16/20 82   07/22/20 70     There is no height or weight on file to calculate  BMI.  SpO2 Readings from Last 3 Encounters:   09/27/17 98%   03/29/17 98%   09/28/16 97%       Physical Exam   Constitutional: She is oriented to person, place, and time. She appears well-developed. No distress.   HENT:   Head: Normocephalic and atraumatic.   Eyes: Conjunctivae are normal.   Neck: Neck supple. No thyromegaly present.   Pulmonary/Chest: Effort normal. No respiratory distress.   Neurological: She is alert and oriented to person, place, and time.   Skin: No erythema. No pallor.   Psychiatric: Her behavior is normal. Mood normal.   Nursing note and vitals reviewed.      Results for orders placed or performed in visit on 10/08/20   T4, Free    Specimen: Blood   Result Value Ref Range    Free T4 1.56 0.93 - 1.70 ng/dL   TSH    Specimen: Blood   Result Value Ref Range    TSH 0.401 0.270 - 4.200 uIU/mL   Lipid Panel With / Chol / HDL Ratio    Specimen: Blood   Result Value Ref Range    Total Cholesterol 233 (H) 0 - 200 mg/dL    Triglycerides 180 (H) 0 - 150 mg/dL    HDL Cholesterol 61 (H) 40 - 60 mg/dL    VLDL Cholesterol Jose 32 5 - 40 mg/dL    LDL Chol Calc (NIH) 140 (H) 0 - 100 mg/dL    Chol/HDL Ratio 3.82    Comprehensive Metabolic Panel    Specimen: Blood   Result Value Ref Range    Glucose 93 65 - 99 mg/dL    BUN 10 8 - 23 mg/dL    Creatinine 0.69 0.57 - 1.00 mg/dL    eGFR Non African Am 85 >60 mL/min/1.73    eGFR African Am 103 >60 mL/min/1.73    BUN/Creatinine Ratio 14.5 7.0 - 25.0    Sodium 132 (L) 136 - 145 mmol/L    Potassium 4.7 3.5 - 5.2 mmol/L    Chloride 97 (L) 98 - 107 mmol/L    Total CO2 26.2 22.0 - 29.0 mmol/L    Calcium 9.2 8.6 - 10.5 mg/dL    Total Protein 7.9 6.0 - 8.5 g/dL    Albumin 4.30 3.50 - 5.20 g/dL    Globulin 3.6 gm/dL    A/G Ratio 1.2 g/dL    Total Bilirubin 0.6 0.0 - 1.2 mg/dL    Alkaline Phosphatase 65 39 - 117 U/L    AST (SGOT) 24 1 - 32 U/L    ALT (SGPT) 19 1 - 33 U/L   Phosphorus   Result Value Ref Range    Phosphorus 4.1 2.5 - 4.5 mg/dL   Magnesium   Result Value Ref  Range    Magnesium 2.1 1.6 - 2.4 mg/dL   CBC & Differential    Specimen: Blood   Result Value Ref Range    WBC 4.03 3.40 - 10.80 10*3/mm3    RBC 4.12 3.77 - 5.28 10*6/mm3    Hemoglobin 13.2 12.0 - 15.9 g/dL    Hematocrit 39.3 34.0 - 46.6 %    MCV 95.4 79.0 - 97.0 fL    MCH 32.0 26.6 - 33.0 pg    MCHC 33.6 31.5 - 35.7 g/dL    RDW 12.4 12.3 - 15.4 %    Platelets 262 140 - 450 10*3/mm3    Neutrophil Rel % 54.4 42.7 - 76.0 %    Lymphocyte Rel % 33.0 19.6 - 45.3 %    Monocyte Rel % 6.2 5.0 - 12.0 %    Eosinophil Rel % 6.0 0.3 - 6.2 %    Basophil Rel % 0.2 0.0 - 1.5 %    Neutrophils Absolute 2.19 1.70 - 7.00 10*3/mm3    Lymphocytes Absolute 1.33 0.70 - 3.10 10*3/mm3    Monocytes Absolute 0.25 0.10 - 0.90 10*3/mm3    Eosinophils Absolute 0.24 0.00 - 0.40 10*3/mm3    Basophils Absolute 0.01 0.00 - 0.20 10*3/mm3    Immature Granulocyte Rel % 0.2 0.0 - 0.5 %    Immature Grans Absolute 0.01 0.00 - 0.05 10*3/mm3    nRBC 0.0 0.0 - 0.2 /100 WBC       Assessment/Plan   Diagnoses and all orders for this visit:    1. Hyperlipidemia, unspecified hyperlipidemia type (Primary)    2. Hypothyroidism, unspecified type    3. Depression, unspecified depression type    4. Age-related osteoporosis without current pathological fracture    5. Ulcerative pancolitis with rectal bleeding (CMS/HCC)    6. Liseth-Parkinson-White (WPW) pattern    7. Rheumatoid arthritis involving multiple sites with positive rheumatoid factor (CMS/HCC)    8. TIA (transient ischemic attack)    9. Routine health maintenance    1. Hyperlipidemia.  Off statin.  Continue lifestyle measures.     2. Hypothyroidism.  Adequately replaced. Labs reviewed.    3. Depression.  Controlled.  Continue venlafaxine and lifestyle measures.     4. Osteoporosis.  Continue Prolia.    5. Ulcerative colitis.  Well-controlled.  Continue per Dr. Saldaña.    6. Liseth-Parkinson-Dylan.  She saw cardiology before her knee replacements.    7. RA.  Controlled with Remicade.    8. TIA.  Continue ASA  81 mg.  She does not tolerate statins.  Considering Zetia.    9. Routine health maint.  Prevnar and Pneumovax UTD.  Mammogram pending next month.  Flu vaccine done.  Had both doses of Shingrix at pharmacy.      Outpatient Medications Prior to Visit   Medication Sig Dispense Refill   • Acetaminophen (TYLENOL ARTHRITIS PAIN PO) Take 1 tablet by mouth 3 (Three) Times a Day. AS NEEDED FOR ARTHRITIS     • aspirin 81 MG chewable tablet Chew 81 mg Daily.     • Calcium Carb-Cholecalciferol (CALCIUM PLUS VITAMIN D3 PO) Take 1,400 mg by mouth Daily. D3 = 1500 IU     • cetirizine (ZyrTEC) 10 MG tablet Take 10 mg by mouth daily.     • denosumab (Prolia) 60 MG/ML solution prefilled syringe syringe Inject  under the skin into the appropriate area as directed 1 (One) Time.     • InFLIXimab (REMICADE IV) Infuse 1 dose into a venous catheter Every 30 (Thirty) Days. Every 60 days.  Dr. Saldaña.     • levothyroxine (SYNTHROID, LEVOTHROID) 100 MCG tablet TAKE ONE TABLET BY MOUTH DAILY 90 tablet 1   • Multiple Vitamins-Minerals (MULTIVITAMIN WITH MINERALS) tablet tablet Take 1 tablet by mouth Daily.     • omeprazole (priLOSEC) 20 MG capsule TAKE ONE CAPSULE BY MOUTH DAILY 90 capsule 0   • Probiotic Product (TRUNATURE DIGESTIVE PROBIOTIC PO) Take  by mouth Daily.     • venlafaxine XR (EFFEXOR-XR) 75 MG 24 hr capsule TAKE ONE CAPSULE BY MOUTH DAILY 90 capsule 1     No facility-administered medications prior to visit.      No orders of the defined types were placed in this encounter.    [unfilled]  There are no discontinued medications.    Return in about 6 months (around 4/15/2021).

## 2020-11-06 ENCOUNTER — HOSPITAL ENCOUNTER (OUTPATIENT)
Dept: MAMMOGRAPHY | Facility: HOSPITAL | Age: 70
Discharge: HOME | End: 2020-11-06
Admitting: FAMILY MEDICINE

## 2020-11-06 DIAGNOSIS — Z12.31 VISIT FOR SCREENING MAMMOGRAM: ICD-10-CM

## 2020-11-06 PROCEDURE — 77063 BREAST TOMOSYNTHESIS BI: CPT

## 2020-11-06 PROCEDURE — 77067 SCR MAMMO BI INCL CAD: CPT

## 2020-11-11 ENCOUNTER — HOSPITAL ENCOUNTER (OUTPATIENT)
Dept: INFUSION THERAPY | Facility: HOSPITAL | Age: 70
Discharge: HOME | End: 2020-11-11
Admitting: INTERNAL MEDICINE

## 2020-11-11 VITALS
RESPIRATION RATE: 16 BRPM | BODY MASS INDEX: 30.9 KG/M2 | OXYGEN SATURATION: 94 % | WEIGHT: 169 LBS | TEMPERATURE: 97.7 F | DIASTOLIC BLOOD PRESSURE: 93 MMHG | HEART RATE: 70 BPM | SYSTOLIC BLOOD PRESSURE: 146 MMHG

## 2020-11-11 DIAGNOSIS — K51.311 ULCERATIVE RECTOSIGMOIDITIS WITH RECTAL BLEEDING (HCC): ICD-10-CM

## 2020-11-11 DIAGNOSIS — K51.20 ULCERATIVE PROCTITIS WITHOUT COMPLICATION (HCC): Primary | ICD-10-CM

## 2020-11-11 PROCEDURE — 96415 CHEMO IV INFUSION ADDL HR: CPT

## 2020-11-11 PROCEDURE — 96413 CHEMO IV INFUSION 1 HR: CPT

## 2020-11-11 PROCEDURE — 25010000002 INFLIXIMAB PER 10 MG: Performed by: INTERNAL MEDICINE

## 2020-11-11 RX ADMIN — INFLIXIMAB 760 MG: 100 INJECTION, POWDER, LYOPHILIZED, FOR SOLUTION INTRAVENOUS at 08:49

## 2020-11-11 NOTE — PATIENT INSTRUCTIONS
"Call Dr. Dl Saldaña, Gastroenterology at [phone] if you have any problems or concerns.    We know you have a Choice in healthcare and appreciate you using [facility].  Our purpose is to provide you \"Excellent Care\".  We hope that you will always choose us in the future and continue to recommend us to your family and friends.    Infliximab injection  What is this medicine?  INFLIXIMAB (in FLIX i mab) is used to treat Crohn's disease and ulcerative colitis. It is also used to treat ankylosing spondylitis, plaque psoriasis, and some forms of arthritis.  This medicine may be used for other purposes; ask your health care provider or pharmacist if you have questions.  COMMON BRAND NAME(S): AVSOLA, INFLECTRA, Remicade, RENFLEXIS  What should I tell my health care provider before I take this medicine?  They need to know if you have any of these conditions:  · cancer  · current or past resident of [place]  · diabetes  · exposure to tuberculosis  · Guillain-[place] syndrome  · heart failure  · hepatitis or liver disease  · immune system problems  · infection  · lung or breathing disease, like COPD  · multiple sclerosis  · receiving phototherapy for the skin  · seizure disorder  · an unusual or allergic reaction to infliximab, mouse proteins, other medicines, foods, dyes, or preservatives  · pregnant or trying to get pregnant  · breast-feeding  How should I use this medicine?  This medicine is for injection into a vein. It is usually given by a health care professional in a hospital or clinic setting.  A special MedGuide will be given to you by the pharmacist with each prescription and refill. Be sure to read this information carefully each time.  Talk to your pediatrician regarding the use of this medicine in children. While this drug may be prescribed for children as young as 6 years of age for selected conditions, precautions do apply.  Overdosage: If you think you have " taken too much of this medicine contact a poison control center or emergency room at once.  NOTE: This medicine is only for you. Do not share this medicine with others.  What if I miss a dose?  It is important not to miss your dose. Call your doctor or health care professional if you are unable to keep an appointment.  What may interact with this medicine?  Do not take this medicine with any of the following medications:  · biologic medicines such as abatacept, adalimumab, anakinra, certolizumab, etanercept, golimumab, rituximab, secukinumab, tocilizumab, tofactinib, ustekinumab  · live vaccines  This list may not describe all possible interactions. Give your health care provider a list of all the medicines, herbs, non-prescription drugs, or dietary supplements you use. Also tell them if you smoke, drink alcohol, or use illegal drugs. Some items may interact with your medicine.  What should I watch for while using this medicine?  Your condition will be monitored carefully while you are receiving this medicine. Visit your doctor or health care professional for regular checks on your progress. You may need blood work done while you are taking this medicine. Before beginning therapy, your doctor may do a test to see if you have been exposed to tuberculosis.  Call your doctor or health care professional for advice if you get a fever, chills or sore throat, or other symptoms of a cold or flu. Do not treat yourself. This drug decreases your body's ability to fight infections. Try to avoid being around people who are sick.  This medicine may make the symptoms of heart failure worse in some patients. If you notice symptoms such as increased shortness of breath or swelling of the ankles or legs, contact your health care provider right away.  If you are going to have surgery or dental work, tell your health care professional or dentist that you have received this medicine.  If you take this medicine for plaque psoriasis, stay  out of the sun. If you cannot avoid being in the sun, wear protective clothing and use sunscreen. Do not use sun lamps or tanning beds/booths.  Talk to your doctor about your risk of cancer. You may be more at risk for certain types of cancers if you take this medicine.  What side effects may I notice from receiving this medicine?  Side effects that you should report to your doctor or health care professional as soon as possible:  · allergic reactions like skin rash, itching or hives, swelling of the face, lips, or tongue  · breathing problems  · changes in vision  · chest pain  · fever or chills, usually related to the infusion  · joint pain  · pain, tingling, numbness in the hands or feet  · redness, blistering, peeling or loosening of the skin, including inside the mouth  · seizures  · signs of infection - fever or chills, cough, sore throat, flu-like symptoms, pain or difficulty passing urine  · signs and symptoms of liver injury like dark yellow or brown urine; general ill feeling; light-colored stools; loss of appetite; nausea; right upper belly pain; unusually weak or tired; yellowing of the eyes or skin  · signs and symptoms of a stroke like changes in vision; confusion; trouble speaking or understanding; severe headaches; sudden numbness or weakness of the face, arm or leg; trouble walking; dizziness; loss of balance or coordination  · swelling of the ankles, feet, or hands  · swollen lymph nodes in the neck, underarm, or groin areas  · unusual bleeding or bruising  · unusually weak or tired  Side effects that usually do not require medical attention (report to your doctor or health care professional if they continue or are bothersome):  · headache  · nausea  · stomach pain  · upset stomach  This list may not describe all possible side effects. Call your doctor for medical advice about side effects. You may report side effects to FDA at 9-350-FDA-2448.  Where should I keep my medicine?  This drug is given  in a hospital or clinic and will not be stored at home.  NOTE: This sheet is a summary. It may not cover all possible information. If you have questions about this medicine, talk to your doctor, pharmacist, or health care provider.  © 2020 [employer]/Gold Standard (2018-01-17 13:45:32)

## 2020-11-11 NOTE — NURSING NOTE
0805  Pt here for Remicade infusion.  1108  Pt discharged ambulatory, VSS, Sergey infusion without any adverse effects.  Next appt scheduled and pt given only verbal instructions.  Does not want AVS today.

## 2020-11-30 RX ORDER — OMEPRAZOLE 20 MG/1
CAPSULE, DELAYED RELEASE ORAL
Qty: 90 CAPSULE | Refills: 0 | Status: SHIPPED | OUTPATIENT
Start: 2020-11-30 | End: 2021-03-01

## 2020-12-23 ENCOUNTER — OFFICE VISIT (OUTPATIENT)
Dept: INTERNAL MEDICINE | Facility: CLINIC | Age: 70
End: 2020-12-23

## 2020-12-23 VITALS — HEIGHT: 62 IN | BODY MASS INDEX: 30.91 KG/M2 | RESPIRATION RATE: 16 BRPM

## 2020-12-23 DIAGNOSIS — Z00.00 MEDICARE ANNUAL WELLNESS VISIT, SUBSEQUENT: Primary | ICD-10-CM

## 2020-12-23 PROCEDURE — 96160 PT-FOCUSED HLTH RISK ASSMT: CPT | Performed by: FAMILY MEDICINE

## 2020-12-23 PROCEDURE — G0439 PPPS, SUBSEQ VISIT: HCPCS | Performed by: FAMILY MEDICINE

## 2020-12-23 PROCEDURE — 1126F AMNT PAIN NOTED NONE PRSNT: CPT | Performed by: FAMILY MEDICINE

## 2020-12-23 PROCEDURE — 1170F FXNL STATUS ASSESSED: CPT | Performed by: FAMILY MEDICINE

## 2020-12-23 NOTE — PROGRESS NOTES
The ABCs of the Annual Wellness Visit  Subsequent Medicare Wellness Visit    Chief Complaint   Patient presents with   • Medicare Wellness-subsequent       Subjective   History of Present Illness:  Yelena Rangel is a 70 y.o. female who presents for a Subsequent Medicare Wellness Visit.    HEALTH RISK ASSESSMENT    Recent Hospitalizations:  No hospitalization(s) within the last year.    Current Medical Providers:  Patient Care Team:  Juliano Mendez MD as PCP - General (Family Medicine)  Piotr, Dl Heath MD as Consulting Physician (Gastroenterology)  Simon Barber MD as Surgeon (Orthopedic Surgery)  Simon Barber MD as Surgeon (Orthopedic Surgery)    Smoking Status:  Social History     Tobacco Use   Smoking Status Never Smoker   Smokeless Tobacco Never Used       Alcohol Consumption:  Social History     Substance and Sexual Activity   Alcohol Use Yes   • Alcohol/week: 10.0 standard drinks   • Types: 10 Cans of beer per week    Comment: twice week//caffeine yes       Depression Screen:   PHQ-2/PHQ-9 Depression Screening 12/23/2020   Little interest or pleasure in doing things 0   Feeling down, depressed, or hopeless 0   Trouble falling or staying asleep, or sleeping too much 2   Feeling tired or having little energy 1   Poor appetite or overeating 0   Feeling bad about yourself - or that you are a failure or have let yourself or your family down 0   Trouble concentrating on things, such as reading the newspaper or watching television 0   Moving or speaking so slowly that other people could have noticed. Or the opposite - being so fidgety or restless that you have been moving around a lot more than usual 0   Thoughts that you would be better off dead, or of hurting yourself in some way 0   Total Score 3   If you checked off any problems, how difficult have these problems made it for you to do your work, take care of things at home, or get along with other people? Not difficult at all        Fall Risk Screen:  WEI Fall Risk Assessment has not been completed.    Health Habits and Functional and Cognitive Screening:  Functional & Cognitive Status 12/2/2019   Do you have difficulty preparing food and eating? No   Do you have difficulty bathing yourself, getting dressed or grooming yourself? No   Do you have difficulty using the toilet? No   Do you have difficulty moving around from place to place? No   Do you have trouble with steps or getting out of a bed or a chair? No   Current Diet Well Balanced Diet   Dental Exam Not up to date   Eye Exam Not up to date   Exercise (times per week) 2 times per week   Current Exercise Activities Include Stationary Bicycling/Spin Class   Do you need help using the phone?  No   Are you deaf or do you have serious difficulty hearing?  No   Do you need help with transportation? No   Do you need help shopping? No   Do you need help preparing meals?  No   Do you need help with housework?  No   Do you need help with laundry? No   Do you need help taking your medications? No   Do you need help managing money? No   Do you ever drive or ride in a car without wearing a seat belt? No   Have you felt unusual stress, anger or loneliness in the last month? No   Who do you live with? Spouse   If you need help, do you have trouble finding someone available to you? No   Have you been bothered in the last four weeks by sexual problems? No   Do you have difficulty concentrating, remembering or making decisions? No         Does the patient have evidence of cognitive impairment? No    Asprin use counseling:Taking ASA appropriately as indicated    Age-appropriate Screening Schedule:  Refer to the list below for future screening recommendations based on patient's age, sex and/or medical conditions. Orders for these recommended tests are listed in the plan section. The patient has been provided with a written plan.    Health Maintenance   Topic Date Due   • TDAP/TD VACCINES (1 - Tdap)  10/15/1969   • PAP SMEAR  09/28/2016   • INFLUENZA VACCINE  08/01/2020   • DXA SCAN  09/25/2021   • LIPID PANEL  10/08/2021   • MAMMOGRAM  11/06/2022   • COLONOSCOPY  05/13/2026   • ZOSTER VACCINE  Completed          The following portions of the patient's history were reviewed and updated as appropriate: allergies, current medications, past family history, past medical history, past social history, past surgical history and problem list.    Outpatient Medications Prior to Visit   Medication Sig Dispense Refill   • Acetaminophen (TYLENOL ARTHRITIS PAIN PO) Take 1 tablet by mouth 3 (Three) Times a Day. AS NEEDED FOR ARTHRITIS     • aspirin 81 MG chewable tablet Chew 81 mg Daily.     • Calcium Carb-Cholecalciferol (CALCIUM PLUS VITAMIN D3 PO) Take 1,400 mg by mouth Daily. D3 = 1500 IU     • cetirizine (ZyrTEC) 10 MG tablet Take 10 mg by mouth daily.     • denosumab (Prolia) 60 MG/ML solution prefilled syringe syringe Inject  under the skin into the appropriate area as directed 1 (One) Time.     • InFLIXimab (REMICADE IV) Infuse 1 dose into a venous catheter Every 30 (Thirty) Days. Every 60 days.  Dr. Saldaña.     • levothyroxine (SYNTHROID, LEVOTHROID) 100 MCG tablet TAKE ONE TABLET BY MOUTH DAILY 90 tablet 1   • Multiple Vitamins-Minerals (MULTIVITAMIN WITH MINERALS) tablet tablet Take 1 tablet by mouth Daily.     • omeprazole (priLOSEC) 20 MG capsule TAKE ONE CAPSULE BY MOUTH DAILY 90 capsule 0   • venlafaxine XR (EFFEXOR-XR) 75 MG 24 hr capsule TAKE ONE CAPSULE BY MOUTH DAILY 90 capsule 1   • Probiotic Product (TRUNATURE DIGESTIVE PROBIOTIC PO) Take  by mouth Daily.       No facility-administered medications prior to visit.        Patient Active Problem List   Diagnosis   • Primary osteoarthritis of knees, bilateral   • Depression   • Hyperlipidemia   • Hypothyroidism   • Age-related osteoporosis without current pathological fracture   • Liseth-Parkinson-White (WPW) pattern   • Ulcerative colitis (CMS/HCC)   •  "Allergic rhinitis   • Leukopenia   • TIA (transient ischemic attack)   • Rheumatoid arthritis involving multiple sites with positive rheumatoid factor (CMS/[facility])   • Routine health maintenance       Advanced Care Planning:  ACP discussion was held with the patient during this visit. Patient does not have an advance directive, information provided.    Review of Systems    Compared to one year ago, the patient feels her physical health is worse.  Compared to one year ago, the patient feels her mental health is the same.    Reviewed chart for potential of high risk medication in the elderly: yes  Reviewed chart for potential of harmful drug interactions in the elderly:yes    Objective         Vitals:    12/23/20 1002   Resp: 16   Height: 157.5 cm (62\")       Body mass index is 30.91 kg/m².  Discussed the patient's BMI with her. The BMI is above average; no BMI management plan is appropriate..    Physical Exam    Lab Results   Component Value Date    GLU 93 10/08/2020    CHLPL 233 (H) 10/08/2020    TRIG 180 (H) 10/08/2020    HDL 61 (H) 10/08/2020     (H) 10/08/2020    VLDL 32 10/08/2020        Assessment/Plan   Medicare Risks and Personalized Health Plan  CMS Preventative Services Quick Reference  Advance Directive Discussion  Immunizations Discussed/Encouraged (specific immunizations; UTD )    The above risks/problems have been discussed with the patient.  Pertinent information has been shared with the patient in the After Visit Summary.  Follow up plans and orders are seen below in the Assessment/Plan Section.    Diagnoses and all orders for this visit:    1. Medicare annual wellness visit, subsequent (Primary)      Follow Up:  No follow-ups on file.     An After Visit Summary and PPPS were given to the patient.       This visit was completed by video.  Total discussion time was 15 minutes.      "

## 2021-01-06 ENCOUNTER — HOSPITAL ENCOUNTER (OUTPATIENT)
Dept: INFUSION THERAPY | Facility: HOSPITAL | Age: 71
Discharge: HOME | End: 2021-01-06
Admitting: INTERNAL MEDICINE

## 2021-01-06 VITALS
RESPIRATION RATE: 16 BRPM | BODY MASS INDEX: 31.28 KG/M2 | TEMPERATURE: 97.6 F | WEIGHT: 171 LBS | SYSTOLIC BLOOD PRESSURE: 135 MMHG | HEART RATE: 68 BPM | DIASTOLIC BLOOD PRESSURE: 87 MMHG | OXYGEN SATURATION: 95 %

## 2021-01-06 DIAGNOSIS — K51.311 ULCERATIVE RECTOSIGMOIDITIS WITH RECTAL BLEEDING (HCC): ICD-10-CM

## 2021-01-06 DIAGNOSIS — K51.20 ULCERATIVE PROCTITIS WITHOUT COMPLICATION (HCC): Primary | ICD-10-CM

## 2021-01-06 PROCEDURE — 96413 CHEMO IV INFUSION 1 HR: CPT

## 2021-01-06 PROCEDURE — 25010000002 INFLIXIMAB PER 10 MG: Performed by: INTERNAL MEDICINE

## 2021-01-06 PROCEDURE — 96415 CHEMO IV INFUSION ADDL HR: CPT

## 2021-01-06 RX ADMIN — INFLIXIMAB 776 MG: 100 INJECTION, POWDER, LYOPHILIZED, FOR SOLUTION INTRAVENOUS at 09:02

## 2021-01-06 NOTE — NURSING NOTE
NURSING PROGRESS NOTE:    PATIENT ARRIVE TO [facility] FOR SCHEDULED INFUSION AT 0820 .  PROCEDURE WAS PERFORMED WITHOUT INCIDENT. DECLINED A COPY OF AVS.  NEXT APPOINTMENT SCHEDULED.  PATIENT DISCHARGED HOME AT 1120 . KAILYN LEE

## 2021-02-28 DIAGNOSIS — E03.9 HYPOTHYROIDISM, UNSPECIFIED TYPE: ICD-10-CM

## 2021-03-01 RX ORDER — OMEPRAZOLE 20 MG/1
CAPSULE, DELAYED RELEASE ORAL
Qty: 90 CAPSULE | Refills: 0 | Status: SHIPPED | OUTPATIENT
Start: 2021-03-01 | End: 2021-06-02

## 2021-03-01 RX ORDER — LEVOTHYROXINE SODIUM 0.1 MG/1
TABLET ORAL
Qty: 90 TABLET | Refills: 0 | Status: SHIPPED | OUTPATIENT
Start: 2021-03-01 | End: 2021-06-02

## 2021-03-03 ENCOUNTER — HOSPITAL ENCOUNTER (OUTPATIENT)
Dept: INFUSION THERAPY | Facility: HOSPITAL | Age: 71
Setting detail: INFUSION SERIES
Discharge: HOME | End: 2021-03-03

## 2021-03-03 VITALS
TEMPERATURE: 97.4 F | OXYGEN SATURATION: 94 % | SYSTOLIC BLOOD PRESSURE: 122 MMHG | HEART RATE: 73 BPM | WEIGHT: 175 LBS | BODY MASS INDEX: 32.2 KG/M2 | RESPIRATION RATE: 20 BRPM | DIASTOLIC BLOOD PRESSURE: 77 MMHG | HEIGHT: 62 IN

## 2021-03-03 DIAGNOSIS — K51.20 ULCERATIVE PROCTITIS WITHOUT COMPLICATION (HCC): Primary | ICD-10-CM

## 2021-03-03 DIAGNOSIS — K51.311 ULCERATIVE RECTOSIGMOIDITIS WITH RECTAL BLEEDING (HCC): ICD-10-CM

## 2021-03-03 PROCEDURE — 96413 CHEMO IV INFUSION 1 HR: CPT

## 2021-03-03 PROCEDURE — 25010000002 INFLIXIMAB PER 10 MG: Performed by: INTERNAL MEDICINE

## 2021-03-03 PROCEDURE — 96415 CHEMO IV INFUSION ADDL HR: CPT

## 2021-03-03 RX ADMIN — INFLIXIMAB 794 MG: 100 INJECTION, POWDER, LYOPHILIZED, FOR SOLUTION INTRAVENOUS at 09:09

## 2021-03-03 NOTE — PATIENT INSTRUCTIONS
"Call [facility] at [phone] if you have any problems or concerns.    We know you have a Choice in healthcare and appreciate you using [facility].  Our purpose is to provide you \"Excellent Care\".  We hope that you will always choose us in the future and continue to recommend us to your family and friends.        "

## 2021-03-15 RX ORDER — VENLAFAXINE HYDROCHLORIDE 75 MG/1
CAPSULE, EXTENDED RELEASE ORAL
Qty: 90 CAPSULE | Refills: 0 | Status: SHIPPED | OUTPATIENT
Start: 2021-03-15 | End: 2021-06-15

## 2021-04-15 ENCOUNTER — HOSPITAL ENCOUNTER (OUTPATIENT)
Dept: INFUSION THERAPY | Facility: HOSPITAL | Age: 71
Setting detail: INFUSION SERIES
Discharge: HOME | End: 2021-04-15

## 2021-04-21 ENCOUNTER — TELEPHONE (OUTPATIENT)
Dept: INTERNAL MEDICINE | Facility: CLINIC | Age: 71
End: 2021-04-21

## 2021-04-21 NOTE — TELEPHONE ENCOUNTER
Monique in [facility] has a note that patient is to have extra labs drawn at appt tomorrow for prolia shot, but does not see orders in chart other than for future.  Please confirm with [facility] before appt tomorrow.

## 2021-04-22 ENCOUNTER — HOSPITAL ENCOUNTER (OUTPATIENT)
Dept: INFUSION THERAPY | Facility: HOSPITAL | Age: 71
Setting detail: INFUSION SERIES
Discharge: HOME | End: 2021-04-22

## 2021-04-22 VITALS
SYSTOLIC BLOOD PRESSURE: 140 MMHG | BODY MASS INDEX: 32.2 KG/M2 | RESPIRATION RATE: 16 BRPM | TEMPERATURE: 97.8 F | DIASTOLIC BLOOD PRESSURE: 94 MMHG | HEIGHT: 62 IN | HEART RATE: 70 BPM | WEIGHT: 175 LBS | OXYGEN SATURATION: 96 %

## 2021-04-22 DIAGNOSIS — E03.9 HYPOTHYROIDISM, UNSPECIFIED TYPE: Primary | ICD-10-CM

## 2021-04-22 DIAGNOSIS — M81.0 AGE-RELATED OSTEOPOROSIS WITHOUT CURRENT PATHOLOGICAL FRACTURE: ICD-10-CM

## 2021-04-22 DIAGNOSIS — E78.5 HYPERLIPIDEMIA, UNSPECIFIED HYPERLIPIDEMIA TYPE: ICD-10-CM

## 2021-04-22 LAB
ALBUMIN SERPL-MCNC: 3.8 G/DL (ref 3.5–5.2)
ALBUMIN/GLOB SERPL: 1.1 G/DL
ALP SERPL-CCNC: 53 U/L (ref 39–117)
ALT SERPL W P-5'-P-CCNC: 10 U/L (ref 1–33)
ANION GAP SERPL CALCULATED.3IONS-SCNC: 12.7 MMOL/L (ref 5–15)
AST SERPL-CCNC: 17 U/L (ref 1–32)
BASOPHILS # BLD AUTO: 0.01 10*3/MM3 (ref 0–0.2)
BASOPHILS NFR BLD AUTO: 0.2 % (ref 0–1.5)
BILIRUB SERPL-MCNC: 0.4 MG/DL (ref 0–1.2)
BUN SERPL-MCNC: 9 MG/DL (ref 8–23)
BUN/CREAT SERPL: 17 (ref 7–25)
CALCIUM SPEC-SCNC: 8.9 MG/DL (ref 8.6–10.5)
CHLORIDE SERPL-SCNC: 99 MMOL/L (ref 98–107)
CHOLEST SERPL-MCNC: 231 MG/DL (ref 0–200)
CO2 SERPL-SCNC: 21.3 MMOL/L (ref 22–29)
CREAT SERPL-MCNC: 0.53 MG/DL (ref 0.57–1)
DEPRECATED RDW RBC AUTO: 43.4 FL (ref 37–54)
EOSINOPHIL # BLD AUTO: 0.29 10*3/MM3 (ref 0–0.4)
EOSINOPHIL NFR BLD AUTO: 5.3 % (ref 0.3–6.2)
ERYTHROCYTE [DISTWIDTH] IN BLOOD BY AUTOMATED COUNT: 11.9 % (ref 12.3–15.4)
GFR SERPL CREATININE-BSD FRML MDRD: 114 ML/MIN/1.73
GLOBULIN UR ELPH-MCNC: 3.6 GM/DL
GLUCOSE SERPL-MCNC: 98 MG/DL (ref 65–99)
HCT VFR BLD AUTO: 40.1 % (ref 34–46.6)
HDLC SERPL QL: 4.53
HDLC SERPL-MCNC: 51 MG/DL (ref 40–60)
HGB BLD-MCNC: 13.1 G/DL (ref 12–15.9)
IMM GRANULOCYTES # BLD AUTO: 0.01 10*3/MM3 (ref 0–0.05)
IMM GRANULOCYTES NFR BLD AUTO: 0.2 % (ref 0–0.5)
LDLC SERPL CALC-MCNC: 144 MG/DL (ref 0–100)
LYMPHOCYTES # BLD AUTO: 1.58 10*3/MM3 (ref 0.7–3.1)
LYMPHOCYTES NFR BLD AUTO: 28.7 % (ref 19.6–45.3)
MAGNESIUM SERPL-MCNC: 2 MG/DL (ref 1.6–2.4)
MCH RBC QN AUTO: 31.7 PG (ref 26.6–33)
MCHC RBC AUTO-ENTMCNC: 32.7 G/DL (ref 31.5–35.7)
MCV RBC AUTO: 97.1 FL (ref 79–97)
MONOCYTES # BLD AUTO: 0.39 10*3/MM3 (ref 0.1–0.9)
MONOCYTES NFR BLD AUTO: 7.1 % (ref 5–12)
NEUTROPHILS NFR BLD AUTO: 3.22 10*3/MM3 (ref 1.7–7)
NEUTROPHILS NFR BLD AUTO: 58.5 % (ref 42.7–76)
PHOSPHATE SERPL-MCNC: 3.7 MG/DL (ref 2.5–4.5)
PLATELET # BLD AUTO: 326 10*3/MM3 (ref 140–450)
PMV BLD AUTO: 9.7 FL (ref 6–12)
POTASSIUM SERPL-SCNC: 4 MMOL/L (ref 3.5–5.2)
PROT SERPL-MCNC: 7.4 G/DL (ref 6–8.5)
RBC # BLD AUTO: 4.13 10*6/MM3 (ref 3.77–5.28)
SODIUM SERPL-SCNC: 133 MMOL/L (ref 136–145)
T4 FREE SERPL-MCNC: 1.35 NG/DL (ref 0.93–1.7)
TRIGL SERPL-MCNC: 199 MG/DL (ref 0–150)
TSH SERPL DL<=0.05 MIU/L-ACNC: 1.48 UIU/ML (ref 0.27–4.2)
VLDLC SERPL-MCNC: 36 MG/DL (ref 5–40)
WBC # BLD AUTO: 5.5 10*3/MM3 (ref 3.4–10.8)

## 2021-04-22 PROCEDURE — 80061 LIPID PANEL: CPT | Performed by: FAMILY MEDICINE

## 2021-04-22 PROCEDURE — 80053 COMPREHEN METABOLIC PANEL: CPT | Performed by: FAMILY MEDICINE

## 2021-04-22 PROCEDURE — 84100 ASSAY OF PHOSPHORUS: CPT | Performed by: FAMILY MEDICINE

## 2021-04-22 PROCEDURE — 84439 ASSAY OF FREE THYROXINE: CPT | Performed by: FAMILY MEDICINE

## 2021-04-22 PROCEDURE — 83735 ASSAY OF MAGNESIUM: CPT | Performed by: FAMILY MEDICINE

## 2021-04-22 PROCEDURE — 84443 ASSAY THYROID STIM HORMONE: CPT | Performed by: FAMILY MEDICINE

## 2021-04-22 PROCEDURE — G0463 HOSPITAL OUTPT CLINIC VISIT: HCPCS

## 2021-04-22 PROCEDURE — 85025 COMPLETE CBC W/AUTO DIFF WBC: CPT | Performed by: FAMILY MEDICINE

## 2021-04-22 NOTE — PATIENT INSTRUCTIONS
Call [facility] at [phone] Denosumab injection  What is this medicine?  DENOSUMAB (den oh rosalio mab) slows bone breakdown. Prolia is used to treat osteoporosis in women after menopause and in men, and in people who are taking corticosteroids for 6 months or more. Xgeva is used to treat a high calcium level due to cancer and to prevent bone fractures and other bone problems caused by multiple myeloma or cancer bone metastases. Xgeva is also used to treat giant cell tumor of the bone.  This medicine may be used for other purposes; ask your health care provider or pharmacist if you have questions.  COMMON BRAND NAME(S): Prolia, XGEVA  What should I tell my health care provider before I take this medicine?  They need to know if you have any of these conditions:  · dental disease  · having surgery or tooth extraction  · infection  · kidney disease  · low levels of calcium or Vitamin D in the blood  · malnutrition  · on hemodialysis  · skin conditions or sensitivity  · thyroid or parathyroid disease  · an unusual reaction to denosumab, other medicines, foods, dyes, or preservatives  · pregnant or trying to get pregnant  · breast-feeding  How should I use this medicine?  This medicine is for injection under the skin. It is given by a health care professional in a hospital or clinic setting.  A special MedGuide will be given to you before each treatment. Be sure to read this information carefully each time.  For Prolia, talk to your pediatrician regarding the use of this medicine in children. Special care may be needed. For Xgeva, talk to your pediatrician regarding the use of this medicine in children. While this drug may be prescribed for children as young as 13 years for selected conditions, precautions do apply.  Overdosage: If you think you have taken too much of this medicine contact a poison control center or emergency room at once.  NOTE: This medicine is only for you. Do not share  this medicine with others.  What if I miss a dose?  It is important not to miss your dose. Call your doctor or health care professional if you are unable to keep an appointment.  What may interact with this medicine?  Do not take this medicine with any of the following medications:  · other medicines containing denosumab  This medicine may also interact with the following medications:  · medicines that lower your chance of fighting infection  · steroid medicines like prednisone or cortisone  This list may not describe all possible interactions. Give your health care provider a list of all the medicines, herbs, non-prescription drugs, or dietary supplements you use. Also tell them if you smoke, drink alcohol, or use illegal drugs. Some items may interact with your medicine.  What should I watch for while using this medicine?  Visit your doctor or health care professional for regular checks on your progress. Your doctor or health care professional may order blood tests and other tests to see how you are doing.  Call your doctor or health care professional for advice if you get a fever, chills or sore throat, or other symptoms of a cold or flu. Do not treat yourself. This drug may decrease your body's ability to fight infection. Try to avoid being around people who are sick.  You should make sure you get enough calcium and vitamin D while you are taking this medicine, unless your doctor tells you not to. Discuss the foods you eat and the vitamins you take with your health care professional.  See your dentist regularly. Brush and floss your teeth as directed. Before you have any dental work done, tell your dentist you are receiving this medicine.  Do not become pregnant while taking this medicine or for 5 months after stopping it. Talk with your doctor or health care professional about your birth control options while taking this medicine. Women should inform their doctor if they wish to become pregnant or think they  "might be pregnant. There is a potential for serious side effects to an unborn child. Talk to your health care professional or pharmacist for more information.  What side effects may I notice from receiving this medicine?  Side effects that you should report to your doctor or health care professional as soon as possible:  · allergic reactions like skin rash, itching or hives, swelling of the face, lips, or tongue  · bone pain  · breathing problems  · dizziness  · jaw pain, especially after dental work  · redness, blistering, peeling of the skin  · signs and symptoms of infection like fever or chills; cough; sore throat; pain or trouble passing urine  · signs of low calcium like fast heartbeat, muscle cramps or muscle pain; pain, tingling, numbness in the hands or feet; seizures  · unusual bleeding or bruising  · unusually weak or tired  Side effects that usually do not require medical attention (report to your doctor or health care professional if they continue or are bothersome):  · constipation  · diarrhea  · headache  · joint pain  · loss of appetite  · muscle pain  · runny nose  · tiredness  · upset stomach  This list may not describe all possible side effects. Call your doctor for medical advice about side effects. You may report side effects to FDA at 3-644-FDA-3717.  Where should I keep my medicine?  This medicine is only given in a clinic, doctor's office, or other health care setting and will not be stored at home.  NOTE: This sheet is a summary. It may not cover all possible information. If you have questions about this medicine, talk to your doctor, pharmacist, or health care provider.  © 2021 [employer]/Gold Standard (2019-04-26 16:10:44)   if you have any problems or concerns.    We know you have a Choice in healthcare and appreciate you using [facility].  Our purpose is to provide you \"Excellent Care\".  We hope that you will always choose us in the future and continue to recommend us to your " family and friends.

## 2021-04-28 ENCOUNTER — HOSPITAL ENCOUNTER (OUTPATIENT)
Dept: INFUSION THERAPY | Facility: HOSPITAL | Age: 71
Discharge: HOME | End: 2021-04-28
Admitting: INTERNAL MEDICINE

## 2021-04-28 VITALS
WEIGHT: 175 LBS | DIASTOLIC BLOOD PRESSURE: 79 MMHG | RESPIRATION RATE: 16 BRPM | OXYGEN SATURATION: 94 % | TEMPERATURE: 97.2 F | SYSTOLIC BLOOD PRESSURE: 125 MMHG | HEIGHT: 62 IN | HEART RATE: 73 BPM | BODY MASS INDEX: 32.2 KG/M2

## 2021-04-28 DIAGNOSIS — K51.311 ULCERATIVE RECTOSIGMOIDITIS WITH RECTAL BLEEDING (HCC): ICD-10-CM

## 2021-04-28 DIAGNOSIS — K51.20 ULCERATIVE PROCTITIS WITHOUT COMPLICATION (HCC): Primary | ICD-10-CM

## 2021-04-28 PROCEDURE — 96413 CHEMO IV INFUSION 1 HR: CPT

## 2021-04-28 PROCEDURE — 96415 CHEMO IV INFUSION ADDL HR: CPT

## 2021-04-28 PROCEDURE — 25010000002 INFLIXIMAB PER 10 MG: Performed by: INTERNAL MEDICINE

## 2021-04-28 RX ADMIN — INFLIXIMAB 794 MG: 100 INJECTION, POWDER, LYOPHILIZED, FOR SOLUTION INTRAVENOUS at 08:57

## 2021-05-06 ENCOUNTER — OFFICE VISIT (OUTPATIENT)
Dept: INTERNAL MEDICINE | Facility: CLINIC | Age: 71
End: 2021-05-06

## 2021-05-06 VITALS
SYSTOLIC BLOOD PRESSURE: 124 MMHG | BODY MASS INDEX: 32.83 KG/M2 | TEMPERATURE: 96.9 F | HEIGHT: 62 IN | DIASTOLIC BLOOD PRESSURE: 98 MMHG | WEIGHT: 178.4 LBS | RESPIRATION RATE: 18 BRPM | HEART RATE: 85 BPM | OXYGEN SATURATION: 93 %

## 2021-05-06 DIAGNOSIS — E78.5 HYPERLIPIDEMIA, UNSPECIFIED HYPERLIPIDEMIA TYPE: Primary | ICD-10-CM

## 2021-05-06 DIAGNOSIS — G45.9 TIA (TRANSIENT ISCHEMIC ATTACK): ICD-10-CM

## 2021-05-06 DIAGNOSIS — Z00.00 ROUTINE HEALTH MAINTENANCE: ICD-10-CM

## 2021-05-06 DIAGNOSIS — M05.79 RHEUMATOID ARTHRITIS INVOLVING MULTIPLE SITES WITH POSITIVE RHEUMATOID FACTOR (HCC): ICD-10-CM

## 2021-05-06 DIAGNOSIS — K52.9 NON-SPECIFIC COLITIS: ICD-10-CM

## 2021-05-06 DIAGNOSIS — E03.9 HYPOTHYROIDISM, UNSPECIFIED TYPE: ICD-10-CM

## 2021-05-06 DIAGNOSIS — F32.A DEPRESSION, UNSPECIFIED DEPRESSION TYPE: ICD-10-CM

## 2021-05-06 DIAGNOSIS — M81.0 AGE-RELATED OSTEOPOROSIS WITHOUT CURRENT PATHOLOGICAL FRACTURE: ICD-10-CM

## 2021-05-06 DIAGNOSIS — I45.6 WPW (WOLFF-PARKINSON-WHITE SYNDROME): ICD-10-CM

## 2021-05-06 PROBLEM — R31.9 HEMATURIA: Status: ACTIVE | Noted: 2021-05-06

## 2021-05-06 PROBLEM — M72.2 PLANTAR FASCIITIS: Status: ACTIVE | Noted: 2021-05-06

## 2021-05-06 PROBLEM — K21.00 GASTROESOPHAGEAL REFLUX DISEASE WITH ESOPHAGITIS: Status: ACTIVE | Noted: 2021-05-06

## 2021-05-06 PROBLEM — K21.9 GERD (GASTROESOPHAGEAL REFLUX DISEASE): Status: ACTIVE | Noted: 2021-05-06

## 2021-05-06 PROBLEM — J06.9 ACUTE UPPER RESPIRATORY INFECTION: Status: ACTIVE | Noted: 2021-05-06

## 2021-05-06 PROBLEM — M17.9 OSTEOARTHRITIS OF KNEE: Status: ACTIVE | Noted: 2021-05-06

## 2021-05-06 PROBLEM — Z01.810 PREOP CARDIOVASCULAR EXAM: Status: ACTIVE | Noted: 2018-10-01

## 2021-05-06 PROCEDURE — 99214 OFFICE O/P EST MOD 30 MIN: CPT | Performed by: FAMILY MEDICINE

## 2021-05-06 NOTE — PROGRESS NOTES
Subjective     Yelena Rangel is a 70 y.o. female, who presents with a chief complaint of   Chief Complaint   Patient presents with   • Hyperlipidemia       Hyperlipidemia  Exacerbating diseases include hypothyroidism.   Hypothyroidism  Associated symptoms include arthralgias.   Depression    1. Hyperlipidemia.  Doesn't tolerate statins.  She has been exercising regularly.    2. Ulcerative colitis.  On Remicade every 2 months per Dr. Saldaña.  She is still feeling well.    3. Depression.  Pt reports she is still doing well with venlafaxine.    4. Rheumatoid arthritis. This is also well-controlled with the Remicade.    The following portions of the patient's history were reviewed and updated as appropriate: allergies, current medications, past family history, past medical history, past social history, past surgical history and problem list.    Allergies: Sulfa antibiotics    Review of Systems   Constitutional: Negative.    HENT: Negative.    Eyes: Negative.    Respiratory: Negative.    Cardiovascular: Negative.    Gastrointestinal: Negative.    Endocrine: Negative.    Genitourinary: Negative.    Musculoskeletal: Positive for arthralgias.   Skin: Negative.    Allergic/Immunologic: Positive for environmental allergies.   Neurological: Negative.    Hematological: Negative.    Psychiatric/Behavioral: Negative.        Objective     Wt Readings from Last 3 Encounters:   05/06/21 80.9 kg (178 lb 6.4 oz)   04/28/21 79.4 kg (175 lb)   04/22/21 79.4 kg (175 lb)     Temp Readings from Last 3 Encounters:   05/06/21 96.9 °F (36.1 °C) (Temporal)   04/28/21 97.2 °F (36.2 °C)   04/22/21 97.8 °F (36.6 °C) (Temporal)     BP Readings from Last 3 Encounters:   05/06/21 124/98   04/28/21 125/79   04/22/21 140/94     Pulse Readings from Last 3 Encounters:   05/06/21 85   04/28/21 73   04/22/21 70     Body mass index is 32.62 kg/m².  SpO2 Readings from Last 3 Encounters:   09/27/17 98%   03/29/17 98%   09/28/16 97%       Physical Exam    Constitutional: She is oriented to person, place, and time. She appears well-developed. No distress.   HENT:   Head: Normocephalic and atraumatic.   Mouth/Throat: Mucous membranes are moist.   Eyes: Conjunctivae are normal.   Neck: No thyromegaly present.   Cardiovascular: Normal rate, regular rhythm and normal heart sounds.   Pulmonary/Chest: Effort normal and breath sounds normal. No respiratory distress.   Abdominal: Normal appearance.   Musculoskeletal:      Right lower leg: No edema.      Left lower leg: No edema.   Neurological: She is alert and oriented to person, place, and time.   Skin: Skin is warm and dry. No erythema. No pallor.   Psychiatric: Her behavior is normal. Mood normal.   Nursing note and vitals reviewed.      Results for orders placed or performed during the hospital encounter of 04/22/21   TSH    Specimen: Blood   Result Value Ref Range    TSH 1.480 0.270 - 4.200 uIU/mL   T4, Free    Specimen: Blood   Result Value Ref Range    Free T4 1.35 0.93 - 1.70 ng/dL   Lipid Panel With / Chol / HDL Ratio    Specimen: Blood   Result Value Ref Range    Total Cholesterol 231 (H) 0 - 200 mg/dL    Triglycerides 199 (H) 0 - 150 mg/dL    HDL Cholesterol 51 40 - 60 mg/dL    LDL Cholesterol  144 (H) 0 - 100 mg/dL    VLDL Cholesterol 36 5 - 40 mg/dL    Chol/HDL Ratio 4.53    Comprehensive Metabolic Panel    Specimen: Blood   Result Value Ref Range    Glucose 98 65 - 99 mg/dL    BUN 9 8 - 23 mg/dL    Creatinine 0.53 (L) 0.57 - 1.00 mg/dL    Sodium 133 (L) 136 - 145 mmol/L    Potassium 4.0 3.5 - 5.2 mmol/L    Chloride 99 98 - 107 mmol/L    CO2 21.3 (L) 22.0 - 29.0 mmol/L    Calcium 8.9 8.6 - 10.5 mg/dL    Total Protein 7.4 6.0 - 8.5 g/dL    Albumin 3.80 3.50 - 5.20 g/dL    ALT (SGPT) 10 1 - 33 U/L    AST (SGOT) 17 1 - 32 U/L    Alkaline Phosphatase 53 39 - 117 U/L    Total Bilirubin 0.4 0.0 - 1.2 mg/dL    eGFR Non African Amer 114 >60 mL/min/1.73    Globulin 3.6 gm/dL    A/G Ratio 1.1 g/dL    BUN/Creatinine  Ratio 17.0 7.0 - 25.0    Anion Gap 12.7 5.0 - 15.0 mmol/L   CBC Auto Differential    Specimen: Blood   Result Value Ref Range    WBC 5.50 3.40 - 10.80 10*3/mm3    RBC 4.13 3.77 - 5.28 10*6/mm3    Hemoglobin 13.1 12.0 - 15.9 g/dL    Hematocrit 40.1 34.0 - 46.6 %    MCV 97.1 (H) 79.0 - 97.0 fL    MCH 31.7 26.6 - 33.0 pg    MCHC 32.7 31.5 - 35.7 g/dL    RDW 11.9 (L) 12.3 - 15.4 %    RDW-SD 43.4 37.0 - 54.0 fl    MPV 9.7 6.0 - 12.0 fL    Platelets 326 140 - 450 10*3/mm3    Neutrophil % 58.5 42.7 - 76.0 %    Lymphocyte % 28.7 19.6 - 45.3 %    Monocyte % 7.1 5.0 - 12.0 %    Eosinophil % 5.3 0.3 - 6.2 %    Basophil % 0.2 0.0 - 1.5 %    Immature Grans % 0.2 0.0 - 0.5 %    Neutrophils, Absolute 3.22 1.70 - 7.00 10*3/mm3    Lymphocytes, Absolute 1.58 0.70 - 3.10 10*3/mm3    Monocytes, Absolute 0.39 0.10 - 0.90 10*3/mm3    Eosinophils, Absolute 0.29 0.00 - 0.40 10*3/mm3    Basophils, Absolute 0.01 0.00 - 0.20 10*3/mm3    Immature Grans, Absolute 0.01 0.00 - 0.05 10*3/mm3       Assessment/Plan   Diagnoses and all orders for this visit:    1. Hyperlipidemia, unspecified hyperlipidemia type (Primary)  -     Comprehensive Metabolic Panel; Future  -     Lipid Panel With / Chol / HDL Ratio; Future    2. Hypothyroidism, unspecified type  -     CBC & Differential; Future  -     TSH; Future  -     T4, Free; Future    3. Depression, unspecified depression type    4. Age-related osteoporosis without current pathological fracture  -     Phosphorus; Future  -     Magnesium; Future    5. Non-specific colitis    6. WPW (Liseth-Parkinson-White syndrome)    7. Rheumatoid arthritis involving multiple sites with positive rheumatoid factor (CMS/HCC)    8. TIA (transient ischemic attack)    9. Routine health maintenance    1. Hyperlipidemia.  Off statin.  Continue lifestyle measures.     2. Hypothyroidism.  Adequately replaced. Labs reviewed.    3. Depression.  Controlled.  Continue venlafaxine and lifestyle measures.     4. Osteoporosis.  Continue  Prolia.  Dexa scan due 9/2021.    5. Ulcerative colitis.  Well-controlled.  Continue per Dr. Saldaña.    6. Liseth-Parkinson-White.  She saw cardiology before her knee replacements.    7. RA.  Controlled with Remicade.    8. TIA.  Continue ASA 81 mg.  She does not tolerate statins.  Considering Zetia.    9. Routine health maint.  Prevnar and Pneumovax UTD.  Mammogram UTD.  Covid-19 vaccine done.  Had both doses of Shingrix at pharmacy.      Outpatient Medications Prior to Visit   Medication Sig Dispense Refill   • Acetaminophen (TYLENOL ARTHRITIS PAIN PO) Take 1 tablet by mouth 3 (Three) Times a Day. AS NEEDED FOR ARTHRITIS     • aspirin 81 MG chewable tablet Chew 81 mg Daily.     • Calcium Carb-Cholecalciferol (CALCIUM PLUS VITAMIN D3 PO) Take 1,400 mg by mouth Daily. D3 = 1500 IU     • cetirizine (ZyrTEC) 10 MG tablet Take 10 mg by mouth daily.     • denosumab (Prolia) 60 MG/ML solution prefilled syringe syringe Inject  under the skin into the appropriate area as directed 1 (One) Time.     • InFLIXimab (REMICADE IV) Infuse 1 dose into a venous catheter Every 30 (Thirty) Days. Every 60 days.  Dr. Saldaña.     • levothyroxine (SYNTHROID, LEVOTHROID) 100 MCG tablet TAKE ONE TABLET BY MOUTH DAILY 90 tablet 0   • Multiple Vitamins-Minerals (MULTIVITAMIN WITH MINERALS) tablet tablet Take 1 tablet by mouth Daily.     • omeprazole (priLOSEC) 20 MG capsule TAKE ONE CAPSULE BY MOUTH DAILY 90 capsule 0   • venlafaxine XR (EFFEXOR-XR) 75 MG 24 hr capsule TAKE ONE CAPSULE BY MOUTH DAILY 90 capsule 0   • COVID-19 Ad26 Vaccine,Mike/Rosalio & Rosalio, 0.5 ML suspension Inject 0.5 mL into the appropriate muscle as directed by prescriber 1 (One) Time. GIVEN ON 03/17/2021       No facility-administered medications prior to visit.     No orders of the defined types were placed in this encounter.    [unfilled]  Medications Discontinued During This Encounter   Medication Reason   • COVID-19 Ad26 Vaccine,Mike/Rosalio &  Rosalio, 0.5 ML suspension *Therapy completed       Return in about 6 months (around 11/6/2021).

## 2021-06-01 DIAGNOSIS — E03.9 HYPOTHYROIDISM, UNSPECIFIED TYPE: ICD-10-CM

## 2021-06-02 RX ORDER — LEVOTHYROXINE SODIUM 0.1 MG/1
TABLET ORAL
Qty: 90 TABLET | Refills: 1 | Status: SHIPPED | OUTPATIENT
Start: 2021-06-02 | End: 2021-11-24 | Stop reason: SDUPTHER

## 2021-06-02 RX ORDER — OMEPRAZOLE 20 MG/1
CAPSULE, DELAYED RELEASE ORAL
Qty: 90 CAPSULE | Refills: 1 | Status: SHIPPED | OUTPATIENT
Start: 2021-06-02 | End: 2021-12-01 | Stop reason: SDUPTHER

## 2021-06-15 RX ORDER — VENLAFAXINE HYDROCHLORIDE 75 MG/1
CAPSULE, EXTENDED RELEASE ORAL
Qty: 90 CAPSULE | Refills: 1 | Status: SHIPPED | OUTPATIENT
Start: 2021-06-15 | End: 2021-12-20 | Stop reason: SDUPTHER

## 2021-06-23 ENCOUNTER — HOSPITAL ENCOUNTER (OUTPATIENT)
Dept: INFUSION THERAPY | Facility: HOSPITAL | Age: 71
Discharge: HOME | End: 2021-06-23
Admitting: INTERNAL MEDICINE

## 2021-06-23 VITALS
RESPIRATION RATE: 16 BRPM | WEIGHT: 175 LBS | HEIGHT: 62 IN | BODY MASS INDEX: 32.2 KG/M2 | OXYGEN SATURATION: 94 % | TEMPERATURE: 98.2 F | DIASTOLIC BLOOD PRESSURE: 89 MMHG | SYSTOLIC BLOOD PRESSURE: 147 MMHG | HEART RATE: 68 BPM

## 2021-06-23 DIAGNOSIS — K51.20 ULCERATIVE PROCTITIS WITHOUT COMPLICATION (HCC): Primary | ICD-10-CM

## 2021-06-23 DIAGNOSIS — K52.9 NON-SPECIFIC COLITIS: ICD-10-CM

## 2021-06-23 PROCEDURE — 96415 CHEMO IV INFUSION ADDL HR: CPT

## 2021-06-23 PROCEDURE — 96413 CHEMO IV INFUSION 1 HR: CPT

## 2021-06-23 PROCEDURE — 25010000002 INFLIXIMAB PER 10 MG: Performed by: INTERNAL MEDICINE

## 2021-06-23 PROCEDURE — 96366 THER/PROPH/DIAG IV INF ADDON: CPT

## 2021-06-23 RX ADMIN — INFLIXIMAB 794 MG: 100 INJECTION, POWDER, LYOPHILIZED, FOR SOLUTION INTRAVENOUS at 09:00

## 2021-06-23 NOTE — NURSING NOTE
Patient arrived vitals taken. IV inserted and medication therapy initiated. Medication completed and patient tolerated therapy well. Vitals repeated. Patient refused paper AVS.

## 2021-06-23 NOTE — PATIENT INSTRUCTIONS
"Call Dr. Dl Saldaña, Gastroenterology at [phone] if you have any problems or concerns.    We know you have a Choice in healthcare and appreciate you using [facility].  Our purpose is to provide you \"Excellent Care\".  We hope that you will always choose us in the future and continue to recommend us to your family and friends.        "

## 2021-08-18 ENCOUNTER — HOSPITAL ENCOUNTER (OUTPATIENT)
Dept: INFUSION THERAPY | Facility: HOSPITAL | Age: 71
Discharge: HOME | End: 2021-08-18
Admitting: INTERNAL MEDICINE

## 2021-08-18 VITALS
WEIGHT: 176 LBS | SYSTOLIC BLOOD PRESSURE: 138 MMHG | DIASTOLIC BLOOD PRESSURE: 87 MMHG | OXYGEN SATURATION: 93 % | TEMPERATURE: 98 F | BODY MASS INDEX: 32.19 KG/M2 | RESPIRATION RATE: 16 BRPM | HEART RATE: 72 BPM

## 2021-08-18 DIAGNOSIS — K51.20 ULCERATIVE PROCTITIS WITHOUT COMPLICATION (HCC): Primary | ICD-10-CM

## 2021-08-18 DIAGNOSIS — K52.9 NON-SPECIFIC COLITIS: ICD-10-CM

## 2021-08-18 PROCEDURE — 96413 CHEMO IV INFUSION 1 HR: CPT

## 2021-08-18 PROCEDURE — 96415 CHEMO IV INFUSION ADDL HR: CPT

## 2021-08-18 PROCEDURE — 25010000002 INFLIXIMAB PER 10 MG: Performed by: INTERNAL MEDICINE

## 2021-08-18 RX ADMIN — INFLIXIMAB 800 MG: 100 INJECTION, POWDER, LYOPHILIZED, FOR SOLUTION INTRAVENOUS at 08:58

## 2021-08-18 NOTE — NURSING NOTE
NURSING PROGRESS NOTE:    PATIENT ARRIVE TO [facility] FOR SCHEDULED INFUSION AT 0815 .  PROCEDURE WAS PERFORMED WITHOUT INCIDENT.  PATIENT DISCHARGED HOME AT 1120 . KAILYN LEE

## 2021-10-13 ENCOUNTER — HOSPITAL ENCOUNTER (OUTPATIENT)
Dept: INFUSION THERAPY | Facility: HOSPITAL | Age: 71
Discharge: HOME | End: 2021-10-13
Admitting: INTERNAL MEDICINE

## 2021-10-13 VITALS
OXYGEN SATURATION: 94 % | SYSTOLIC BLOOD PRESSURE: 130 MMHG | TEMPERATURE: 97.8 F | HEART RATE: 78 BPM | DIASTOLIC BLOOD PRESSURE: 90 MMHG | BODY MASS INDEX: 32.19 KG/M2 | WEIGHT: 176 LBS | RESPIRATION RATE: 16 BRPM

## 2021-10-13 DIAGNOSIS — K52.9 NON-SPECIFIC COLITIS: ICD-10-CM

## 2021-10-13 DIAGNOSIS — K51.00 ULCERATIVE PANCOLITIS (HCC): ICD-10-CM

## 2021-10-13 DIAGNOSIS — K51.00 ULCERATIVE PANCOLITIS (HCC): Primary | ICD-10-CM

## 2021-10-13 DIAGNOSIS — K51.20 ULCERATIVE PROCTITIS WITHOUT COMPLICATION (HCC): Primary | ICD-10-CM

## 2021-10-13 PROCEDURE — 96415 CHEMO IV INFUSION ADDL HR: CPT

## 2021-10-13 PROCEDURE — 96413 CHEMO IV INFUSION 1 HR: CPT

## 2021-10-13 PROCEDURE — 25010000002 INFLIXIMAB PER 10 MG: Performed by: INTERNAL MEDICINE

## 2021-10-13 RX ADMIN — INFLIXIMAB 800 MG: 100 INJECTION, POWDER, LYOPHILIZED, FOR SOLUTION INTRAVENOUS at 09:09

## 2021-10-13 NOTE — NURSING NOTE
NURSING PROGRESS NOTE:    PATIENT ARRIVE TO [facility] FOR SCHEDULED INFUSION AT 0810 .  PROCEDURE WAS PERFORMED WITHOUT INCIDENT.   PATIENT DISCHARGED HOME AT 1132 . KAILYN LEE

## 2021-10-18 ENCOUNTER — TELEPHONE (OUTPATIENT)
Dept: GASTROENTEROLOGY | Facility: CLINIC | Age: 71
End: 2021-10-18

## 2021-10-18 NOTE — TELEPHONE ENCOUNTER
Attempted to reach pt   Past due for appt with [facility]   LOV 2019  Currently receiving Remicade  Past due on Labs    PA is due 12/31/21

## 2021-10-20 DIAGNOSIS — Z11.1 SCREENING FOR TUBERCULOSIS: Primary | ICD-10-CM

## 2021-10-20 DIAGNOSIS — Z11.59 NEED FOR HEPATITIS C SCREENING TEST: ICD-10-CM

## 2021-10-20 DIAGNOSIS — Z11.59 NEED FOR HEPATITIS B SCREENING TEST: ICD-10-CM

## 2021-10-21 ENCOUNTER — TELEPHONE (OUTPATIENT)
Dept: INTERNAL MEDICINE | Facility: CLINIC | Age: 71
End: 2021-10-21

## 2021-10-21 NOTE — TELEPHONE ENCOUNTER
Caller: Juan Carlos Yelena TYE    Relationship: Self    Best call back number:     What is the best time to reach you: ANYTIME    Who are you requesting to speak with (clinical staff, provider,  specific staff member):     Do you know the name of the person who called:     What was the call regarding: PATIENT IS CALLING IN STATING THAT SHE HAS HER PROLIA INJECTION AT THE [facility].  THEY ARE REQUESTING THAT SHE HAVE SOME LABS DRAWN(PATIENT DOES NOT KNOW WHICH ONES), DON AT THE [facility] IS REQUESTING A CALL FROM DR ROBERTS OR STAFF WITH DIRECTIONS ON THIS. PATIENT HAS A SCHEDULE APPOINTMENT FOR LABS ON 10/28/21 AND WANTS TO BE CALLED ONCE THE OFFICE SPEAKS TO DON ABOUT THE OTHER LABS THAT THE OFFICE IS REQUESTING.     Do you require a callback: YES

## 2021-10-28 ENCOUNTER — LAB (OUTPATIENT)
Dept: INTERNAL MEDICINE | Facility: CLINIC | Age: 71
End: 2021-10-28

## 2021-10-28 DIAGNOSIS — Z00.00 ROUTINE HEALTH MAINTENANCE: Primary | ICD-10-CM

## 2021-10-28 DIAGNOSIS — E78.5 HYPERLIPIDEMIA, UNSPECIFIED HYPERLIPIDEMIA TYPE: ICD-10-CM

## 2021-10-28 DIAGNOSIS — E03.9 HYPOTHYROIDISM, UNSPECIFIED TYPE: ICD-10-CM

## 2021-10-28 DIAGNOSIS — M81.0 AGE-RELATED OSTEOPOROSIS WITHOUT CURRENT PATHOLOGICAL FRACTURE: ICD-10-CM

## 2021-10-29 LAB
ALBUMIN SERPL-MCNC: 4.4 G/DL (ref 3.8–4.8)
ALBUMIN/GLOB SERPL: 1.2 {RATIO} (ref 1.2–2.2)
ALP SERPL-CCNC: 58 IU/L (ref 44–121)
ALT SERPL-CCNC: 13 IU/L (ref 0–32)
AST SERPL-CCNC: 21 IU/L (ref 0–40)
BASOPHILS # BLD AUTO: 0.1 X10E3/UL (ref 0–0.2)
BASOPHILS NFR BLD AUTO: 1 %
BILIRUB SERPL-MCNC: 0.5 MG/DL (ref 0–1.2)
BUN SERPL-MCNC: 9 MG/DL (ref 8–27)
BUN/CREAT SERPL: 15 (ref 12–28)
CALCIUM SERPL-MCNC: 9.6 MG/DL (ref 8.7–10.3)
CHLORIDE SERPL-SCNC: 95 MMOL/L (ref 96–106)
CHOLEST SERPL-MCNC: 284 MG/DL (ref 100–199)
CHOLEST/HDLC SERPL: 4.7 RATIO (ref 0–4.4)
CO2 SERPL-SCNC: 25 MMOL/L (ref 20–29)
CREAT SERPL-MCNC: 0.59 MG/DL (ref 0.57–1)
EOSINOPHIL # BLD AUTO: 0.2 X10E3/UL (ref 0–0.4)
EOSINOPHIL NFR BLD AUTO: 5 %
ERYTHROCYTE [DISTWIDTH] IN BLOOD BY AUTOMATED COUNT: 12 % (ref 11.7–15.4)
GLOBULIN SER CALC-MCNC: 3.6 G/DL (ref 1.5–4.5)
GLUCOSE SERPL-MCNC: 94 MG/DL (ref 65–99)
HBA1C MFR BLD: 5.7 % (ref 4.8–5.6)
HCT VFR BLD AUTO: 40 % (ref 34–46.6)
HDLC SERPL-MCNC: 61 MG/DL
HGB BLD-MCNC: 13.1 G/DL (ref 11.1–15.9)
IMM GRANULOCYTES # BLD AUTO: 0 X10E3/UL (ref 0–0.1)
IMM GRANULOCYTES NFR BLD AUTO: 0 %
LDLC SERPL CALC-MCNC: 184 MG/DL (ref 0–99)
LYMPHOCYTES # BLD AUTO: 2.1 X10E3/UL (ref 0.7–3.1)
LYMPHOCYTES NFR BLD AUTO: 42 %
MAGNESIUM SERPL-MCNC: 2.1 MG/DL (ref 1.6–2.3)
MCH RBC QN AUTO: 31.9 PG (ref 26.6–33)
MCHC RBC AUTO-ENTMCNC: 32.8 G/DL (ref 31.5–35.7)
MCV RBC AUTO: 97 FL (ref 79–97)
MONOCYTES # BLD AUTO: 0.6 X10E3/UL (ref 0.1–0.9)
MONOCYTES NFR BLD AUTO: 13 %
NEUTROPHILS # BLD AUTO: 2 X10E3/UL (ref 1.4–7)
NEUTROPHILS NFR BLD AUTO: 39 %
PHOSPHATE SERPL-MCNC: 4.3 MG/DL (ref 3–4.3)
PLATELET # BLD AUTO: 338 X10E3/UL (ref 150–450)
POTASSIUM SERPL-SCNC: 4.9 MMOL/L (ref 3.5–5.2)
PROT SERPL-MCNC: 8 G/DL (ref 6–8.5)
RBC # BLD AUTO: 4.11 X10E6/UL (ref 3.77–5.28)
SODIUM SERPL-SCNC: 134 MMOL/L (ref 134–144)
TRIGL SERPL-MCNC: 209 MG/DL (ref 0–149)
TSH SERPL DL<=0.005 MIU/L-ACNC: 0.64 UIU/ML (ref 0.45–4.5)
VIT B12 SERPL-MCNC: 1142 PG/ML (ref 232–1245)
VLDLC SERPL CALC-MCNC: 39 MG/DL (ref 5–40)
WBC # BLD AUTO: 5 X10E3/UL (ref 3.4–10.8)

## 2021-11-01 ENCOUNTER — TELEPHONE (OUTPATIENT)
Dept: INTERNAL MEDICINE | Facility: CLINIC | Age: 71
End: 2021-11-01

## 2021-11-01 DIAGNOSIS — M81.0 AGE-RELATED OSTEOPOROSIS WITHOUT CURRENT PATHOLOGICAL FRACTURE: Primary | ICD-10-CM

## 2021-11-01 NOTE — TELEPHONE ENCOUNTER
PT IS REQUESTING A ORDER FOR PROLIA BE SENT TO [facility] FOR HER INJECTION THIS Thursday PLEASE.      THANK YOU.

## 2021-11-04 ENCOUNTER — HOSPITAL ENCOUNTER (OUTPATIENT)
Dept: INFUSION THERAPY | Facility: HOSPITAL | Age: 71
Setting detail: INFUSION SERIES
Discharge: HOME | End: 2021-11-04

## 2021-11-04 ENCOUNTER — OFFICE VISIT (OUTPATIENT)
Dept: INTERNAL MEDICINE | Facility: CLINIC | Age: 71
End: 2021-11-04

## 2021-11-04 VITALS
TEMPERATURE: 97.8 F | HEIGHT: 62 IN | BODY MASS INDEX: 33.13 KG/M2 | RESPIRATION RATE: 16 BRPM | OXYGEN SATURATION: 99 % | SYSTOLIC BLOOD PRESSURE: 145 MMHG | DIASTOLIC BLOOD PRESSURE: 70 MMHG | WEIGHT: 180 LBS | HEART RATE: 81 BPM

## 2021-11-04 VITALS
RESPIRATION RATE: 16 BRPM | TEMPERATURE: 97 F | SYSTOLIC BLOOD PRESSURE: 147 MMHG | OXYGEN SATURATION: 96 % | DIASTOLIC BLOOD PRESSURE: 88 MMHG | HEART RATE: 56 BPM

## 2021-11-04 DIAGNOSIS — Z00.00 ROUTINE HEALTH MAINTENANCE: ICD-10-CM

## 2021-11-04 DIAGNOSIS — E03.9 HYPOTHYROIDISM, UNSPECIFIED TYPE: ICD-10-CM

## 2021-11-04 DIAGNOSIS — K51.00 ULCERATIVE PANCOLITIS (HCC): ICD-10-CM

## 2021-11-04 DIAGNOSIS — M81.0 AGE-RELATED OSTEOPOROSIS WITHOUT CURRENT PATHOLOGICAL FRACTURE: ICD-10-CM

## 2021-11-04 DIAGNOSIS — M81.0 OSTEOPOROSIS WITHOUT PATHOLOGICAL FRACTURE: ICD-10-CM

## 2021-11-04 DIAGNOSIS — G45.9 TIA (TRANSIENT ISCHEMIC ATTACK): ICD-10-CM

## 2021-11-04 DIAGNOSIS — E78.5 HYPERLIPIDEMIA, UNSPECIFIED HYPERLIPIDEMIA TYPE: ICD-10-CM

## 2021-11-04 DIAGNOSIS — F32.A DEPRESSION, UNSPECIFIED DEPRESSION TYPE: ICD-10-CM

## 2021-11-04 DIAGNOSIS — M81.0 AGE-RELATED OSTEOPOROSIS WITHOUT CURRENT PATHOLOGICAL FRACTURE: Primary | ICD-10-CM

## 2021-11-04 DIAGNOSIS — M05.79 RHEUMATOID ARTHRITIS INVOLVING MULTIPLE SITES WITH POSITIVE RHEUMATOID FACTOR (HCC): ICD-10-CM

## 2021-11-04 DIAGNOSIS — Z12.31 ENCOUNTER FOR SCREENING MAMMOGRAM FOR MALIGNANT NEOPLASM OF BREAST: ICD-10-CM

## 2021-11-04 DIAGNOSIS — Z23 ENCOUNTER FOR IMMUNIZATION: Primary | ICD-10-CM

## 2021-11-04 DIAGNOSIS — I45.6 WPW (WOLFF-PARKINSON-WHITE SYNDROME): ICD-10-CM

## 2021-11-04 PROCEDURE — 90662 IIV NO PRSV INCREASED AG IM: CPT | Performed by: FAMILY MEDICINE

## 2021-11-04 PROCEDURE — 96372 THER/PROPH/DIAG INJ SC/IM: CPT

## 2021-11-04 PROCEDURE — 99214 OFFICE O/P EST MOD 30 MIN: CPT | Performed by: FAMILY MEDICINE

## 2021-11-04 PROCEDURE — G0008 ADMIN INFLUENZA VIRUS VAC: HCPCS | Performed by: FAMILY MEDICINE

## 2021-11-04 PROCEDURE — 25010000002 DENOSUMAB 60 MG/ML SOLUTION PREFILLED SYRINGE: Performed by: FAMILY MEDICINE

## 2021-11-04 RX ADMIN — DENOSUMAB 60 MG: 60 INJECTION SUBCUTANEOUS at 08:22

## 2021-11-04 NOTE — PROGRESS NOTES
Subjective     Yelena Rangel is a 71 y.o. female, who presents with a chief complaint of   Chief Complaint   Patient presents with   • Hyperlipidemia       Hyperlipidemia  Exacerbating diseases include hypothyroidism.   Hypothyroidism  Pertinent negatives include no arthralgias.   Depression    1. Hyperlipidemia.  Doesn't tolerate statins.  She has been exercising regularly.    2. Ulcerative colitis.  On Remicade every 2 months per Dr. Saldaña.  She has been having intermittent upper abdominal bloating and distension.    3. Depression.  Pt reports she is still doing well with venlafaxine.    4. Rheumatoid arthritis. This is also well-controlled with the Remicade.    The following portions of the patient's history were reviewed and updated as appropriate: allergies, current medications, past family history, past medical history, past social history, past surgical history and problem list.    Allergies: Sulfa antibiotics    Review of Systems   Constitutional: Negative.    HENT: Negative.    Eyes: Negative.    Respiratory: Negative.    Cardiovascular: Negative.    Gastrointestinal: Negative.    Endocrine: Negative.    Genitourinary: Negative.    Musculoskeletal: Negative.  Negative for arthralgias.   Skin: Negative.    Allergic/Immunologic: Positive for environmental allergies.   Neurological: Negative.    Hematological: Negative.    Psychiatric/Behavioral: Negative.        Objective     Wt Readings from Last 3 Encounters:   11/04/21 81.6 kg (180 lb)   10/13/21 79.8 kg (176 lb)   08/18/21 79.8 kg (176 lb)     Temp Readings from Last 3 Encounters:   11/04/21 97.8 °F (36.6 °C)   11/04/21 97 °F (36.1 °C) (Temporal)   10/13/21 97.8 °F (36.6 °C) (Temporal)     BP Readings from Last 3 Encounters:   11/04/21 145/70   11/04/21 147/88   10/13/21 130/90     Pulse Readings from Last 3 Encounters:   11/04/21 81   11/04/21 56   10/13/21 78     Body mass index is 32.91 kg/m².  SpO2 Readings from Last 3 Encounters:   09/27/17 98%    03/29/17 98%   09/28/16 97%       Physical Exam   Constitutional: She is oriented to person, place, and time. She appears well-developed. No distress.   HENT:   Head: Normocephalic and atraumatic.   Mouth/Throat: Mucous membranes are moist.   Eyes: Conjunctivae are normal.   Neck: No thyromegaly present.   Cardiovascular: Normal rate, regular rhythm and normal heart sounds.   Pulmonary/Chest: Effort normal and breath sounds normal. No respiratory distress.   Abdominal: Normal appearance.   Musculoskeletal:      Right lower leg: No edema.      Left lower leg: No edema.   Neurological: She is alert and oriented to person, place, and time.   Skin: Skin is warm and dry. No erythema. No pallor.   Psychiatric: Her behavior is normal. Mood normal.   Nursing note and vitals reviewed.      Results for orders placed or performed in visit on 10/28/21   Comprehensive Metabolic Panel    Specimen: Blood   Result Value Ref Range    Glucose 94 65 - 99 mg/dL    BUN 9 8 - 27 mg/dL    Creatinine 0.59 0.57 - 1.00 mg/dL    eGFR Non African Am 93 >59 mL/min/1.73    eGFR African Am 107 >59 mL/min/1.73    BUN/Creatinine Ratio 15 12 - 28    Sodium 134 134 - 144 mmol/L    Potassium 4.9 3.5 - 5.2 mmol/L    Chloride 95 (L) 96 - 106 mmol/L    Total CO2 25 20 - 29 mmol/L    Calcium 9.6 8.7 - 10.3 mg/dL    Total Protein 8.0 6.0 - 8.5 g/dL    Albumin 4.4 3.8 - 4.8 g/dL    Globulin 3.6 1.5 - 4.5 g/dL    A/G Ratio 1.2 1.2 - 2.2    Total Bilirubin 0.5 0.0 - 1.2 mg/dL    Alkaline Phosphatase 58 44 - 121 IU/L    AST (SGOT) 21 0 - 40 IU/L    ALT (SGPT) 13 0 - 32 IU/L   Lipid Panel With / Chol / HDL Ratio    Specimen: Blood   Result Value Ref Range    Total Cholesterol 284 (H) 100 - 199 mg/dL    Triglycerides 209 (H) 0 - 149 mg/dL    HDL Cholesterol 61 >39 mg/dL    VLDL Cholesterol Jose 39 5 - 40 mg/dL    LDL Chol Calc (NIH) 184 (H) 0 - 99 mg/dL    Chol/HDL Ratio 4.7 (H) 0.0 - 4.4 ratio   Hemoglobin A1c    Specimen: Blood   Result Value Ref Range     Hemoglobin A1C 5.7 (H) 4.8 - 5.6 %   TSH    Specimen: Blood   Result Value Ref Range    TSH 0.641 0.450 - 4.500 uIU/mL   Vitamin B12    Specimen: Blood   Result Value Ref Range    Vitamin B-12 1,142 232 - 1,245 pg/mL   Magnesium    Specimen: Blood   Result Value Ref Range    Magnesium 2.1 1.6 - 2.3 mg/dL   Phosphorus    Specimen: Blood   Result Value Ref Range    Phosphorus 4.3 3.0 - 4.3 mg/dL   CBC & Differential    Specimen: Blood   Result Value Ref Range    WBC 5.0 3.4 - 10.8 x10E3/uL    RBC 4.11 3.77 - 5.28 x10E6/uL    Hemoglobin 13.1 11.1 - 15.9 g/dL    Hematocrit 40.0 34.0 - 46.6 %    MCV 97 79 - 97 fL    MCH 31.9 26.6 - 33.0 pg    MCHC 32.8 31.5 - 35.7 g/dL    RDW 12.0 11.7 - 15.4 %    Platelets 338 150 - 450 x10E3/uL    Neutrophil Rel % 39 Not Estab. %    Lymphocyte Rel % 42 Not Estab. %    Monocyte Rel % 13 Not Estab. %    Eosinophil Rel % 5 Not Estab. %    Basophil Rel % 1 Not Estab. %    Neutrophils Absolute 2.0 1.4 - 7.0 x10E3/uL    Lymphocytes Absolute 2.1 0.7 - 3.1 x10E3/uL    Monocytes Absolute 0.6 0.1 - 0.9 x10E3/uL    Eosinophils Absolute 0.2 0.0 - 0.4 x10E3/uL    Basophils Absolute 0.1 0.0 - 0.2 x10E3/uL    Immature Granulocyte Rel % 0 Not Estab. %    Immature Grans Absolute 0.0 0.0 - 0.1 x10E3/uL       Assessment/Plan   Diagnoses and all orders for this visit:    1. Encounter for immunization (Primary)  -     Fluzone High-Dose 65+yrs (4542-0152)    2. Hyperlipidemia, unspecified hyperlipidemia type  -     Comprehensive Metabolic Panel; Future  -     Lipid Panel With / Chol / HDL Ratio; Future    3. Hypothyroidism, unspecified type  -     CBC & Differential; Future  -     TSH; Future  -     T4, Free; Future    4. Depression, unspecified depression type    5. Age-related osteoporosis without current pathological fracture  -     DEXA Bone Density Axial; Future    6. Ulcerative pancolitis (HCC)    7. WPW (Liseth-Parkinson-White syndrome)    8. Rheumatoid arthritis involving multiple sites with positive  rheumatoid factor (HCC)    9. TIA (transient ischemic attack)    10. Routine health maintenance  -     Hemoglobin A1c; Future  -     Vitamin B12; Future    11. Encounter for screening mammogram for malignant neoplasm of breast  -     Mammo Screening Bilateral With CAD; Future    1. Hyperlipidemia.  Off statin.  Continue lifestyle measures.  Consider trial of another statin.    2. Hypothyroidism.  Adequately replaced.  Labs reviewed.    3. Depression.  Controlled.  Continue venlafaxine and lifestyle measures.     4. Osteoporosis.  Continue Prolia.  Dexa scan due and is ordered.    5. Ulcerative colitis.  Continue per Dr. Saldaña.  She has an appointment with him this month.    6. Liseth-Parkinson-White.  She saw cardiology before her knee replacements.    7. RA.  Controlled with Remicade.    8. TIA.  Continue ASA 81 mg.  She does not tolerate statins.  Considering Zetia or another trial of statin.    9. Routine health maint.  Prevnar and Pneumovax UTD.  Mammogram ordered.  Covid-19 vaccine done.  Had both doses of Shingrix at pharmacy.  Had both doses.      Outpatient Medications Prior to Visit   Medication Sig Dispense Refill   • Acetaminophen (TYLENOL ARTHRITIS PAIN PO) Take 1 tablet by mouth 3 (Three) Times a Day. AS NEEDED FOR ARTHRITIS     • aspirin 81 MG chewable tablet Chew 81 mg Daily.     • Calcium Carb-Cholecalciferol (CALCIUM PLUS VITAMIN D3 PO) Take 1,400 mg by mouth Daily. D3 = 1500 IU     • cetirizine (ZyrTEC) 10 MG tablet Take 10 mg by mouth daily.     • denosumab (Prolia) 60 MG/ML solution prefilled syringe syringe Inject  under the skin into the appropriate area as directed 1 (One) Time.     • InFLIXimab (REMICADE IV) Infuse 1 dose into a venous catheter Every 30 (Thirty) Days. Every 60 days.  Dr. Saldaña.     • levothyroxine (SYNTHROID, LEVOTHROID) 100 MCG tablet TAKE ONE TABLET BY MOUTH DAILY 90 tablet 1   • Multiple Vitamins-Minerals (MULTIVITAMIN WITH MINERALS) tablet tablet Take 1 tablet by  mouth Daily.     • omeprazole (priLOSEC) 20 MG capsule TAKE ONE CAPSULE BY MOUTH DAILY 90 capsule 1   • venlafaxine XR (EFFEXOR-XR) 75 MG 24 hr capsule TAKE ONE CAPSULE BY MOUTH DAILY 90 capsule 1     No facility-administered medications prior to visit.     No orders of the defined types were placed in this encounter.    [unfilled]  There are no discontinued medications.    Return in about 6 months (around 5/4/2022) for Medicare Wellness.

## 2021-11-04 NOTE — NURSING NOTE
0810  Pt here ambulatory to [facility] for Prolia injection.  Pt already had previous bloodwork  0835  Pt discharged ambulatory.  Pt does not want paperwork but appt changed per pt request for next Prolia and appt card given.  Pt denies any adverse effects from injection.

## 2021-11-04 NOTE — PROGRESS NOTES
Injection  Injection performed by Brittni Short MA. Patient tolerated the procedure well without complications.  11/04/21   Brittni Short MA

## 2021-11-04 NOTE — PATIENT INSTRUCTIONS
"Call [facility] at [phone] if you have any problems or concerns.    We know you have a Choice in healthcare and appreciate you using [facility].  Our purpose is to provide you \"Excellent Care\".  We hope that you will always choose us in the future and continue to recommend us to your family and friends.    Denosumab injection  What is this medicine?  DENOSUMAB (den oh rosalio mab) slows bone breakdown. Prolia is used to treat osteoporosis in women after menopause and in men, and in people who are taking corticosteroids for 6 months or more. Xgeva is used to treat a high calcium level due to cancer and to prevent bone fractures and other bone problems caused by multiple myeloma or cancer bone metastases. Xgeva is also used to treat giant cell tumor of the bone.  This medicine may be used for other purposes; ask your health care provider or pharmacist if you have questions.  COMMON BRAND NAME(S): Prolia, XGEVA  What should I tell my health care provider before I take this medicine?  They need to know if you have any of these conditions:  · dental disease  · having surgery or tooth extraction  · infection  · kidney disease  · low levels of calcium or Vitamin D in the blood  · malnutrition  · on hemodialysis  · skin conditions or sensitivity  · thyroid or parathyroid disease  · an unusual reaction to denosumab, other medicines, foods, dyes, or preservatives  · pregnant or trying to get pregnant  · breast-feeding  How should I use this medicine?  This medicine is for injection under the skin. It is given by a health care professional in a hospital or clinic setting.  A special MedGuide will be given to you before each treatment. Be sure to read this information carefully each time.  For Prolia, talk to your pediatrician regarding the use of this medicine in children. Special care may be needed. For Xgeva, talk to your pediatrician regarding the use of this medicine in children. " While this drug may be prescribed for children as young as 13 years for selected conditions, precautions do apply.  Overdosage: If you think you have taken too much of this medicine contact a poison control center or emergency room at once.  NOTE: This medicine is only for you. Do not share this medicine with others.  What if I miss a dose?  It is important not to miss your dose. Call your doctor or health care professional if you are unable to keep an appointment.  What may interact with this medicine?  Do not take this medicine with any of the following medications:  · other medicines containing denosumab  This medicine may also interact with the following medications:  · medicines that lower your chance of fighting infection  · steroid medicines like prednisone or cortisone  This list may not describe all possible interactions. Give your health care provider a list of all the medicines, herbs, non-prescription drugs, or dietary supplements you use. Also tell them if you smoke, drink alcohol, or use illegal drugs. Some items may interact with your medicine.  What should I watch for while using this medicine?  Visit your doctor or health care professional for regular checks on your progress. Your doctor or health care professional may order blood tests and other tests to see how you are doing.  Call your doctor or health care professional for advice if you get a fever, chills or sore throat, or other symptoms of a cold or flu. Do not treat yourself. This drug may decrease your body's ability to fight infection. Try to avoid being around people who are sick.  You should make sure you get enough calcium and vitamin D while you are taking this medicine, unless your doctor tells you not to. Discuss the foods you eat and the vitamins you take with your health care professional.  See your dentist regularly. Brush and floss your teeth as directed. Before you have any dental work done, tell your dentist you are receiving  this medicine.  Do not become pregnant while taking this medicine or for 5 months after stopping it. Talk with your doctor or health care professional about your birth control options while taking this medicine. Women should inform their doctor if they wish to become pregnant or think they might be pregnant. There is a potential for serious side effects to an unborn child. Talk to your health care professional or pharmacist for more information.  What side effects may I notice from receiving this medicine?  Side effects that you should report to your doctor or health care professional as soon as possible:  · allergic reactions like skin rash, itching or hives, swelling of the face, lips, or tongue  · bone pain  · breathing problems  · dizziness  · jaw pain, especially after dental work  · redness, blistering, peeling of the skin  · signs and symptoms of infection like fever or chills; cough; sore throat; pain or trouble passing urine  · signs of low calcium like fast heartbeat, muscle cramps or muscle pain; pain, tingling, numbness in the hands or feet; seizures  · unusual bleeding or bruising  · unusually weak or tired  Side effects that usually do not require medical attention (report to your doctor or health care professional if they continue or are bothersome):  · constipation  · diarrhea  · headache  · joint pain  · loss of appetite  · muscle pain  · runny nose  · tiredness  · upset stomach  This list may not describe all possible side effects. Call your doctor for medical advice about side effects. You may report side effects to FDA at 6-340-FDA-4946.  Where should I keep my medicine?  This medicine is only given in a clinic, doctor's office, or other health care setting and will not be stored at home.  NOTE: This sheet is a summary. It may not cover all possible information. If you have questions about this medicine, talk to your doctor, pharmacist, or health care provider.  © 2021 [employer]/Gold Standard  (2019-04-26 16:10:44)

## 2021-11-22 ENCOUNTER — LAB (OUTPATIENT)
Dept: LAB | Facility: HOSPITAL | Age: 71
End: 2021-11-22

## 2021-11-22 ENCOUNTER — OFFICE VISIT (OUTPATIENT)
Dept: GASTROENTEROLOGY | Facility: CLINIC | Age: 71
End: 2021-11-22

## 2021-11-22 VITALS
BODY MASS INDEX: 33.57 KG/M2 | SYSTOLIC BLOOD PRESSURE: 128 MMHG | HEIGHT: 62 IN | DIASTOLIC BLOOD PRESSURE: 74 MMHG | WEIGHT: 182.4 LBS

## 2021-11-22 DIAGNOSIS — M05.79 RHEUMATOID ARTHRITIS INVOLVING MULTIPLE SITES WITH POSITIVE RHEUMATOID FACTOR (HCC): ICD-10-CM

## 2021-11-22 DIAGNOSIS — M81.0 AGE-RELATED OSTEOPOROSIS WITHOUT CURRENT PATHOLOGICAL FRACTURE: ICD-10-CM

## 2021-11-22 DIAGNOSIS — K51.00 ULCERATIVE PANCOLITIS (HCC): Primary | ICD-10-CM

## 2021-11-22 DIAGNOSIS — K21.00 GASTROESOPHAGEAL REFLUX DISEASE WITH ESOPHAGITIS WITHOUT HEMORRHAGE: ICD-10-CM

## 2021-11-22 DIAGNOSIS — R10.13 DYSPEPSIA: ICD-10-CM

## 2021-11-22 PROCEDURE — 99214 OFFICE O/P EST MOD 30 MIN: CPT | Performed by: INTERNAL MEDICINE

## 2021-11-22 PROCEDURE — 86677 HELICOBACTER PYLORI ANTIBODY: CPT

## 2021-11-22 PROCEDURE — 36415 COLL VENOUS BLD VENIPUNCTURE: CPT

## 2021-11-22 NOTE — PROGRESS NOTES
PATIENT INFORMATION  Yelena Rangel       - 1950    CHIEF COMPLAINT  Chief Complaint   Patient presents with   • Ulcerative Colitis       HISTORY OF PRESENT ILLNESS  Had a scare  With 2 days of Diarrhea but resolved and overall is doing well.    No Abx but did have BLQ discomfort    Is on remicade and due in 3 weeks    Lat labs were 2019 so will repeat and her last PE labs look great.      REVIEWED PERTINENT RESULTS/ LABS  Lab Results   Component Value Date    CASEREPORT  2017     Surgical Pathology Report                         Case: [id number]                                  Authorizing Provider:  Dl Saldaña, Collected:           2017 01:32 PM                                 MD                                                                           Ordering Location:     [facility]   Received:            2017 02:47 PM                                 OR                                                                           Pathologist:           Naveen Peraza MD                                                          Specimen:    Large Intestine, Rectum                                                                    FINALDX  2017     Testing performed at outside laboratory. See scanned report.         Lab Results   Component Value Date    HGB 13.1 10/28/2021    MCV 97 10/28/2021     10/28/2021    ALT 13 10/28/2021    AST 21 10/28/2021    HGBA1C 5.7 (H) 10/28/2021    INR 1.08 03/10/2020    TRIG 209 (H) 10/28/2021      No results found.    REVIEW OF SYSTEMS  Review of Systems   Constitutional: Positive for unexpected weight change.   Eyes: Negative.    Respiratory: Positive for cough.    Cardiovascular: Positive for palpitations.   Gastrointestinal: Positive for abdominal distention and abdominal pain.   Endocrine: Negative.    Genitourinary: Negative.    Musculoskeletal: Positive for joint swelling.   Skin: Negative.     Allergic/Immunologic: Negative.    Neurological: Positive for headaches.   Hematological: Negative.    Psychiatric/Behavioral: Negative.          ACTIVE PROBLEMS  Patient Active Problem List    Diagnosis    • Ulcerative pancolitis (HCC) [K51.00]    • Acute upper respiratory infection [J06.9]    • Gastroesophageal reflux disease with esophagitis [K21.00]    • GERD (gastroesophageal reflux disease) [K21.9]    • Hematuria [R31.9]    • Osteoarthritis of knee [M17.10]    • Plantar fasciitis [M72.2]    • Routine health maintenance [Z00.00]    • Rheumatoid arthritis involving multiple sites with positive rheumatoid factor (HCC) [M05.79]    • TIA (transient ischemic attack) [G45.9]    • Preop cardiovascular exam [Z01.810]    • Leukopenia [D72.819]    • Depression [F32.A]    • Hyperlipidemia [E78.5]    • Hypothyroidism [E03.9]    • Age-related osteoporosis without current pathological fracture [M81.0]    • WPW (Liseth-Parkinson-White syndrome) [I45.6]    • Non-specific colitis [K52.9]    • Allergic rhinitis [J30.9]    • Primary osteoarthritis of knees, bilateral [M17.0]          PAST MEDICAL HISTORY  Past Medical History:   Diagnosis Date   • Anxiety    • Arthritis    • Arthritis    • Colon polyp    • Disease of thyroid gland    • Environmental allergies    • GERD (gastroesophageal reflux disease)    • Hyperlipidemia    • Hypothyroidism    • Osteopenia    • Rheumatoid arthritis (HCC)    • Shingles    • TIA (transient ischemic attack)    • 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)    • Ulcerative colitis (HCC)    • WPW (Liseth-Parkinson-White syndrome)          SURGICAL HISTORY  Past Surgical History:   Procedure Laterality Date   • COLONOSCOPY N/A 5/13/2016    Procedure: COLONOSCOPY with biopsies;  Surgeon: Dl Saldaña MD;  Location: [facility];  Service:    • COLONOSCOPY W/ POLYPECTOMY     • HYSTERECTOMY     • REPLACEMENT TOTAL KNEE Left 10/09/2018    [facility], Dr. Montes  Sunil, surgeon   • SIGMOIDOSCOPY N/A 12/13/2017    Procedure: SIGMOIDOSCOPY FLEXIBLE with biopsy;  Surgeon: Dl Saldaña MD;  Location: [facility];  Service:    • THYROID LOBECTOMY Right    • TOTAL KNEE ARTHROPLASTY Right 11/20/2018         FAMILY HISTORY  Family History   Problem Relation Age of Onset   • Breast cancer Mother    • Breast cancer Sister    • Colon cancer Father    • Colon cancer Brother          SOCIAL HISTORY  Social History     Occupational History   • Not on file   Tobacco Use   • Smoking status: Never Smoker   • Smokeless tobacco: Never Used   Vaping Use   • Vaping Use: Never used   Substance and Sexual Activity   • Alcohol use: Yes     Alcohol/week: 10.0 standard drinks     Types: 10 Cans of beer per week     Comment: twice week//caffeine yes   • Drug use: No   • Sexual activity: Defer         CURRENT MEDICATIONS    Current Outpatient Medications:   •  Acetaminophen (TYLENOL ARTHRITIS PAIN PO), Take 1 tablet by mouth 3 (Three) Times a Day. AS NEEDED FOR ARTHRITIS, Disp: , Rfl:   •  aspirin 81 MG chewable tablet, Chew 81 mg Daily., Disp: , Rfl:   •  Calcium Carb-Cholecalciferol (CALCIUM PLUS VITAMIN D3 PO), Take 1,400 mg by mouth Daily. D3 = 1500 IU, Disp: , Rfl:   •  cetirizine (ZyrTEC) 10 MG tablet, Take 10 mg by mouth daily., Disp: , Rfl:   •  denosumab (Prolia) 60 MG/ML solution prefilled syringe syringe, Inject  under the skin into the appropriate area as directed 1 (One) Time., Disp: , Rfl:   •  InFLIXimab (REMICADE IV), Infuse 1 dose into a venous catheter Every 30 (Thirty) Days. Every 60 days.  Dr. Saldaña., Disp: , Rfl:   •  levothyroxine (SYNTHROID, LEVOTHROID) 100 MCG tablet, TAKE ONE TABLET BY MOUTH DAILY, Disp: 90 tablet, Rfl: 1  •  Multiple Vitamins-Minerals (MULTIVITAMIN WITH MINERALS) tablet tablet, Take 1 tablet by mouth Daily., Disp: , Rfl:   •  omeprazole (priLOSEC) 20 MG capsule, TAKE ONE CAPSULE BY MOUTH DAILY, Disp: 90 capsule, Rfl: 1  •  venlafaxine XR  "(EFFEXOR-XR) 75 MG 24 hr capsule, TAKE ONE CAPSULE BY MOUTH DAILY, Disp: 90 capsule, Rfl: 1    ALLERGIES  Sulfa antibiotics    VITALS  Vitals:    11/22/21 1307   BP: 128/74   BP Location: Left arm   Patient Position: Sitting   Cuff Size: Large Adult   Weight: 82.7 kg (182 lb 6.4 oz)   Height: 157.5 cm (62.01\")       PHYSICAL EXAM  Debilities/Disabilities Identified: None  Emotional Behavior: Appropriate  Wt Readings from Last 3 Encounters:   11/22/21 82.7 kg (182 lb 6.4 oz)   11/04/21 81.6 kg (180 lb)   10/13/21 79.8 kg (176 lb)     Ht Readings from Last 1 Encounters:   11/22/21 157.5 cm (62.01\")     Body mass index is 33.35 kg/m².  Physical Exam  Constitutional:       Appearance: She is well-developed. She is not diaphoretic.   Eyes:      General: No scleral icterus.     Conjunctiva/sclera: Conjunctivae normal.      Pupils: Pupils are equal, round, and reactive to light.   Neck:      Thyroid: No thyromegaly.   Cardiovascular:      Rate and Rhythm: Normal rate and regular rhythm.      Heart sounds: Normal heart sounds. No murmur heard.  No gallop.    Pulmonary:      Effort: Pulmonary effort is normal.      Breath sounds: Normal breath sounds. No wheezing or rales.   Abdominal:      General: Bowel sounds are normal. There is no distension or abdominal bruit.      Palpations: Abdomen is soft. There is no shifting dullness, fluid wave or mass.      Tenderness: There is abdominal tenderness in the right upper quadrant, epigastric area and left upper quadrant. There is no guarding. Negative signs include Sorto's sign.      Hernia: There is no hernia in the ventral area.   Musculoskeletal:         General: Normal range of motion.      Cervical back: Normal range of motion and neck supple.   Lymphadenopathy:      Cervical: No cervical adenopathy.   Skin:     General: Skin is warm and dry.      Findings: No erythema or rash.   Neurological:      Mental Status: She is alert and oriented to person, place, and time. "         CLINICAL DATA REVIEWED   reviewed previous lab results and integrated with today's visit, reviewed notes from other physicians and/or last GI encounter, reviewed previous endoscopy results and available photos, reviewed surgical pathology results from previous biopsies    ASSESSMENT  Diagnoses and all orders for this visit:    Ulcerative pancolitis (HCC)    Gastroesophageal reflux disease with esophagitis without hemorrhage    Rheumatoid arthritis involving multiple sites with positive rheumatoid factor (HCC)    Age-related osteoporosis without current pathological fracture    Dyspepsia  -     Helicobacter Pylori, IgA IgG IgM; Future          PLAN  Last colon was 2017 so will discuss scheduling in 6 months  Check labs and see in 6 months  Return in about 6 months (around 5/22/2022).    I have discussed the above plan with the patient.  They verbalize understanding and are in agreement with the plan.  They have been advised to contact the office for any questions, concerns, or changes related to their health.

## 2021-11-23 ENCOUNTER — PATIENT ROUNDING (BHMG ONLY) (OUTPATIENT)
Dept: GASTROENTEROLOGY | Facility: CLINIC | Age: 71
End: 2021-11-23

## 2021-11-23 LAB
H PYLORI IGA SER-ACNC: <9 UNITS (ref 0–8.9)
H PYLORI IGG SER IA-ACNC: 0.46 INDEX VALUE (ref 0–0.79)
H PYLORI IGM SER-ACNC: <9 UNITS (ref 0–8.9)

## 2021-11-23 NOTE — PROGRESS NOTES
November 23, 2021    Hello, may I speak with Yelena Rangel?    My name is Narda Fernandez      I am  with [facility]  [address]  [facility] [address] [phone].    Before we get started may I verify your date of birth? 1950    I am calling to officially welcome you to our practice and ask about your recent visit. Is this a good time to talk? yes    Tell me about your visit with us. What things went well?  He always takes good care of me.  Everything was fine as usual.       We're always looking for ways to make our patients' experiences even better. Do you have recommendations on ways we may improve?  no    Overall were you satisfied with your first visit to our practice? yes       I appreciate you taking the time to speak with me today. Is there anything else I can do for you? no      Thank you, and have a great day.

## 2021-11-24 DIAGNOSIS — E03.9 HYPOTHYROIDISM, UNSPECIFIED TYPE: ICD-10-CM

## 2021-11-24 RX ORDER — LEVOTHYROXINE SODIUM 0.1 MG/1
100 TABLET ORAL DAILY
Qty: 90 TABLET | Refills: 1 | Status: SHIPPED | OUTPATIENT
Start: 2021-11-24 | End: 2022-06-01 | Stop reason: SDUPTHER

## 2021-12-01 DIAGNOSIS — K21.00 GASTROESOPHAGEAL REFLUX DISEASE WITH ESOPHAGITIS WITHOUT HEMORRHAGE: Primary | ICD-10-CM

## 2021-12-01 RX ORDER — OMEPRAZOLE 20 MG/1
20 CAPSULE, DELAYED RELEASE ORAL DAILY
Qty: 90 CAPSULE | Refills: 1 | Status: SHIPPED | OUTPATIENT
Start: 2021-12-01 | End: 2022-06-01 | Stop reason: SDUPTHER

## 2021-12-15 ENCOUNTER — HOSPITAL ENCOUNTER (OUTPATIENT)
Dept: INFUSION THERAPY | Facility: HOSPITAL | Age: 71
Discharge: HOME | End: 2021-12-15

## 2021-12-15 ENCOUNTER — HOSPITAL ENCOUNTER (OUTPATIENT)
Dept: MAMMOGRAPHY | Facility: HOSPITAL | Age: 71
Discharge: HOME | End: 2021-12-15

## 2021-12-15 ENCOUNTER — APPOINTMENT (OUTPATIENT)
Dept: BONE DENSITY | Facility: HOSPITAL | Age: 71
End: 2021-12-15

## 2021-12-15 VITALS
WEIGHT: 178 LBS | BODY MASS INDEX: 32.55 KG/M2 | TEMPERATURE: 98.4 F | RESPIRATION RATE: 16 BRPM | SYSTOLIC BLOOD PRESSURE: 149 MMHG | HEART RATE: 71 BPM | DIASTOLIC BLOOD PRESSURE: 88 MMHG | OXYGEN SATURATION: 96 %

## 2021-12-15 DIAGNOSIS — K51.20 ULCERATIVE PROCTITIS WITHOUT COMPLICATION (HCC): Primary | ICD-10-CM

## 2021-12-15 DIAGNOSIS — Z11.1 SCREENING FOR TUBERCULOSIS: ICD-10-CM

## 2021-12-15 DIAGNOSIS — Z12.31 ENCOUNTER FOR SCREENING MAMMOGRAM FOR MALIGNANT NEOPLASM OF BREAST: ICD-10-CM

## 2021-12-15 DIAGNOSIS — K51.00 ULCERATIVE PANCOLITIS (HCC): ICD-10-CM

## 2021-12-15 DIAGNOSIS — Z11.59 NEED FOR HEPATITIS C SCREENING TEST: ICD-10-CM

## 2021-12-15 DIAGNOSIS — Z11.59 NEED FOR HEPATITIS B SCREENING TEST: ICD-10-CM

## 2021-12-15 DIAGNOSIS — M81.0 AGE-RELATED OSTEOPOROSIS WITHOUT CURRENT PATHOLOGICAL FRACTURE: ICD-10-CM

## 2021-12-15 PROCEDURE — 86704 HEP B CORE ANTIBODY TOTAL: CPT | Performed by: INTERNAL MEDICINE

## 2021-12-15 PROCEDURE — 77063 BREAST TOMOSYNTHESIS BI: CPT

## 2021-12-15 PROCEDURE — 96415 CHEMO IV INFUSION ADDL HR: CPT

## 2021-12-15 PROCEDURE — 86705 HEP B CORE ANTIBODY IGM: CPT | Performed by: INTERNAL MEDICINE

## 2021-12-15 PROCEDURE — 77067 SCR MAMMO BI INCL CAD: CPT

## 2021-12-15 PROCEDURE — 77080 DXA BONE DENSITY AXIAL: CPT

## 2021-12-15 PROCEDURE — 86480 TB TEST CELL IMMUN MEASURE: CPT | Performed by: INTERNAL MEDICINE

## 2021-12-15 PROCEDURE — 25010000002 INFLIXIMAB PER 10 MG: Performed by: INTERNAL MEDICINE

## 2021-12-15 PROCEDURE — 96413 CHEMO IV INFUSION 1 HR: CPT

## 2021-12-15 RX ADMIN — INFLIXIMAB 800 MG: 100 INJECTION, POWDER, LYOPHILIZED, FOR SOLUTION INTRAVENOUS at 08:52

## 2021-12-15 NOTE — NURSING NOTE
NURSING PROGRESS NOTE:    PATIENT ARRIVE TO [facility] FOR SCHEDULED INFUSION AT 0815 .  PROCEDURE WAS PERFORMED WITHOUT INCIDENT.   PATIENT DISCHARGED HOME AT 1110 . KAILYN LEE

## 2021-12-16 LAB
HBV CORE AB SERPL QL IA: NEGATIVE
HBV CORE IGM SERPL QL IA: NEGATIVE

## 2021-12-17 LAB
GAMMA INTERFERON BACKGROUND BLD IA-ACNC: 0.06 IU/ML
M TB IFN-G BLD-IMP: NEGATIVE
M TB IFN-G CD4+ BCKGRND COR BLD-ACNC: 0.06 IU/ML
M TB IFN-G CD4+CD8+ BCKGRND COR BLD-ACNC: 0.06 IU/ML
MITOGEN IGNF BLD-ACNC: >10 IU/ML
QUANTIFERON INCUBATION: NORMAL
SERVICE CMNT-IMP: NORMAL

## 2021-12-20 RX ORDER — VENLAFAXINE HYDROCHLORIDE 75 MG/1
75 CAPSULE, EXTENDED RELEASE ORAL DAILY
Qty: 90 CAPSULE | Refills: 1 | Status: SHIPPED | OUTPATIENT
Start: 2021-12-20 | End: 2022-06-01 | Stop reason: SDUPTHER

## 2021-12-20 NOTE — TELEPHONE ENCOUNTER
Caller: Juan Carlos Yelena TYE    Relationship: Self    Best call back number: [phone]     Requested Prescriptions:   Requested Prescriptions     Pending Prescriptions Disp Refills   • venlafaxine XR (EFFEXOR-XR) 75 MG 24 hr capsule 90 capsule 1     Sig: Take 1 capsule by mouth Daily.        Pharmacy where request should be sent: [facility] [address] [facility] - [phone]  - [phone] FX     Additional details provided by patient: HAS LESS THAN 3 DAYS.    Does the patient have less than a 3 day supply:  [x] Yes  [] No    Shana Castro, PCT   12/20/21 10:47 EST

## 2022-02-09 ENCOUNTER — HOSPITAL ENCOUNTER (OUTPATIENT)
Dept: INFUSION THERAPY | Facility: HOSPITAL | Age: 72
Setting detail: INFUSION SERIES
Discharge: HOME | End: 2022-02-09

## 2022-02-09 VITALS
HEART RATE: 74 BPM | SYSTOLIC BLOOD PRESSURE: 149 MMHG | OXYGEN SATURATION: 95 % | RESPIRATION RATE: 16 BRPM | BODY MASS INDEX: 32.39 KG/M2 | WEIGHT: 176 LBS | DIASTOLIC BLOOD PRESSURE: 82 MMHG | HEIGHT: 62 IN | TEMPERATURE: 97.5 F

## 2022-02-09 DIAGNOSIS — K51.20 ULCERATIVE PROCTITIS WITHOUT COMPLICATION: Primary | ICD-10-CM

## 2022-02-09 DIAGNOSIS — K51.00 ULCERATIVE PANCOLITIS: ICD-10-CM

## 2022-02-09 PROCEDURE — 96415 CHEMO IV INFUSION ADDL HR: CPT

## 2022-02-09 PROCEDURE — 25010000002 INFLIXIMAB PER 10 MG: Performed by: INTERNAL MEDICINE

## 2022-02-09 PROCEDURE — 96413 CHEMO IV INFUSION 1 HR: CPT

## 2022-02-09 RX ADMIN — INFLIXIMAB 798 MG: 100 INJECTION, POWDER, LYOPHILIZED, FOR SOLUTION INTRAVENOUS at 08:52

## 2022-02-09 NOTE — NURSING NOTE
NURSE PROGRESS NOTE    PT. ARRIVED IN [facility] FOR SCHEDULED INFUSION @ 0830 .  INFUSION WAS PERFORMED WITHOUT INCIDENT.  PT. ISSUED AVS AND VERBALIZES UNDERSTANDING, AND DISCHARGED AT 1115.

## 2022-04-06 ENCOUNTER — HOSPITAL ENCOUNTER (OUTPATIENT)
Dept: INFUSION THERAPY | Facility: HOSPITAL | Age: 72
Discharge: HOME | End: 2022-04-06
Admitting: INTERNAL MEDICINE

## 2022-04-06 VITALS
HEIGHT: 62 IN | BODY MASS INDEX: 32.76 KG/M2 | RESPIRATION RATE: 16 BRPM | SYSTOLIC BLOOD PRESSURE: 135 MMHG | OXYGEN SATURATION: 94 % | WEIGHT: 178 LBS | DIASTOLIC BLOOD PRESSURE: 82 MMHG | TEMPERATURE: 97.6 F | HEART RATE: 70 BPM

## 2022-04-06 DIAGNOSIS — K51.00 ULCERATIVE PANCOLITIS: ICD-10-CM

## 2022-04-06 DIAGNOSIS — K51.20 ULCERATIVE PROCTITIS WITHOUT COMPLICATION: Primary | ICD-10-CM

## 2022-04-06 PROCEDURE — 96413 CHEMO IV INFUSION 1 HR: CPT

## 2022-04-06 PROCEDURE — 25010000002 INFLIXIMAB PER 10 MG: Performed by: INTERNAL MEDICINE

## 2022-04-06 PROCEDURE — 96415 CHEMO IV INFUSION ADDL HR: CPT

## 2022-04-06 RX ADMIN — INFLIXIMAB 800 MG: 100 INJECTION, POWDER, LYOPHILIZED, FOR SOLUTION INTRAVENOUS at 09:28

## 2022-04-06 NOTE — PATIENT INSTRUCTIONS
"Call Dr. Dl Saldaña, Gastroenterology at [phone] if you have any problems or concerns.    We know you have a Choice in healthcare and appreciate you using [facility].  Our purpose is to provide you \"Excellent Care\".  We hope that you will always choose us in the future and continue to recommend us to your family and friends.      "

## 2022-05-11 ENCOUNTER — OFFICE VISIT (OUTPATIENT)
Dept: INTERNAL MEDICINE | Facility: CLINIC | Age: 72
End: 2022-05-11

## 2022-05-11 ENCOUNTER — HOSPITAL ENCOUNTER (OUTPATIENT)
Dept: INFUSION THERAPY | Facility: HOSPITAL | Age: 72
Discharge: HOME | End: 2022-05-11
Admitting: FAMILY MEDICINE

## 2022-05-11 VITALS
HEIGHT: 62 IN | BODY MASS INDEX: 33.68 KG/M2 | SYSTOLIC BLOOD PRESSURE: 120 MMHG | TEMPERATURE: 98.3 F | DIASTOLIC BLOOD PRESSURE: 70 MMHG | OXYGEN SATURATION: 96 % | WEIGHT: 183 LBS | HEART RATE: 77 BPM

## 2022-05-11 VITALS
SYSTOLIC BLOOD PRESSURE: 130 MMHG | TEMPERATURE: 96.8 F | DIASTOLIC BLOOD PRESSURE: 85 MMHG | OXYGEN SATURATION: 96 % | HEART RATE: 80 BPM | RESPIRATION RATE: 16 BRPM

## 2022-05-11 DIAGNOSIS — M81.0 AGE-RELATED OSTEOPOROSIS WITHOUT CURRENT PATHOLOGICAL FRACTURE: ICD-10-CM

## 2022-05-11 DIAGNOSIS — M81.0 AGE-RELATED OSTEOPOROSIS WITHOUT CURRENT PATHOLOGICAL FRACTURE: Primary | ICD-10-CM

## 2022-05-11 DIAGNOSIS — G45.9 TIA (TRANSIENT ISCHEMIC ATTACK): ICD-10-CM

## 2022-05-11 DIAGNOSIS — Z00.00 ROUTINE HEALTH MAINTENANCE: ICD-10-CM

## 2022-05-11 DIAGNOSIS — J30.89 NON-SEASONAL ALLERGIC RHINITIS, UNSPECIFIED TRIGGER: ICD-10-CM

## 2022-05-11 DIAGNOSIS — M81.0 OSTEOPOROSIS WITHOUT PATHOLOGICAL FRACTURE: Primary | ICD-10-CM

## 2022-05-11 DIAGNOSIS — I45.6 WPW (WOLFF-PARKINSON-WHITE SYNDROME): ICD-10-CM

## 2022-05-11 DIAGNOSIS — E03.9 HYPOTHYROIDISM, UNSPECIFIED TYPE: ICD-10-CM

## 2022-05-11 DIAGNOSIS — K51.00 ULCERATIVE PANCOLITIS: ICD-10-CM

## 2022-05-11 DIAGNOSIS — E78.5 HYPERLIPIDEMIA, UNSPECIFIED HYPERLIPIDEMIA TYPE: ICD-10-CM

## 2022-05-11 DIAGNOSIS — E55.9 HYPOVITAMINOSIS D: ICD-10-CM

## 2022-05-11 DIAGNOSIS — F32.A DEPRESSION, UNSPECIFIED DEPRESSION TYPE: ICD-10-CM

## 2022-05-11 DIAGNOSIS — Z00.00 MEDICARE ANNUAL WELLNESS VISIT, SUBSEQUENT: Primary | ICD-10-CM

## 2022-05-11 LAB
MAGNESIUM SERPL-MCNC: 1.8 MG/DL (ref 1.6–2.4)
PHOSPHATE SERPL-MCNC: 3.8 MG/DL (ref 2.5–4.5)

## 2022-05-11 PROCEDURE — 96372 THER/PROPH/DIAG INJ SC/IM: CPT

## 2022-05-11 PROCEDURE — 96160 PT-FOCUSED HLTH RISK ASSMT: CPT | Performed by: FAMILY MEDICINE

## 2022-05-11 PROCEDURE — 1159F MED LIST DOCD IN RCRD: CPT | Performed by: FAMILY MEDICINE

## 2022-05-11 PROCEDURE — G0439 PPPS, SUBSEQ VISIT: HCPCS | Performed by: FAMILY MEDICINE

## 2022-05-11 PROCEDURE — 36415 COLL VENOUS BLD VENIPUNCTURE: CPT

## 2022-05-11 PROCEDURE — 99214 OFFICE O/P EST MOD 30 MIN: CPT | Performed by: FAMILY MEDICINE

## 2022-05-11 PROCEDURE — 84100 ASSAY OF PHOSPHORUS: CPT | Performed by: FAMILY MEDICINE

## 2022-05-11 PROCEDURE — 1170F FXNL STATUS ASSESSED: CPT | Performed by: FAMILY MEDICINE

## 2022-05-11 PROCEDURE — 25010000002 DENOSUMAB 60 MG/ML SOLUTION PREFILLED SYRINGE: Performed by: FAMILY MEDICINE

## 2022-05-11 PROCEDURE — 83735 ASSAY OF MAGNESIUM: CPT | Performed by: FAMILY MEDICINE

## 2022-05-11 RX ADMIN — DENOSUMAB 60 MG: 60 INJECTION SUBCUTANEOUS at 08:51

## 2022-05-11 NOTE — NURSING NOTE
0815  Pt here ambulatory to [facility] for Labwork and Prolia injection.  0855  Pt discharged ambulatory with AVS papers with normal calcium, mg and phos.  Pt loren injection without any adverse effects.  Pt going to MD appt and reminded to ask if MD wants to continue Prolia.     NICU Nutrition Assessment    YOB: 2018     Birth Gestational Age: 30w4d  NICU Admission Date: 2019     Growth Parameters at birth: ([place] Growth Chart)  Birth weight: 1.049 kg (2 lb 5 oz) (14.10%)  AGA  Birth length: 39 cm (46.05%)  Birth HC:24.5 cm (1.99%)    Current  DOL: 29 days   Current gestational age: 34w 5d      Current Diagnoses:   Patient Active Problem List   Diagnosis    Prematurity       Respiratory support: Room air    Current Anthropometrics: (Based on ([place] Growth Chart)    Current weight: 1385 g (1%)  Change of 32% since birth  Weight change: 0.005 kg (0.2 oz) in 24h  Average daily weight gain of 8.79 g/kg/day over 7 days   Current Length: Not applicable at this time  Current HC: Not applicable at this time    Current Medications:  Scheduled Meds:   cholecalciferol (vitamin D3)  200 Units Oral Daily    pediatric multivitamin no.81  0.5 mL Oral Daily     Continuous Infusions:    PRN Meds:.zinc oxide    Current Labs:  Lab Results   Component Value Date     2019    K 5.1 2019     (H) 2019    CO2 17 (L) 2019    BUN 6 2019    CREATININE 0.6 2019    CALCIUM 9.2 2019    ANIONGAP 8 2019    ESTGFRAFRICA SEE COMMENT 2019    EGFRNONAA SEE COMMENT 2019     Lab Results   Component Value Date    ALT 9 (L) 2019    AST 36 2019    GGT 95 (H) 2019    ALKPHOS 507 2019    BILITOT 2.1 2019     No results found for: POCTGLUCOSE  Lab Results   Component Value Date    HCT 23.9 (L) 2019     Lab Results   Component Value Date    HGB 8.4 (L) 2019       24 hr intake/output:     Estimated Nutritional needs based on BW and GA:  Initiation: 47-57 kcal/kg/day, 2-2.5 g AA/kg/day, 1-2 g lipid/kg/day, GIR: 4.5-6 mg/kg/min  Advance as tolerated to:  110-130 kcal/kg ( kcal/lkg parenterally)3.8-4.5 g/kg protein (3.2-3.8 parenterally)  135 - 200 mL/kg/day     Nutrition Orders:  Enteral Orders:  Maternal EBM +LHMF 24 kcal/oz 26 mL q3h  Gavage only    Parenteral Orders: Discontinued    Total Nutrition Provided in the last 24 hours:   149 mL/kg/day  118 kcal/kg/day  4.3g protein/kg/day  5.9 g fat/kg/day   12g CHO/kg/day       Nutrition Assessment:  Corry Berg is a 30w4d female, CGA 34w5d today, admitted to the NICU secondary to prematurity. Infant remains on RA; maintaining stable temperatures and vitals. Infant on EBM+HMF via NG. Per neonatologist notes, infant having intermittent emesis with feeds. Regained birth weight by DOL 7. Infant not meeting growth velocity goal x wt this week. Infant is voiding and stooling age appropriately. Will continue to monitor clinically.     Nutrition Diagnosis: Increased calorie and nutrient needs related to prematurity as evidenced by gestational age at birth   Nutrition Diagnosis Status: Ongoing    Nutrition Intervention: Continue current feeding regimen    Nutrition Monitoring and Evaluation:  Patient will meet % of estimated calorie/protein goals (ACHIEVING)  Patient will regain birth weight by DOL 14 (ACHIEVED)  Once birthweight is regained, patient meeting expected weight gain velocity goal (see chart below (NOT ACHIEVING)  Patient will meet expected linear growth velocity goal (see chart below)(NOT APPLICABLE AT THIS TIME)  Patient will meet expected HC growth velocity goal (see chart below) (NOT APPLICABLE AT THIS TIME)        Discharge Planning: Too soon to determine    Follow-up: 1xweekly    Yary Tay RD, LDN  2019

## 2022-05-11 NOTE — PATIENT INSTRUCTIONS
"Call [facility] at [phone]  Juliano Mendez MD if you have any problems or concerns.    We know you have a Choice in healthcare and appreciate you using [facility].  Our purpose is to provide you \"Excellent Care\".  We hope that you will always choose us in the future and continue to recommend us to your family and friends.     "

## 2022-05-11 NOTE — PROGRESS NOTES
Subjective     Yelena Rangel is a 71 y.o. female, who presents with a chief complaint of   Chief Complaint   Patient presents with   • Medicare Wellness-subsequent   • Hypothyroidism       Hyperlipidemia  Exacerbating diseases include hypothyroidism.   Hypothyroidism  Pertinent negatives include no arthralgias.   Depression    1. Hyperlipidemia.  She was on a statin in the past and stopped this due to joint pain.  She is now wondering if this was her RA before it was diagnosed.    2. Ulcerative colitis.  On Remicade every 2 months per Dr. Saldaña.  She has occasional flares.    3. Depression.  Pt reports she is still doing well with venlafaxine.    4. Rheumatoid arthritis. This is also well-controlled with the Remicade.    The following portions of the patient's history were reviewed and updated as appropriate: allergies, current medications, past family history, past medical history, past social history, past surgical history and problem list.    Allergies: Sulfa antibiotics    Review of Systems   Constitutional: Negative.    HENT: Negative.    Eyes: Negative.    Respiratory: Negative.    Cardiovascular: Negative.    Gastrointestinal: Negative.    Endocrine: Negative.    Genitourinary: Negative.    Musculoskeletal: Negative.  Negative for arthralgias.   Skin: Negative.    Allergic/Immunologic: Positive for environmental allergies.   Neurological: Negative.    Hematological: Negative.    Psychiatric/Behavioral: Negative.        Objective     Wt Readings from Last 3 Encounters:   05/11/22 83 kg (183 lb)   04/06/22 80.7 kg (178 lb)   02/09/22 79.8 kg (176 lb)     Temp Readings from Last 3 Encounters:   05/11/22 98.3 °F (36.8 °C)   05/11/22 96.8 °F (36 °C) (Temporal)   04/06/22 97.6 °F (36.4 °C) (Temporal)     BP Readings from Last 3 Encounters:   05/11/22 120/70   05/11/22 130/85   04/06/22 135/82     Pulse Readings from Last 3 Encounters:   05/11/22 77   05/11/22 80   04/06/22 70     Body mass index is 33.59  kg/m².  SpO2 Readings from Last 3 Encounters:   09/27/17 98%   03/29/17 98%   09/28/16 97%       Physical Exam   Constitutional: She is oriented to person, place, and time. She appears well-developed. No distress.   HENT:   Head: Normocephalic and atraumatic.   Mouth/Throat: Mucous membranes are moist.   Eyes: Conjunctivae are normal.   Neck: No thyromegaly present.   Cardiovascular: Normal rate, regular rhythm and normal heart sounds.   Pulmonary/Chest: Effort normal and breath sounds normal. No respiratory distress.   Abdominal: Normal appearance.   Musculoskeletal:      Right lower leg: No edema.      Left lower leg: No edema.   Neurological: She is alert and oriented to person, place, and time.   Skin: Skin is warm and dry.   Psychiatric: Her behavior is normal. Mood normal.   Nursing note and vitals reviewed.      Results for orders placed or performed during the hospital encounter of 05/11/22   Phosphorus    Specimen: Blood   Result Value Ref Range    Phosphorus 3.8 2.5 - 4.5 mg/dL   Magnesium    Specimen: Blood   Result Value Ref Range    Magnesium 1.8 1.6 - 2.4 mg/dL       Assessment/Plan   Diagnoses and all orders for this visit:    1. Medicare annual wellness visit, subsequent (Primary)    2. Hyperlipidemia, unspecified hyperlipidemia type  -     pitavastatin calcium (LIVALO) 1 MG tablet tablet; Take 1 tablet by mouth Every Night.  Dispense: 90 tablet; Refill: 1  -     Comprehensive Metabolic Panel; Future  -     Lipid Panel With / Chol / HDL Ratio; Future    3. Hypothyroidism, unspecified type  -     CBC & Differential; Future  -     TSH; Future  -     T4, Free; Future    4. Depression, unspecified depression type    5. Age-related osteoporosis without current pathological fracture  -     Phosphorus; Future  -     Magnesium; Future    6. Ulcerative pancolitis (HCC)    7. WPW (Liseth-Parkinson-White syndrome)    8. Non-seasonal allergic rhinitis, unspecified trigger    9. TIA (transient ischemic  attack)    10. Routine health maintenance  -     Hemoglobin A1c; Future  -     Vitamin B12; Future    11. Hypovitaminosis D  -     Vitamin D 25 Hydroxy; Future    1. Hyperlipidemia.  Off statin.  Continue lifestyle measures.  Trial of pitivastatin 1 mg daily.    2. Hypothyroidism.  Adequately replaced.  Labs reviewed.    3. Depression.  Controlled.  Continue venlafaxine and lifestyle measures.     4. Osteoporosis.  Continue Prolia.  Dexa scan done 12/2021 showed improvement in bone density.    5. Ulcerative colitis.  Continue per Dr. Saldaña.     6. Liseth-Parkinson-White.  She saw cardiology before her knee replacements.    7. RA.  Controlled with Remicade.    8. TIA.  Continue ASA 81 mg.  She did not tolerate last statin.  Trial of pitivastatin (see #1).    9. Routine health maint.  Prevnar and Pneumovax UTD.  Mammogram UTD.  Covid-19 vaccine done.  Had both doses of Shingrix at pharmacy.      Outpatient Medications Prior to Visit   Medication Sig Dispense Refill   • Acetaminophen (TYLENOL ARTHRITIS PAIN PO) Take 1 tablet by mouth 3 (Three) Times a Day. AS NEEDED FOR ARTHRITIS     • aspirin 81 MG chewable tablet Chew 81 mg Daily.     • Calcium Carb-Cholecalciferol (CALCIUM PLUS VITAMIN D3 PO) Take 1,400 mg by mouth Daily. D3 = 1500 IU     • cetirizine (ZyrTEC) 10 MG tablet Take 10 mg by mouth daily.     • denosumab (PROLIA) 60 MG/ML solution prefilled syringe syringe Inject  under the skin into the appropriate area as directed 1 (One) Time.     • InFLIXimab (REMICADE IV) Infuse 1 dose into a venous catheter Every 30 (Thirty) Days. Every 60 days.  Dr. Saldaña.     • levothyroxine (SYNTHROID, LEVOTHROID) 100 MCG tablet Take 1 tablet by mouth Daily. 90 tablet 1   • Multiple Vitamins-Minerals (MULTIVITAMIN WITH MINERALS) tablet tablet Take 1 tablet by mouth Daily.     • omeprazole (priLOSEC) 20 MG capsule Take 1 capsule by mouth Daily. 90 capsule 1   • venlafaxine XR (EFFEXOR-XR) 75 MG 24 hr capsule Take 1  capsule by mouth Daily. 90 capsule 1     No facility-administered medications prior to visit.     New Medications Ordered This Visit   Medications   • pitavastatin calcium (LIVALO) 1 MG tablet tablet     Sig: Take 1 tablet by mouth Every Night.     Dispense:  90 tablet     Refill:  1     [unfilled]  There are no discontinued medications.    Return in about 6 months (around 11/11/2022).

## 2022-05-11 NOTE — PROGRESS NOTES
The ABCs of the Annual Wellness Visit  Subsequent Medicare Wellness Visit    Chief Complaint   Patient presents with   • Medicare Wellness-subsequent   • Hypothyroidism      Subjective    History of Present Illness:  Yelena Rangel is a 71 y.o. female who presents for a Subsequent Medicare Wellness Visit.    The following portions of the patient's history were reviewed and   updated as appropriate: allergies, current medications, past family history, past medical history, past social history, past surgical history and problem list.    Compared to one year ago, the patient feels her physical   health is worse.    Compared to one year ago, the patient feels her mental   health is the same.    Recent Hospitalizations:  She was not admitted to the hospital during the last year.       Current Medical Providers:  Patient Care Team:  Juliano Mendez MD as PCP - General (Family Medicine)  Dl Saldaña MD as Consulting Physician (Gastroenterology)  Simon Barber MD as Surgeon (Orthopedic Surgery)  Simon Barber MD as Surgeon (Orthopedic Surgery)    Outpatient Medications Prior to Visit   Medication Sig Dispense Refill   • Acetaminophen (TYLENOL ARTHRITIS PAIN PO) Take 1 tablet by mouth 3 (Three) Times a Day. AS NEEDED FOR ARTHRITIS     • aspirin 81 MG chewable tablet Chew 81 mg Daily.     • Calcium Carb-Cholecalciferol (CALCIUM PLUS VITAMIN D3 PO) Take 1,400 mg by mouth Daily. D3 = 1500 IU     • cetirizine (ZyrTEC) 10 MG tablet Take 10 mg by mouth daily.     • denosumab (PROLIA) 60 MG/ML solution prefilled syringe syringe Inject  under the skin into the appropriate area as directed 1 (One) Time.     • InFLIXimab (REMICADE IV) Infuse 1 dose into a venous catheter Every 30 (Thirty) Days. Every 60 days.  Dr. Saldaña.     • levothyroxine (SYNTHROID, LEVOTHROID) 100 MCG tablet Take 1 tablet by mouth Daily. 90 tablet 1   • Multiple Vitamins-Minerals (MULTIVITAMIN WITH MINERALS) tablet tablet  "Take 1 tablet by mouth Daily.     • omeprazole (priLOSEC) 20 MG capsule Take 1 capsule by mouth Daily. 90 capsule 1   • venlafaxine XR (EFFEXOR-XR) 75 MG 24 hr capsule Take 1 capsule by mouth Daily. 90 capsule 1     No facility-administered medications prior to visit.       No opioid medication identified on active medication list. I have reviewed chart for other potential  high risk medication/s and harmful drug interactions in the elderly.          Aspirin is on active medication list. Aspirin use is indicated based on review of current medical condition/s. Pros and cons of this therapy have been discussed today. Benefits of this medication outweigh potential harm.  Patient has been encouraged to continue taking this medication.  .      Patient Active Problem List   Diagnosis   • Primary osteoarthritis of knees, bilateral   • Depression   • Hyperlipidemia   • Hypothyroidism   • Age-related osteoporosis without current pathological fracture   • WPW (Liseth-Parkinson-White syndrome)   • Non-specific colitis   • Allergic rhinitis   • Leukopenia   • TIA (transient ischemic attack)   • Rheumatoid arthritis involving multiple sites with positive rheumatoid factor (HCC)   • Preop cardiovascular exam   • Acute upper respiratory infection   • Gastroesophageal reflux disease with esophagitis   • GERD (gastroesophageal reflux disease)   • Hematuria   • Osteoarthritis of knee   • Plantar fasciitis   • Routine health maintenance   • Ulcerative pancolitis (HCC)     Advance Care Planning  Advance Directive is not on file.  ACP discussion was held with the patient during this visit. Patient does not have an advance directive, information provided.          Objective    Vitals:    05/11/22 0940   BP: 120/70   Pulse: 77   Temp: 98.3 °F (36.8 °C)   SpO2: 96%   Weight: 83 kg (183 lb)   Height: 157.2 cm (61.89\")     BMI Readings from Last 1 Encounters:   05/11/22 33.59 kg/m²   BMI is above normal parameters. Recommendations include: " nutrition counseling    Does the patient have evidence of cognitive impairment? Yes    Physical Exam  Lab Results   Component Value Date    CHLPL 299 (H) 04/27/2022    TRIG 223 (H) 04/27/2022    HDL 61 04/27/2022     (H) 04/27/2022    VLDL 43 (H) 04/27/2022    HGBA1C 5.6 04/27/2022            HEALTH RISK ASSESSMENT    Smoking Status:  Social History     Tobacco Use   Smoking Status Never Smoker   Smokeless Tobacco Never Used     Alcohol Consumption:  Social History     Substance and Sexual Activity   Alcohol Use Yes   • Alcohol/week: 10.0 standard drinks   • Types: 10 Cans of beer per week    Comment: twice week//caffeine yes     Fall Risk Screen:    ASUNCIONADI Fall Risk Assessment was completed, and patient is at LOW risk for falls.Assessment completed on:5/11/2022    Depression Screening:  PHQ-2/PHQ-9 Depression Screening 5/11/2022   Retired PHQ-9 Total Score -   Retired Total Score -   Little Interest or Pleasure in Doing Things 0-->not at all   Feeling Down, Depressed or Hopeless 0-->not at all   PHQ-9: Brief Depression Severity Measure Score 0       Health Habits and Functional and Cognitive Screening:  Functional & Cognitive Status 5/11/2022   Do you have difficulty preparing food and eating? No   Do you have difficulty bathing yourself, getting dressed or grooming yourself? No   Do you have difficulty using the toilet? No   Do you have difficulty moving around from place to place? No   Do you have trouble with steps or getting out of a bed or a chair? No   Current Diet Well Balanced Diet   Dental Exam Not up to date   Eye Exam Up to date   Exercise (times per week) 2 times per week   Current Exercises Include Walking   Current Exercise Activities Include -   Do you need help using the phone?  No   Are you deaf or do you have serious difficulty hearing?  No   Do you need help with transportation? No   Do you need help shopping? No   Do you need help preparing meals?  No   Do you need help with housework?   No   Do you need help with laundry? No   Do you need help taking your medications? No   Do you need help managing money? No   Do you ever drive or ride in a car without wearing a seat belt? No   Have you felt unusual stress, anger or loneliness in the last month? No   Who do you live with? Spouse   If you need help, do you have trouble finding someone available to you? No   Have you been bothered in the last four weeks by sexual problems? No   Do you have difficulty concentrating, remembering or making decisions? No       Age-appropriate Screening Schedule:  Refer to the list below for future screening recommendations based on patient's age, sex and/or medical conditions. Orders for these recommended tests are listed in the plan section. The patient has been provided with a written plan.    Health Maintenance   Topic Date Due   • TDAP/TD VACCINES (1 - Tdap) Never done   • PAP SMEAR  Never done   • INFLUENZA VACCINE  08/01/2022   • LIPID PANEL  04/27/2023   • MAMMOGRAM  12/15/2023   • DXA SCAN  12/15/2023   • ZOSTER VACCINE  Completed              Assessment & Plan   CMS Preventative Services Quick Reference  Risk Factors Identified During Encounter  Immunizations Discussed/Encouraged (specific Immunizations; UTD  The above risks/problems have been discussed with the patient.  Follow up actions/plans if indicated are seen below in the Assessment/Plan Section.  Pertinent information has been shared with the patient in the After Visit Summary.    Diagnoses and all orders for this visit:    1. Medicare annual wellness visit, subsequent (Primary)        Follow Up:   No follow-ups on file.     An After Visit Summary and PPPS were made available to the patient.

## 2022-05-18 ENCOUNTER — TELEPHONE (OUTPATIENT)
Dept: INTERNAL MEDICINE | Facility: CLINIC | Age: 72
End: 2022-05-18

## 2022-05-18 DIAGNOSIS — E78.5 HYPERLIPIDEMIA, UNSPECIFIED HYPERLIPIDEMIA TYPE: Primary | ICD-10-CM

## 2022-05-18 RX ORDER — PRAVASTATIN SODIUM 10 MG
10 TABLET ORAL DAILY
Qty: 90 TABLET | Refills: 1 | Status: SHIPPED | OUTPATIENT
Start: 2022-05-18 | End: 2022-11-16 | Stop reason: SDUPTHER

## 2022-05-23 ENCOUNTER — PREP FOR SURGERY (OUTPATIENT)
Dept: OTHER | Facility: HOSPITAL | Age: 72
End: 2022-05-23

## 2022-05-23 ENCOUNTER — OFFICE VISIT (OUTPATIENT)
Dept: GASTROENTEROLOGY | Facility: CLINIC | Age: 72
End: 2022-05-23

## 2022-05-23 VITALS
BODY MASS INDEX: 33.97 KG/M2 | SYSTOLIC BLOOD PRESSURE: 138 MMHG | WEIGHT: 184.6 LBS | HEIGHT: 62 IN | DIASTOLIC BLOOD PRESSURE: 82 MMHG

## 2022-05-23 DIAGNOSIS — K21.00 GASTROESOPHAGEAL REFLUX DISEASE WITH ESOPHAGITIS, UNSPECIFIED WHETHER HEMORRHAGE: Primary | ICD-10-CM

## 2022-05-23 DIAGNOSIS — K51.00 ULCERATIVE PANCOLITIS: ICD-10-CM

## 2022-05-23 DIAGNOSIS — R14.0 BLOATING: ICD-10-CM

## 2022-05-23 PROCEDURE — 99213 OFFICE O/P EST LOW 20 MIN: CPT | Performed by: INTERNAL MEDICINE

## 2022-05-23 NOTE — PROGRESS NOTES
PATIENT INFORMATION  Yelena Rangel       - 1950    CHIEF COMPLAINT  Chief Complaint   Patient presents with   • Ulcerative Pancolitis   • Heartburn       HISTORY OF PRESENT ILLNESS  Is a month away form her last infusions. And is doing well but is on a new Statin jus tthree days ago andwsa put on Pravastatin.    Daily BMs and complains of gas not foul just ucomfortable      REVIEWED PERTINENT RESULTS/ LABS  Lab Results   Component Value Date    CASEREPORT  2017     Surgical Pathology Report                         Case: [id number]                                  Authorizing Provider:  Dl Saldaña, Collected:           2017 01:32 PM                                 MD                                                                           Ordering Location:     [facility]   Received:            2017 02:47 PM                                 OR                                                                           Pathologist:           Naveen Peraza MD                                                          Specimen:    Large Intestine, Rectum                                                                    FINALDX  2017     Testing performed at outside laboratory. See scanned report.         Lab Results   Component Value Date    HGB 13.3 2022    MCV 95 2022     2022    ALT 13 2022    AST 17 2022    HGBA1C 5.6 2022    INR 1.08 03/10/2020    TRIG 223 (H) 2022      No results found.    REVIEW OF SYSTEMS  Review of Systems   Constitutional: Negative for activity change, chills, fever and unexpected weight change.   HENT: Positive for trouble swallowing. Negative for congestion.    Eyes: Negative for visual disturbance.   Respiratory: Positive for cough and choking. Negative for shortness of breath.    Cardiovascular: Negative for chest pain and palpitations.   Gastrointestinal: Positive for  abdominal distention. Negative for abdominal pain and blood in stool.   Endocrine: Negative for cold intolerance and heat intolerance.   Genitourinary: Negative for hematuria.   Musculoskeletal: Negative for gait problem.   Skin: Negative for color change.   Allergic/Immunologic: Negative for immunocompromised state.   Neurological: Negative for weakness and light-headedness.   Hematological: Negative for adenopathy.   Psychiatric/Behavioral: Negative for sleep disturbance. The patient is not nervous/anxious.          ACTIVE PROBLEMS  Patient Active Problem List    Diagnosis    • Ulcerative pancolitis (HCC) [K51.00]    • Acute upper respiratory infection [J06.9]    • Gastroesophageal reflux disease with esophagitis [K21.00]    • GERD (gastroesophageal reflux disease) [K21.9]    • Hematuria [R31.9]    • Osteoarthritis of knee [M17.10]    • Plantar fasciitis [M72.2]    • Routine health maintenance [Z00.00]    • Rheumatoid arthritis involving multiple sites with positive rheumatoid factor (HCC) [M05.79]    • TIA (transient ischemic attack) [G45.9]    • Preop cardiovascular exam [Z01.810]    • Leukopenia [D72.819]    • Depression [F32.A]    • Hyperlipidemia [E78.5]    • Hypothyroidism [E03.9]    • Age-related osteoporosis without current pathological fracture [M81.0]    • WPW (Liseth-Parkinson-White syndrome) [I45.6]    • Non-specific colitis [K52.9]    • Allergic rhinitis [J30.9]    • Primary osteoarthritis of knees, bilateral [M17.0]          PAST MEDICAL HISTORY  Past Medical History:   Diagnosis Date   • Anxiety    • Arthritis    • Arthritis    • Colon polyp    • Disease of thyroid gland    • Environmental allergies    • GERD (gastroesophageal reflux disease)    • Hyperlipidemia    • Hypothyroidism    • Osteopenia    • Rheumatoid arthritis (HCC)    • Shingles    • TIA (transient ischemic attack)    • 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 Simon Barber, surgeon   • SIGMOIDOSCOPY N/A 12/13/2017    Procedure: SIGMOIDOSCOPY FLEXIBLE with biopsy;  Surgeon: Dl Saldaña MD;  Location: [facility];  Service:    • THYROID LOBECTOMY Right    • TOTAL KNEE ARTHROPLASTY Right 11/20/2018         FAMILY HISTORY  Family History   Problem Relation Age of Onset   • Breast cancer Mother    • Breast cancer Sister    • Colon cancer Father    • Colon cancer Brother          SOCIAL HISTORY  Social History     Occupational History   • Not on file   Tobacco Use   • Smoking status: Never Smoker   • Smokeless tobacco: Never Used   Vaping Use   • Vaping Use: Never used   Substance and Sexual Activity   • Alcohol use: Yes     Alcohol/week: 10.0 standard drinks     Types: 10 Cans of beer per week     Comment: twice week//caffeine yes   • Drug use: No   • Sexual activity: Defer         CURRENT MEDICATIONS    Current Outpatient Medications:   •  Acetaminophen (TYLENOL ARTHRITIS PAIN PO), Take 1 tablet by mouth 3 (Three) Times a Day. AS NEEDED FOR ARTHRITIS, Disp: , Rfl:   •  aspirin 81 MG chewable tablet, Chew 81 mg Daily., Disp: , Rfl:   •  Calcium Carb-Cholecalciferol (CALCIUM PLUS VITAMIN D3 PO), Take 1,400 mg by mouth Daily. D3 = 1500 IU, Disp: , Rfl:   •  cetirizine (ZyrTEC) 10 MG tablet, Take 10 mg by mouth daily., Disp: , Rfl:   •  denosumab (PROLIA) 60 MG/ML solution prefilled syringe syringe, Inject  under the skin into the appropriate area as directed 1 (One) Time., Disp: , Rfl:   •  InFLIXimab (REMICADE IV), Infuse 1 dose into a venous catheter Every 30 (Thirty) Days. Every 60 days.   "Dr. Saldaña., Disp: , Rfl:   •  levothyroxine (SYNTHROID, LEVOTHROID) 100 MCG tablet, Take 1 tablet by mouth Daily., Disp: 90 tablet, Rfl: 1  •  Multiple Vitamins-Minerals (MULTIVITAMIN WITH MINERALS) tablet tablet, Take 1 tablet by mouth Daily., Disp: , Rfl:   •  omeprazole (priLOSEC) 20 MG capsule, Take 1 capsule by mouth Daily., Disp: 90 capsule, Rfl: 1  •  pravastatin (PRAVACHOL) 10 MG tablet, Take 1 tablet by mouth Daily., Disp: 90 tablet, Rfl: 1  •  venlafaxine XR (EFFEXOR-XR) 75 MG 24 hr capsule, Take 1 capsule by mouth Daily., Disp: 90 capsule, Rfl: 1    ALLERGIES  Sulfa antibiotics    VITALS  Vitals:    05/23/22 1452   BP: 138/82   BP Location: Left arm   Patient Position: Sitting   Cuff Size: Large Adult   Weight: 83.7 kg (184 lb 9.6 oz)   Height: 157.5 cm (62\")       PHYSICAL EXAM  Debilities/Disabilities Identified: None  Emotional Behavior: Appropriate  Wt Readings from Last 3 Encounters:   05/23/22 83.7 kg (184 lb 9.6 oz)   05/11/22 83 kg (183 lb)   04/06/22 80.7 kg (178 lb)     Ht Readings from Last 1 Encounters:   05/23/22 157.5 cm (62\")     Body mass index is 33.76 kg/m².  Physical Exam  Constitutional:       Appearance: She is well-developed. She is not diaphoretic.   HENT:      Head: Normocephalic and atraumatic.   Eyes:      General: No scleral icterus.     Conjunctiva/sclera: Conjunctivae normal.      Pupils: Pupils are equal, round, and reactive to light.   Neck:      Thyroid: No thyromegaly.   Cardiovascular:      Rate and Rhythm: Normal rate and regular rhythm.      Heart sounds: Normal heart sounds. No murmur heard.    No gallop.   Pulmonary:      Effort: Pulmonary effort is normal.      Breath sounds: Normal breath sounds. No wheezing or rales.   Abdominal:      General: Bowel sounds are normal. There is no distension or abdominal bruit.      Palpations: Abdomen is soft. There is no shifting dullness, fluid wave or mass.      Tenderness: There is no abdominal tenderness. There is no " guarding. Negative signs include Sorto's sign.      Hernia: There is no hernia in the ventral area.   Musculoskeletal:         General: Normal range of motion.      Cervical back: Normal range of motion and neck supple.   Lymphadenopathy:      Cervical: No cervical adenopathy.   Skin:     General: Skin is warm and dry.      Findings: No erythema or rash.   Neurological:      Mental Status: She is alert and oriented to person, place, and time.   Psychiatric:         Mood and Affect: Mood normal.         Behavior: Behavior normal.         CLINICAL DATA REVIEWED   reviewed previous lab results and integrated with today's visit, reviewed notes from other physicians and/or last GI encounter, reviewed previous endoscopy results and available photos, reviewed surgical pathology results from previous biopsies    ASSESSMENT  Diagnoses and all orders for this visit:    Gastroesophageal reflux disease with esophagitis, unspecified whether hemorrhage    Ulcerative pancolitis (HCC)  -     Case Request; Standing  -     COVID PRE-OP / PRE-PROCEDURE SCREENING ORDER (NO ISOLATION) - Swab, Nasopharynx; Future  -     Case Request    Bloating    Other orders  -     Follow Anesthesia Guidelines / Protocol; Future          PLAN  Will sched screening colon     No follow-ups on file.    I have discussed the above plan with the patient.  They verbalize understanding and are in agreement with the plan.  They have been advised to contact the office for any questions, concerns, or changes related to their health.

## 2022-06-01 ENCOUNTER — HOSPITAL ENCOUNTER (OUTPATIENT)
Dept: INFUSION THERAPY | Facility: HOSPITAL | Age: 72
Discharge: HOME | End: 2022-06-01
Admitting: INTERNAL MEDICINE

## 2022-06-01 VITALS
OXYGEN SATURATION: 94 % | BODY MASS INDEX: 32.74 KG/M2 | HEART RATE: 65 BPM | DIASTOLIC BLOOD PRESSURE: 82 MMHG | WEIGHT: 179 LBS | TEMPERATURE: 97.4 F | RESPIRATION RATE: 16 BRPM | SYSTOLIC BLOOD PRESSURE: 135 MMHG

## 2022-06-01 DIAGNOSIS — E03.9 HYPOTHYROIDISM, UNSPECIFIED TYPE: ICD-10-CM

## 2022-06-01 DIAGNOSIS — K51.00 ULCERATIVE PANCOLITIS: Primary | ICD-10-CM

## 2022-06-01 DIAGNOSIS — K21.00 GASTROESOPHAGEAL REFLUX DISEASE WITH ESOPHAGITIS WITHOUT HEMORRHAGE: ICD-10-CM

## 2022-06-01 PROCEDURE — 25010000002 INFLIXIMAB PER 10 MG: Performed by: INTERNAL MEDICINE

## 2022-06-01 PROCEDURE — 96415 CHEMO IV INFUSION ADDL HR: CPT

## 2022-06-01 PROCEDURE — 96413 CHEMO IV INFUSION 1 HR: CPT

## 2022-06-01 RX ORDER — LEVOTHYROXINE SODIUM 0.1 MG/1
100 TABLET ORAL DAILY
Qty: 90 TABLET | Refills: 1 | Status: SHIPPED | OUTPATIENT
Start: 2022-06-01 | End: 2022-11-16 | Stop reason: SDUPTHER

## 2022-06-01 RX ORDER — VENLAFAXINE HYDROCHLORIDE 75 MG/1
75 CAPSULE, EXTENDED RELEASE ORAL DAILY
Qty: 90 CAPSULE | Refills: 1 | Status: SHIPPED | OUTPATIENT
Start: 2022-06-01 | End: 2022-11-28 | Stop reason: SDUPTHER

## 2022-06-01 RX ORDER — OMEPRAZOLE 20 MG/1
20 CAPSULE, DELAYED RELEASE ORAL DAILY
Qty: 90 CAPSULE | Refills: 1 | Status: SHIPPED | OUTPATIENT
Start: 2022-06-01 | End: 2022-11-28 | Stop reason: SDUPTHER

## 2022-06-01 RX ADMIN — INFLIXIMAB 800 MG: 100 INJECTION, POWDER, LYOPHILIZED, FOR SOLUTION INTRAVENOUS at 09:12

## 2022-06-01 NOTE — NURSING NOTE
NURSING PROGRESS NOTE:    PATIENT ARRIVE TO [facility] FOR SCHEDULED INFUSION AT 0815 .  PROCEDURE WAS PERFORMED WITHOUT INCIDENT.   PATIENT DISCHARGED HOME AT 1125 . KAILYN LEE

## 2022-07-18 ENCOUNTER — TELEPHONE (OUTPATIENT)
Dept: GASTROENTEROLOGY | Facility: CLINIC | Age: 72
End: 2022-07-18

## 2022-07-18 NOTE — TELEPHONE ENCOUNTER
Patient called to reschedule her colonoscopy on 10/17/2022.    [facility].  Will mail new instructions.

## 2022-07-27 ENCOUNTER — HOSPITAL ENCOUNTER (OUTPATIENT)
Dept: INFUSION THERAPY | Facility: HOSPITAL | Age: 72
Discharge: HOME | End: 2022-07-27
Admitting: INTERNAL MEDICINE

## 2022-07-27 VITALS
HEART RATE: 64 BPM | RESPIRATION RATE: 16 BRPM | TEMPERATURE: 97.8 F | WEIGHT: 179 LBS | DIASTOLIC BLOOD PRESSURE: 83 MMHG | BODY MASS INDEX: 32.74 KG/M2 | SYSTOLIC BLOOD PRESSURE: 149 MMHG | OXYGEN SATURATION: 97 %

## 2022-07-27 DIAGNOSIS — K51.00 ULCERATIVE PANCOLITIS: Primary | ICD-10-CM

## 2022-07-27 PROCEDURE — 96415 CHEMO IV INFUSION ADDL HR: CPT

## 2022-07-27 PROCEDURE — 25010000002 INFLIXIMAB PER 10 MG: Performed by: INTERNAL MEDICINE

## 2022-07-27 PROCEDURE — 96413 CHEMO IV INFUSION 1 HR: CPT

## 2022-07-27 RX ADMIN — INFLIXIMAB 800 MG: 100 INJECTION, POWDER, LYOPHILIZED, FOR SOLUTION INTRAVENOUS at 08:36

## 2022-07-27 NOTE — NURSING NOTE
Patient arrived to [facility] at 0830 for scheduled appointment.  History and medications reviewed.  Medication administered as ordered without complications.  AVS refused by patient.  Patient discharged from [facility] at 1053 in stable condition without complaints.  Ambulatory towards front lobby.

## 2022-09-20 ENCOUNTER — TELEPHONE (OUTPATIENT)
Dept: INTERNAL MEDICINE | Facility: CLINIC | Age: 72
End: 2022-09-20

## 2022-09-20 NOTE — TELEPHONE ENCOUNTER
.    Caller: Juan Carlos Yelena TYE    Relationship: Self    Best call back number: [phone]       What form or medical record are you requesting:     LETTER OF MEDICAL STATEMENT TO EXCUSE PATIENT FROM JURY DUTY DUE TO HEALTH CONDITIONS.  SHE DOES NOT THINK SHE CAN DO THIS BASED ON THE MEDICAL CONDITIONS THAT SHE DOES NOT HAVE CONTROL OF OR WHEN SHE WILL HAVE AN EPISODE.    Who is requesting this form or medical record from you: PATIENT    How would you like to receive the form or medical records (pick-up, mail, fax):     PATIENT WILL  TOMORROW IF IT IS READY    Timeframe paperwork needed: TOMORROW      Additional notes:     PATIENT WILL COME BY SOMETIME AFTER 12:00 TO PICK THE STATEMENT UP AFTER HER INFUSION.    PLEASE CALL PATIENT AND ADVISE IF THIS IS POSSIBLE AND WHEN IT WILL BE READY.

## 2022-09-21 ENCOUNTER — HOSPITAL ENCOUNTER (OUTPATIENT)
Dept: INFUSION THERAPY | Facility: HOSPITAL | Age: 72
Discharge: HOME | End: 2022-09-21
Admitting: INTERNAL MEDICINE

## 2022-09-21 VITALS
TEMPERATURE: 97 F | BODY MASS INDEX: 32.56 KG/M2 | DIASTOLIC BLOOD PRESSURE: 88 MMHG | OXYGEN SATURATION: 95 % | SYSTOLIC BLOOD PRESSURE: 133 MMHG | WEIGHT: 178 LBS | RESPIRATION RATE: 16 BRPM | HEART RATE: 71 BPM

## 2022-09-21 DIAGNOSIS — K51.20 ULCERATIVE PROCTITIS WITHOUT COMPLICATION: Primary | ICD-10-CM

## 2022-09-21 DIAGNOSIS — K51.00 ULCERATIVE PANCOLITIS: ICD-10-CM

## 2022-09-21 PROCEDURE — 25010000002 INFLIXIMAB PER 10 MG: Performed by: INTERNAL MEDICINE

## 2022-09-21 PROCEDURE — 96415 CHEMO IV INFUSION ADDL HR: CPT

## 2022-09-21 PROCEDURE — 96413 CHEMO IV INFUSION 1 HR: CPT

## 2022-09-21 RX ADMIN — INFLIXIMAB 800 MG: 100 INJECTION, POWDER, LYOPHILIZED, FOR SOLUTION INTRAVENOUS at 08:49

## 2022-09-21 NOTE — TELEPHONE ENCOUNTER
Called and spoke with patient and informed her that we had the form ready for her to . She asked if I could fax it, she gave the fax number and I faxed this for the patient

## 2022-09-21 NOTE — PATIENT INSTRUCTIONS
"Call Dr. Dl Saldaña, Gastroenterology at [phone] if you have any problems or concerns.    We know you have a Choice in healthcare and appreciate you using [facility].  Our purpose is to provide you \"Excellent Care\".  We hope that you will always choose us in the future and continue to recommend us to your family and friends.     "

## 2022-09-21 NOTE — NURSING NOTE
NURSING PROGRESS NOTE:    PATIENT ARRIVE TO [facility] FOR SCHEDULED INFUSION AT 0810 .  PROCEDURE WAS PERFORMED WITHOUT INCIDENT.   PATIENT DISCHARGED HOME AT 1100 . KAILYN LEE

## 2022-09-21 NOTE — TELEPHONE ENCOUNTER
Okay to draft brief letter stating patient has multiple medical problems and is not a good candidate for jury duty.

## 2022-09-27 ENCOUNTER — TELEPHONE (OUTPATIENT)
Dept: INTERNAL MEDICINE | Facility: CLINIC | Age: 72
End: 2022-09-27

## 2022-09-27 NOTE — TELEPHONE ENCOUNTER
Caller: Juan Carlos Yelena TYE    Relationship: Self    Best call back number: [phone]      Do you require a callback: YES- THEY NEED MORE INFORMATION TO AMEND THE NOTE TO RELEASE FROM JURY DUTY.    REGARDING: ULCERATIVE COLITIS/THEY NEED MORE INFORMATION. SUCH AS WHAT IT IS AND WHY IT WOULD KEEP PATIENT FROM JURY DUTY.    THEY NEED DESCRIPTION OF HEADACHES/MIGRAINES.    THEY NEED MORE INFORMATION ABOUT ARTHRITIS.    THEY NEED MORE INFORMATION ABOUT SPEECH DIFFICULTIES.      THEY ARE NEEDING ASAP.

## 2022-10-03 ENCOUNTER — TELEPHONE (OUTPATIENT)
Dept: INTERNAL MEDICINE | Facility: CLINIC | Age: 72
End: 2022-10-03

## 2022-10-03 NOTE — TELEPHONE ENCOUNTER
Caller: Yelena Rangel    Relationship to patient: Self    Best call back number: [phone]    Patient is needing: PATIENT ADVISED THAT SHE TOOK THE LETTER DR. ROBERTS WROTE ON 9/21/22 AND THE [facility]  TOLD HER THAT THEY NEEDED MORE INFORMATION ON THE LETTER.  THEY NEED TO KNOW HER DIAGNOSIS TOO.  SHE WOULD LIKE TO KNOW IF THIS HAS BEEN REDONE OR NOT.  PLEASE CALL.

## 2022-10-05 NOTE — TELEPHONE ENCOUNTER
Please advise, pt states that they (jury duty) are needing a more descriptive not of why pt needs to be excused. They need to know what the diagnosis is and it needs to be more descriptive stated the county  office.

## 2022-11-16 ENCOUNTER — HOSPITAL ENCOUNTER (OUTPATIENT)
Dept: INFUSION THERAPY | Facility: HOSPITAL | Age: 72
Discharge: HOME | End: 2022-11-16
Admitting: INTERNAL MEDICINE

## 2022-11-16 ENCOUNTER — OFFICE VISIT (OUTPATIENT)
Dept: INTERNAL MEDICINE | Facility: CLINIC | Age: 72
End: 2022-11-16

## 2022-11-16 VITALS
OXYGEN SATURATION: 95 % | BODY MASS INDEX: 33.38 KG/M2 | HEIGHT: 62 IN | SYSTOLIC BLOOD PRESSURE: 140 MMHG | WEIGHT: 181.4 LBS | HEART RATE: 75 BPM | TEMPERATURE: 97.8 F | DIASTOLIC BLOOD PRESSURE: 90 MMHG

## 2022-11-16 VITALS
BODY MASS INDEX: 32.37 KG/M2 | SYSTOLIC BLOOD PRESSURE: 143 MMHG | DIASTOLIC BLOOD PRESSURE: 85 MMHG | RESPIRATION RATE: 14 BRPM | TEMPERATURE: 97.8 F | OXYGEN SATURATION: 98 % | HEART RATE: 65 BPM | WEIGHT: 177 LBS

## 2022-11-16 DIAGNOSIS — E78.5 HYPERLIPIDEMIA, UNSPECIFIED HYPERLIPIDEMIA TYPE: ICD-10-CM

## 2022-11-16 DIAGNOSIS — G45.9 TIA (TRANSIENT ISCHEMIC ATTACK): ICD-10-CM

## 2022-11-16 DIAGNOSIS — K51.00 ULCERATIVE PANCOLITIS: ICD-10-CM

## 2022-11-16 DIAGNOSIS — E03.9 HYPOTHYROIDISM, UNSPECIFIED TYPE: ICD-10-CM

## 2022-11-16 DIAGNOSIS — R39.15 URINARY URGENCY: ICD-10-CM

## 2022-11-16 DIAGNOSIS — Z00.00 ROUTINE HEALTH MAINTENANCE: ICD-10-CM

## 2022-11-16 DIAGNOSIS — M05.79 RHEUMATOID ARTHRITIS INVOLVING MULTIPLE SITES WITH POSITIVE RHEUMATOID FACTOR: ICD-10-CM

## 2022-11-16 DIAGNOSIS — Z23 ENCOUNTER FOR ADMINISTRATION OF VACCINE: ICD-10-CM

## 2022-11-16 DIAGNOSIS — Z12.31 ENCOUNTER FOR SCREENING MAMMOGRAM FOR MALIGNANT NEOPLASM OF BREAST: ICD-10-CM

## 2022-11-16 DIAGNOSIS — F32.A DEPRESSION, UNSPECIFIED DEPRESSION TYPE: ICD-10-CM

## 2022-11-16 DIAGNOSIS — E78.5 HYPERLIPIDEMIA, UNSPECIFIED HYPERLIPIDEMIA TYPE: Primary | ICD-10-CM

## 2022-11-16 DIAGNOSIS — K51.20 ULCERATIVE PROCTITIS WITHOUT COMPLICATION: Primary | ICD-10-CM

## 2022-11-16 DIAGNOSIS — I45.6 WPW (WOLFF-PARKINSON-WHITE SYNDROME): ICD-10-CM

## 2022-11-16 DIAGNOSIS — M81.0 AGE-RELATED OSTEOPOROSIS WITHOUT CURRENT PATHOLOGICAL FRACTURE: ICD-10-CM

## 2022-11-16 LAB
BILIRUB BLD-MCNC: NEGATIVE MG/DL
CLARITY, POC: CLEAR
COLOR UR: ABNORMAL
EXPIRATION DATE: ABNORMAL
GLUCOSE UR STRIP-MCNC: NEGATIVE MG/DL
KETONES UR QL: NEGATIVE
LEUKOCYTE EST, POC: NEGATIVE
Lab: ABNORMAL
NITRITE UR-MCNC: NEGATIVE MG/ML
PH UR: 7 [PH] (ref 5–8)
PROT UR STRIP-MCNC: NEGATIVE MG/DL
RBC # UR STRIP: ABNORMAL /UL
SP GR UR: 1.01 (ref 1–1.03)
UROBILINOGEN UR QL: ABNORMAL

## 2022-11-16 PROCEDURE — 99214 OFFICE O/P EST MOD 30 MIN: CPT | Performed by: FAMILY MEDICINE

## 2022-11-16 PROCEDURE — 90662 IIV NO PRSV INCREASED AG IM: CPT | Performed by: FAMILY MEDICINE

## 2022-11-16 PROCEDURE — 90677 PCV20 VACCINE IM: CPT | Performed by: FAMILY MEDICINE

## 2022-11-16 PROCEDURE — G0009 ADMIN PNEUMOCOCCAL VACCINE: HCPCS | Performed by: FAMILY MEDICINE

## 2022-11-16 PROCEDURE — 81003 URINALYSIS AUTO W/O SCOPE: CPT | Performed by: FAMILY MEDICINE

## 2022-11-16 PROCEDURE — 25010000002 INFLIXIMAB PER 10 MG: Performed by: INTERNAL MEDICINE

## 2022-11-16 PROCEDURE — 96415 CHEMO IV INFUSION ADDL HR: CPT

## 2022-11-16 PROCEDURE — 96413 CHEMO IV INFUSION 1 HR: CPT

## 2022-11-16 PROCEDURE — G0008 ADMIN INFLUENZA VIRUS VAC: HCPCS | Performed by: FAMILY MEDICINE

## 2022-11-16 RX ORDER — PRAVASTATIN SODIUM 10 MG
10 TABLET ORAL DAILY
Qty: 90 TABLET | Refills: 1 | Status: SHIPPED | OUTPATIENT
Start: 2022-11-16

## 2022-11-16 RX ORDER — LEVOTHYROXINE SODIUM 0.1 MG/1
100 TABLET ORAL DAILY
Qty: 90 TABLET | Refills: 1 | Status: SHIPPED | OUTPATIENT
Start: 2022-11-16

## 2022-11-16 RX ADMIN — INFLIXIMAB 800 MG: 100 INJECTION, POWDER, LYOPHILIZED, FOR SOLUTION INTRAVENOUS at 10:37

## 2022-11-16 NOTE — PROGRESS NOTES
Subjective     Yelena Rangel is a 72 y.o. female, who presents with a chief complaint of   Chief Complaint   Patient presents with   • Follow-up   • Hyperlipidemia       Hypothyroidism  Pertinent negatives include no arthralgias.   Hyperlipidemia  Exacerbating diseases include hypothyroidism.   Depression    1. Hyperlipidemia.  Pt is tolerating pravastatin, which we started last time.    2. Ulcerative colitis.  On Remicade every 2 months per Dr. Saldaña.  She has occasional flares.  Colonoscopy pending next week.    3. Depression.  Pt reports she is still doing well with venlafaxine.    4. Rheumatoid arthritis. This is also well-controlled with the Remicade.    The following portions of the patient's history were reviewed and updated as appropriate: allergies, current medications, past family history, past medical history, past social history, past surgical history and problem list.    Allergies: Sulfa antibiotics    Review of Systems   Constitutional: Negative.    HENT: Negative.    Eyes: Negative.    Respiratory: Negative.    Cardiovascular: Negative.    Gastrointestinal: Negative.    Endocrine: Negative.    Genitourinary: Negative.    Musculoskeletal: Negative.  Negative for arthralgias.   Skin: Negative.    Allergic/Immunologic: Positive for environmental allergies.   Neurological: Negative.    Hematological: Negative.    Psychiatric/Behavioral: Negative.        Objective     Wt Readings from Last 3 Encounters:   11/16/22 82.3 kg (181 lb 6.4 oz)   09/21/22 80.7 kg (178 lb)   07/27/22 81.2 kg (179 lb)     Temp Readings from Last 3 Encounters:   11/16/22 97.8 °F (36.6 °C)   09/21/22 97 °F (36.1 °C)   07/27/22 97.8 °F (36.6 °C)     BP Readings from Last 3 Encounters:   11/16/22 140/90   09/21/22 133/88   07/27/22 149/83     Pulse Readings from Last 3 Encounters:   11/16/22 75   09/21/22 71   07/27/22 64     Body mass index is 33.17 kg/m².  SpO2 Readings from Last 3 Encounters:   09/27/17 98%   03/29/17 98%    09/28/16 97%       Physical Exam   Constitutional: She is oriented to person, place, and time. She appears well-developed. No distress.   HENT:   Head: Normocephalic and atraumatic.   Mouth/Throat: Mucous membranes are moist.   Eyes: Conjunctivae are normal.   Neck: No thyromegaly present.   Cardiovascular: Normal rate, regular rhythm and normal heart sounds.   Pulmonary/Chest: Effort normal and breath sounds normal. No respiratory distress.   Abdominal: Normal appearance.   Musculoskeletal:      Right lower leg: No edema.      Left lower leg: No edema.   Neurological: She is alert and oriented to person, place, and time.   Skin: Skin is warm and dry.   Psychiatric: Her behavior is normal. Mood normal.   Nursing note and vitals reviewed.      Results for orders placed or performed in visit on 05/16/22   Comprehensive Metabolic Panel    Specimen: Blood   Result Value Ref Range    Glucose 90 65 - 99 mg/dL    BUN 9 8 - 23 mg/dL    Creatinine 0.67 0.57 - 1.00 mg/dL    EGFR Result 93.6 >60.0 mL/min/1.73    BUN/Creatinine Ratio 13.4 7.0 - 25.0    Sodium 134 (L) 136 - 145 mmol/L    Potassium 4.2 3.5 - 5.2 mmol/L    Chloride 93 (L) 98 - 107 mmol/L    Total CO2 28.8 22.0 - 29.0 mmol/L    Calcium 9.6 8.6 - 10.5 mg/dL    Total Protein 7.9 6.0 - 8.5 g/dL    Albumin 4.30 3.50 - 5.20 g/dL    Globulin 3.6 gm/dL    A/G Ratio 1.2 g/dL    Total Bilirubin 0.5 0.0 - 1.2 mg/dL    Alkaline Phosphatase 58 39 - 117 U/L    AST (SGOT) 19 1 - 32 U/L    ALT (SGPT) 10 1 - 33 U/L   TSH    Specimen: Blood   Result Value Ref Range    TSH 0.274 0.270 - 4.200 uIU/mL   T4, Free    Specimen: Blood   Result Value Ref Range    Free T4 1.70 0.93 - 1.70 ng/dL   Lipid Panel With / Chol / HDL Ratio    Specimen: Blood   Result Value Ref Range    Total Cholesterol 215 (H) 0 - 200 mg/dL    Triglycerides 178 (H) 0 - 150 mg/dL    HDL Cholesterol 57 40 - 60 mg/dL    VLDL Cholesterol Jose 31 5 - 40 mg/dL    LDL Chol Calc ([facility]) 127 (H) 0 - 100 mg/dL    Chol/HDL  Ratio 3.77    Hemoglobin A1c    Specimen: Blood   Result Value Ref Range    Hemoglobin A1C 5.70 (H) 4.80 - 5.60 %   Vitamin B12    Specimen: Blood   Result Value Ref Range    Vitamin B-12 822 211 - 946 pg/mL   Vitamin D 25 Hydroxy    Specimen: Blood   Result Value Ref Range    25 Hydroxy, Vitamin D 59.7 30.0 - 100.0 ng/ml   Phosphorus    Specimen: Blood   Result Value Ref Range    Phosphorus 4.3 2.5 - 4.5 mg/dL   Magnesium    Specimen: Blood   Result Value Ref Range    Magnesium 1.8 1.6 - 2.4 mg/dL   CBC & Differential    Specimen: Blood   Result Value Ref Range    WBC 6.09 3.40 - 10.80 10*3/mm3    RBC 4.10 3.77 - 5.28 10*6/mm3    Hemoglobin 13.0 12.0 - 15.9 g/dL    Hematocrit 38.2 34.0 - 46.6 %    MCV 93.2 79.0 - 97.0 fL    MCH 31.7 26.6 - 33.0 pg    MCHC 34.0 31.5 - 35.7 g/dL    RDW 11.7 (L) 12.3 - 15.4 %    Platelets 337 140 - 450 10*3/mm3    Neutrophil Rel % 38.5 (L) 42.7 - 76.0 %    Lymphocyte Rel % 41.7 19.6 - 45.3 %    Monocyte Rel % 12.3 (H) 5.0 - 12.0 %    Eosinophil Rel % 6.2 0.3 - 6.2 %    Basophil Rel % 1.0 0.0 - 1.5 %    Neutrophils Absolute 2.34 1.70 - 7.00 10*3/mm3    Lymphocytes Absolute 2.54 0.70 - 3.10 10*3/mm3    Monocytes Absolute 0.75 0.10 - 0.90 10*3/mm3    Eosinophils Absolute 0.38 0.00 - 0.40 10*3/mm3    Basophils Absolute 0.06 0.00 - 0.20 10*3/mm3    Immature Granulocyte Rel % 0.3 0.0 - 0.5 %    Immature Grans Absolute 0.02 0.00 - 0.05 10*3/mm3    nRBC 0.0 0.0 - 0.2 /100 WBC       Assessment/Plan   Diagnoses and all orders for this visit:    1. Hyperlipidemia, unspecified hyperlipidemia type (Primary)  -     Comprehensive Metabolic Panel; Future  -     Lipid Panel With / Chol / HDL Ratio; Future    2. Hypothyroidism, unspecified type  -     CBC & Differential; Future  -     TSH; Future  -     T4, Free; Future    3. Depression, unspecified depression type    4. Age-related osteoporosis without current pathological fracture    5. Ulcerative pancolitis (HCC)    6. WPW (Liseth-Parkinson-White  syndrome)    7. Rheumatoid arthritis involving multiple sites with positive rheumatoid factor (HCC)    8. TIA (transient ischemic attack)    9. Routine health maintenance  -     Hemoglobin A1c; Future  -     Vitamin B12; Future    10. Encounter for screening mammogram for malignant neoplasm of breast  -     Mammo screening digital tomosynthesis bilateral w CAD; Future    11. Encounter for administration of vaccine  -     Fluzone High-Dose 65+yrs ([id number])  -     Pneumococcal Conjugate Vaccine 20-Valent (PCV20)    12. Urinary urgency  -     POCT urinalysis dipstick, automated  -     Urine Culture - Urine, Urine, Clean Catch; Future    1. Hyperlipidemia.  Improved with pravastatin.  Continue lifestyle measures.  Labs reviewed.    2. Hypothyroidism.  Adequately replaced.  Labs reviewed.    3. Depression.  Controlled.  Continue venlafaxine and lifestyle measures.     4. Osteoporosis.  Continue Prolia.  Dexa scan done 12/2021 showed improvement in bone density.    5. Ulcerative colitis.  Continue per Dr. Saldaña.     6. Liseth-Parkinson-White.  She saw cardiology before her knee replacements.    7. RA.  Controlled with Remicade.    8. TIA.  Continue ASA 81 mg and pravstatin.    9. Routine health maint.  Prevnar 20 today.  Flu vaccine today.  Mammogram due next month and is ordered.  Covid-19 vaccine done.  Had both doses of Shingrix at pharmacy.    10. Urinary urgency.  Check UA and culture.        Outpatient Medications Prior to Visit   Medication Sig Dispense Refill   • Acetaminophen (TYLENOL ARTHRITIS PAIN PO) Take 1 tablet by mouth 3 (Three) Times a Day. AS NEEDED FOR ARTHRITIS     • aspirin 81 MG chewable tablet Chew 81 mg Daily.     • Calcium Carb-Cholecalciferol (CALCIUM PLUS VITAMIN D3 PO) Take 1,400 mg by mouth Daily. D3 = 1500 IU     • cetirizine (ZyrTEC) 10 MG tablet Take 10 mg by mouth daily.     • denosumab (PROLIA) 60 MG/ML solution prefilled syringe syringe Inject  under the skin into the appropriate  area as directed 1 (One) Time.     • InFLIXimab (REMICADE IV) Infuse 1 dose into a venous catheter Every 30 (Thirty) Days. Every 60 days.  Dr. Saldaña.     • levothyroxine (SYNTHROID, LEVOTHROID) 100 MCG tablet Take 1 tablet by mouth Daily. 90 tablet 1   • Multiple Vitamins-Minerals (MULTIVITAMIN WITH MINERALS) tablet tablet Take 1 tablet by mouth Daily.     • omeprazole (priLOSEC) 20 MG capsule Take 1 capsule by mouth Daily. 90 capsule 1   • pravastatin (PRAVACHOL) 10 MG tablet Take 1 tablet by mouth Daily. 90 tablet 1   • Probiotic Product (PROBIOTIC DAILY PO) Take 2 capsules by mouth Daily. OTC Probiotic     • venlafaxine XR (EFFEXOR-XR) 75 MG 24 hr capsule Take 1 capsule by mouth Daily. 90 capsule 1     No facility-administered medications prior to visit.     No orders of the defined types were placed in this encounter.    [unfilled]  There are no discontinued medications.    Return in about 6 months (around 5/16/2023) for Medicare Wellness.

## 2022-11-16 NOTE — PATIENT INSTRUCTIONS
"  Call Dr. Dl Saldaña, Gastroenterology at [phone] if you have any problems or concerns.    We know you have a Choice in healthcare and appreciate you using [facility].  Our purpose is to provide you \"Excellent Care\".  We hope that you will always choose us in the future and continue to recommend us to your family and friends.             "

## 2022-11-16 NOTE — TELEPHONE ENCOUNTER
Pt saysharmacy reports waiting on refill rx  Rx Refill Note  Requested Prescriptions     Pending Prescriptions Disp Refills   • levothyroxine (SYNTHROID, LEVOTHROID) 100 MCG tablet 90 tablet 1     Sig: Take 1 tablet by mouth Daily.   • pravastatin (PRAVACHOL) 10 MG tablet 90 tablet 1     Sig: Take 1 tablet by mouth Daily.      Last office visit with prescribing clinician: 11/16/2022      Next office visit with prescribing clinician: Visit date not found            Razia Santacruz  11/16/22, 09:26 EST

## 2022-11-16 NOTE — TELEPHONE ENCOUNTER
PATIENT IS CALLING TO CHECK THE STATUS OF HER REQUEST FOR THE FOLLOWING MEDICATIONS.  SHE STATES SHE [facility] HAVE [facility]ENT [facility].  SHE STATES SHE WOULD LIKE TO  WHILE IN [place] THIS MORNING IF POSSIBLE TO SAVE HERSELF A TRIP.     pravastatin (PRAVACHOL) 10 MG tablet      levothyroxine (SYNTHROID, LEVOTHROID) 100 MCG tablet   [facility] [id number] [facility] [place] - [address] - [phone]  - [phone]   [phone]    PLEASE ADVISE.

## 2022-11-16 NOTE — NURSING NOTE
NURSING PROGRESS NOTE:    PATIENT ARRIVE TO [facility] FOR SCHEDULED INFUSION AT 1030 .  PROCEDURE WAS PERFORMED WITHOUT INCIDENT. AVS REVIEWED PRIOR TO DISCHARGE.  PATIENT DISCHARGED HOME AT 1245 . KAILYN LEE

## 2022-11-18 ENCOUNTER — APPOINTMENT (OUTPATIENT)
Dept: INFUSION THERAPY | Facility: HOSPITAL | Age: 72
End: 2022-11-18

## 2022-11-18 LAB
BACTERIA UR CULT: NORMAL
BACTERIA UR CULT: NORMAL

## 2022-11-21 ENCOUNTER — ANESTHESIA EVENT (OUTPATIENT)
Dept: PERIOP | Facility: HOSPITAL | Age: 72
End: 2022-11-21

## 2022-11-23 ENCOUNTER — ANESTHESIA (OUTPATIENT)
Dept: PERIOP | Facility: HOSPITAL | Age: 72
End: 2022-11-23

## 2022-11-23 ENCOUNTER — HOSPITAL ENCOUNTER (OUTPATIENT)
Facility: HOSPITAL | Age: 72
Setting detail: HOSPITAL OUTPATIENT SURGERY
Discharge: HOME | End: 2022-11-23
Attending: INTERNAL MEDICINE | Admitting: INTERNAL MEDICINE

## 2022-11-23 VITALS
OXYGEN SATURATION: 99 % | HEART RATE: 78 BPM | WEIGHT: 178.2 LBS | BODY MASS INDEX: 32.58 KG/M2 | RESPIRATION RATE: 16 BRPM | DIASTOLIC BLOOD PRESSURE: 68 MMHG | TEMPERATURE: 97.4 F | SYSTOLIC BLOOD PRESSURE: 97 MMHG

## 2022-11-23 DIAGNOSIS — K51.00 ULCERATIVE PANCOLITIS: ICD-10-CM

## 2022-11-23 PROCEDURE — 88305 TISSUE EXAM BY PATHOLOGIST: CPT | Performed by: INTERNAL MEDICINE

## 2022-11-23 PROCEDURE — 45380 COLONOSCOPY AND BIOPSY: CPT | Performed by: INTERNAL MEDICINE

## 2022-11-23 PROCEDURE — 25010000002 PROPOFOL 200 MG/20ML EMULSION: Performed by: NURSE ANESTHETIST, CERTIFIED REGISTERED

## 2022-11-23 RX ORDER — SODIUM CHLORIDE, SODIUM LACTATE, POTASSIUM CHLORIDE, CALCIUM CHLORIDE 600; 310; 30; 20 MG/100ML; MG/100ML; MG/100ML; MG/100ML
100 INJECTION, SOLUTION INTRAVENOUS CONTINUOUS
Status: DISCONTINUED | OUTPATIENT
Start: 2022-11-23 | End: 2022-11-23 | Stop reason: HOSPADM

## 2022-11-23 RX ORDER — SODIUM CHLORIDE 9 MG/ML
40 INJECTION, SOLUTION INTRAVENOUS AS NEEDED
Status: DISCONTINUED | OUTPATIENT
Start: 2022-11-23 | End: 2022-11-23 | Stop reason: HOSPADM

## 2022-11-23 RX ORDER — ONDANSETRON 2 MG/ML
4 INJECTION INTRAMUSCULAR; INTRAVENOUS ONCE AS NEEDED
Status: DISCONTINUED | OUTPATIENT
Start: 2022-11-23 | End: 2022-11-23 | Stop reason: HOSPADM

## 2022-11-23 RX ORDER — SODIUM CHLORIDE, SODIUM LACTATE, POTASSIUM CHLORIDE, CALCIUM CHLORIDE 600; 310; 30; 20 MG/100ML; MG/100ML; MG/100ML; MG/100ML
9 INJECTION, SOLUTION INTRAVENOUS CONTINUOUS
Status: DISCONTINUED | OUTPATIENT
Start: 2022-11-23 | End: 2022-11-23 | Stop reason: HOSPADM

## 2022-11-23 RX ORDER — SODIUM CHLORIDE 0.9 % (FLUSH) 0.9 %
10 SYRINGE (ML) INJECTION EVERY 12 HOURS SCHEDULED
Status: DISCONTINUED | OUTPATIENT
Start: 2022-11-23 | End: 2022-11-23 | Stop reason: HOSPADM

## 2022-11-23 RX ORDER — LIDOCAINE HYDROCHLORIDE 10 MG/ML
0.5 INJECTION, SOLUTION EPIDURAL; INFILTRATION; INTRACAUDAL; PERINEURAL ONCE AS NEEDED
Status: DISCONTINUED | OUTPATIENT
Start: 2022-11-23 | End: 2022-11-23 | Stop reason: HOSPADM

## 2022-11-23 RX ORDER — PROPOFOL 10 MG/ML
INJECTION, EMULSION INTRAVENOUS AS NEEDED
Status: DISCONTINUED | OUTPATIENT
Start: 2022-11-23 | End: 2022-11-23 | Stop reason: SURG

## 2022-11-23 RX ORDER — LIDOCAINE HYDROCHLORIDE 10 MG/ML
INJECTION, SOLUTION EPIDURAL; INFILTRATION; INTRACAUDAL; PERINEURAL AS NEEDED
Status: DISCONTINUED | OUTPATIENT
Start: 2022-11-23 | End: 2022-11-23 | Stop reason: SURG

## 2022-11-23 RX ORDER — SODIUM CHLORIDE 0.9 % (FLUSH) 0.9 %
10 SYRINGE (ML) INJECTION AS NEEDED
Status: DISCONTINUED | OUTPATIENT
Start: 2022-11-23 | End: 2022-11-23 | Stop reason: HOSPADM

## 2022-11-23 RX ADMIN — PROPOFOL 50 MG: 10 INJECTION, EMULSION INTRAVENOUS at 14:28

## 2022-11-23 RX ADMIN — PROPOFOL 50 MG: 10 INJECTION, EMULSION INTRAVENOUS at 14:22

## 2022-11-23 RX ADMIN — SODIUM CHLORIDE, POTASSIUM CHLORIDE, SODIUM LACTATE AND CALCIUM CHLORIDE: 600; 310; 30; 20 INJECTION, SOLUTION INTRAVENOUS at 14:16

## 2022-11-23 RX ADMIN — LIDOCAINE HYDROCHLORIDE 50 MG: 10 INJECTION, SOLUTION EPIDURAL; INFILTRATION; INTRACAUDAL; PERINEURAL at 14:22

## 2022-11-23 RX ADMIN — PROPOFOL 50 MG: 10 INJECTION, EMULSION INTRAVENOUS at 14:25

## 2022-11-23 RX ADMIN — PROPOFOL 50 MG: 10 INJECTION, EMULSION INTRAVENOUS at 14:39

## 2022-11-23 RX ADMIN — PROPOFOL 50 MG: 10 INJECTION, EMULSION INTRAVENOUS at 14:35

## 2022-11-23 RX ADMIN — PROPOFOL 50 MG: 10 INJECTION, EMULSION INTRAVENOUS at 14:31

## 2022-11-23 NOTE — ANESTHESIA PREPROCEDURE EVALUATION
Anesthesia Evaluation     Patient summary reviewed and Nursing notes reviewed   history of anesthetic complications: prolonged sedation  NPO Solid Status: > 8 hours  NPO Liquid Status: > 8 hours           Airway   Mallampati: II  TM distance: >3 FB  Neck ROM: full  No difficulty expected  Dental    (+) upper dentures    Pulmonary - negative pulmonary ROS and normal exam    breath sounds clear to auscultation  (-) recent URI  Cardiovascular - normal exam  Exercise tolerance: good (4-7 METS)    ECG reviewed  Rhythm: regular  Rate: normal    (+) dysrhythmias (WPW), hyperlipidemia,     ROS comment: EKG 3/10/20:  - NORMAL ECG -  Sinus rhythm  NO PRIOR TRACING AVAILABLE FOR COMPARISON    EF 50-55%    Neuro/Psych  (+) TIA (2018), psychiatric history Anxiety and Depression,    GI/Hepatic/Renal/Endo    (+)  GERD,  thyroid problem hypothyroidism    Musculoskeletal     Abdominal  - normal exam   Substance History   (+) alcohol use (3 drinks per week),      OB/GYN          Other   arthritis (RA),                      Anesthesia Plan    ASA 2     MAC     intravenous induction     Anesthetic plan, risks, benefits, and alternatives have been provided, discussed and informed consent has been obtained with: patient.    Use of blood products discussed with patient  Consented to blood products.       CODE STATUS:

## 2022-11-23 NOTE — ANESTHESIA POSTPROCEDURE EVALUATION
Patient: Yelena Rangel    Procedure Summary     Date: 11/23/22 Room / Location: [facility] ENDOSCOPY 1 /  [facility]    Anesthesia Start: 1416 Anesthesia Stop: 1446    Procedure: COLONOSCOPY WITH BIOPSIES, POLYPECTOMY Diagnosis:       Ulcerative pancolitis (HCC)      Diverticulosis      Colon polyp      (Ulcerative pancolitis (HCC) [K51.00])    Surgeons: Dl Saldaña MD Provider: Earl Bryson CRNA    Anesthesia Type: MAC ASA Status: 2          Anesthesia Type: MAC    Vitals  Vitals Value Taken Time   BP 97/68 11/23/22 1524   Temp 97.4 °F (36.3 °C) 11/23/22 1454   Pulse 78 11/23/22 1524   Resp 16 11/23/22 1524   SpO2 99 % 11/23/22 1524           Post Anesthesia Care and Evaluation    Patient location during evaluation: bedside  Patient participation: complete - patient participated  Level of consciousness: awake and alert  Pain score: 0  Pain management: adequate    Airway patency: patent  Anesthetic complications: No anesthetic complications  PONV Status: none  Cardiovascular status: acceptable  Respiratory status: acceptable  Hydration status: acceptable  No anesthesia care post op

## 2022-11-28 DIAGNOSIS — K21.00 GASTROESOPHAGEAL REFLUX DISEASE WITH ESOPHAGITIS WITHOUT HEMORRHAGE: ICD-10-CM

## 2022-11-28 LAB
LAB AP CASE REPORT: NORMAL
LAB AP DIAGNOSIS COMMENT: NORMAL
PATH REPORT.FINAL DX SPEC: NORMAL
PATH REPORT.GROSS SPEC: NORMAL

## 2022-11-28 RX ORDER — OMEPRAZOLE 20 MG/1
20 CAPSULE, DELAYED RELEASE ORAL DAILY
Qty: 90 CAPSULE | Refills: 1 | Status: SHIPPED | OUTPATIENT
Start: 2022-11-28

## 2022-11-28 RX ORDER — VENLAFAXINE HYDROCHLORIDE 75 MG/1
75 CAPSULE, EXTENDED RELEASE ORAL DAILY
Qty: 90 CAPSULE | Refills: 1 | Status: SHIPPED | OUTPATIENT
Start: 2022-11-28

## 2022-11-28 NOTE — TELEPHONE ENCOUNTER
Rx Refill Note  Requested Prescriptions     Pending Prescriptions Disp Refills   • omeprazole (priLOSEC) 20 MG capsule 90 capsule 1     Sig: Take 1 capsule by mouth Daily.     Signed Prescriptions Disp Refills   • venlafaxine XR (EFFEXOR-XR) 75 MG 24 hr capsule 90 capsule 1     Sig: Take 1 capsule by mouth Daily.     Authorizing Provider: BRIAN ROBERTS     Ordering User: AYANNA CORDOVA      Last office visit with prescribing clinician: 11/16/2022      Next office visit with prescribing clinician: Visit date not found            Ayanna Cordova MA  11/28/22, 15:04 EST

## 2022-11-29 DIAGNOSIS — M81.0 AGE-RELATED OSTEOPOROSIS WITHOUT CURRENT PATHOLOGICAL FRACTURE: Primary | ICD-10-CM

## 2022-11-30 ENCOUNTER — HOSPITAL ENCOUNTER (OUTPATIENT)
Dept: INFUSION THERAPY | Facility: HOSPITAL | Age: 72
Setting detail: INFUSION SERIES
Discharge: HOME | End: 2022-11-30

## 2022-11-30 VITALS
TEMPERATURE: 96.7 F | SYSTOLIC BLOOD PRESSURE: 129 MMHG | OXYGEN SATURATION: 93 % | HEART RATE: 82 BPM | DIASTOLIC BLOOD PRESSURE: 92 MMHG

## 2022-11-30 DIAGNOSIS — M81.0 AGE-RELATED OSTEOPOROSIS WITHOUT CURRENT PATHOLOGICAL FRACTURE: Primary | ICD-10-CM

## 2022-11-30 PROCEDURE — 25010000002 DENOSUMAB 60 MG/ML SOLUTION PREFILLED SYRINGE: Performed by: FAMILY MEDICINE

## 2022-11-30 PROCEDURE — 96372 THER/PROPH/DIAG INJ SC/IM: CPT

## 2022-11-30 RX ADMIN — DENOSUMAB 60 MG: 60 INJECTION SUBCUTANEOUS at 09:13

## 2022-12-16 DIAGNOSIS — Z11.1 SCREENING FOR TUBERCULOSIS: Primary | ICD-10-CM

## 2022-12-16 DIAGNOSIS — Z11.59 NEED FOR HEPATITIS B SCREENING TEST: Primary | ICD-10-CM

## 2022-12-16 DIAGNOSIS — Z11.59 NEED FOR HEPATITIS B SCREENING TEST: ICD-10-CM

## 2022-12-16 DIAGNOSIS — Z11.1 SCREENING FOR TUBERCULOSIS: ICD-10-CM

## 2022-12-22 ENCOUNTER — HOSPITAL ENCOUNTER (OUTPATIENT)
Dept: MAMMOGRAPHY | Facility: HOSPITAL | Age: 72
Discharge: HOME | End: 2022-12-22
Admitting: FAMILY MEDICINE

## 2022-12-22 ENCOUNTER — OFFICE VISIT (OUTPATIENT)
Dept: GASTROENTEROLOGY | Facility: CLINIC | Age: 72
End: 2022-12-22

## 2022-12-22 VITALS
SYSTOLIC BLOOD PRESSURE: 122 MMHG | HEIGHT: 62 IN | WEIGHT: 182.4 LBS | BODY MASS INDEX: 33.57 KG/M2 | DIASTOLIC BLOOD PRESSURE: 82 MMHG

## 2022-12-22 DIAGNOSIS — M05.79 RHEUMATOID ARTHRITIS INVOLVING MULTIPLE SITES WITH POSITIVE RHEUMATOID FACTOR: ICD-10-CM

## 2022-12-22 DIAGNOSIS — Z12.31 ENCOUNTER FOR SCREENING MAMMOGRAM FOR MALIGNANT NEOPLASM OF BREAST: ICD-10-CM

## 2022-12-22 DIAGNOSIS — K51.00 ULCERATIVE PANCOLITIS WITHOUT COMPLICATION: Primary | ICD-10-CM

## 2022-12-22 DIAGNOSIS — Z11.59 ENCOUNTER FOR SCREENING FOR OTHER VIRAL DISEASES: ICD-10-CM

## 2022-12-22 DIAGNOSIS — K21.00 GASTROESOPHAGEAL REFLUX DISEASE WITH ESOPHAGITIS, UNSPECIFIED WHETHER HEMORRHAGE: ICD-10-CM

## 2022-12-22 DIAGNOSIS — K51.00 ULCERATIVE PANCOLITIS: ICD-10-CM

## 2022-12-22 PROCEDURE — 77063 BREAST TOMOSYNTHESIS BI: CPT

## 2022-12-22 PROCEDURE — 77067 SCR MAMMO BI INCL CAD: CPT

## 2022-12-22 PROCEDURE — 99213 OFFICE O/P EST LOW 20 MIN: CPT | Performed by: INTERNAL MEDICINE

## 2022-12-22 NOTE — PROGRESS NOTES
PATIENT INFORMATION  Yelena Rangel       - 1950    CHIEF COMPLAINT  Chief Complaint   Patient presents with   • Ulcerative Colitis       HISTORY OF PRESENT ILLNESS  Just had her COlon which had an inflmammatory polyp and no colitis on exam or biopsy and is managed on Remicade only     Does take hr PPI and does well w/o Breakthrough    Had dizzyness after her scope which sounds like BPV but only lasted a dy and is gone    Need remicade reordered      REVIEWED PERTINENT RESULTS/ LABS  Lab Results   Component Value Date    CASEREPORT  2022     Surgical Pathology Report                         Case: [id number]                                  Authorizing Provider:  Dl Saldaña        Collected:           2022 02:29 PM                                 MD Ivana                                                                   Ordering Location:     [facility]   Received:            2022 03:24 PM                                 OR                                                                           Pathologist:           Naveen Peraza MD                                                         Specimens:   1) - Large Intestine, Right / Ascending Colon, biopsies                                             2) - Large Intestine, Transverse Colon, biopsies                                                    3) - Large Intestine, Sigmoid Colon, biopsies                                                       4) - Large Intestine, Sigmoid Colon, polyp at 20 cm                                                 5) - Large Intestine, Rectum, biopsies                                                     FINALDX  2022     1. Colon, Right Ascending, Biopsy:  Benign colonic mucosa with   A. No hyperplastic or tubulovillous change.   B. No significant inflammation.   C. No crypt distortion or basement membrane thickening.   D. No viral inclusions or other organisms on  routinely stained sections.   E. Prominent lymphoid aggregates.    2. Colon, Transverse, Biopsy:  Benign colonic mucosa with   A. No hyperplastic or tubulovillous change.   B. No significant inflammation.   C. No crypt distortion or basement membrane thickening.   D. No viral inclusions or other organisms on routinely stained sections.   E. Prominent lymphoid aggregates.    3. Colon, Sigmoid, Biopsy:  Benign colonic mucosa with   A. No significant inflammation.   B. No cryptitis or crypt abscess.   C. No dysplasia.   D. Minimal distortion of the crypt architecture.    4. Colon, Sigmoid at 20 cm, Biopsy:  Benign colonic mucosa with   A. Focal erosion.   B. Acute inflammation of the lamina propria.   C. Granulation like tissue.   D. Hyperplastic changes.   E. No dysplasia.    5. Rectum, Biopsy:  Benign colonic mucosa with   A. No hyperplastic or tubulovillous change.   B. No significant inflammation.   C. No crypt distortion or basement membrane thickening.   D. No viral inclusions or other organisms on routinely stained sections.    LYNDSAY/clm       Lab Results   Component Value Date    HGB 13.0 11/09/2022    MCV 93.2 11/09/2022     11/09/2022    ALT 10 11/09/2022    AST 19 11/09/2022    HGBA1C 5.70 (H) 11/09/2022    INR 1.08 03/10/2020    TRIG 178 (H) 11/09/2022      No results found.    REVIEW OF SYSTEMS  Review of Systems   Constitutional: Positive for fatigue.   HENT: Negative.    Eyes: Negative.    Respiratory: Negative.    Cardiovascular: Negative.    Gastrointestinal: Positive for abdominal distention.        UC   Endocrine: Negative.    Genitourinary: Negative.    Musculoskeletal: Negative.    Skin: Negative.    Allergic/Immunologic: Negative.    Neurological: Negative.    Hematological: Bruises/bleeds easily.   Psychiatric/Behavioral: Negative.          ACTIVE PROBLEMS  Patient Active Problem List    Diagnosis    • Ulcerative pancolitis (HCC) [K51.00]    • Acute upper respiratory infection [J06.9]    •  Gastroesophageal reflux disease with esophagitis [K21.00]    • GERD (gastroesophageal reflux disease) [K21.9]    • Hematuria [R31.9]    • Osteoarthritis of knee [M17.9]    • Plantar fasciitis [M72.2]    • Routine health maintenance [Z00.00]    • Rheumatoid arthritis involving multiple sites with positive rheumatoid factor (HCC) [M05.79]    • TIA (transient ischemic attack) [G45.9]    • Preop cardiovascular exam [Z01.810]    • Leukopenia [D72.819]    • Depression [F32.A]    • Hyperlipidemia [E78.5]    • Hypothyroidism [E03.9]    • Age-related osteoporosis without current pathological fracture [M81.0]    • WPW (Liseth-Parkinson-White syndrome) [I45.6]    • Non-specific colitis [K52.9]    • Allergic rhinitis [J30.9]    • Primary osteoarthritis of knees, bilateral [M17.0]          PAST MEDICAL HISTORY  Past Medical History:   Diagnosis Date   • Anxiety    • Arthritis    • Arthritis    • Colon polyp    • Disease of thyroid gland    • Environmental allergies    • GERD (gastroesophageal reflux disease)    • Hyperlipidemia    • Hypothyroidism    • Osteopenia    • Rheumatoid arthritis (HCC)    • Shingles    • TIA (transient ischemic attack)    • 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)    • Ulcerative colitis (HCC)    • WPW (Liseth-Parkinson-White syndrome)          SURGICAL HISTORY  Past Surgical History:   Procedure Laterality Date   • COLONOSCOPY N/A 5/13/2016    Procedure: COLONOSCOPY with biopsies;  Surgeon: Dl Saldaña MD;  Location: [facility] OR;  Service:    • COLONOSCOPY N/A 11/23/2022    Procedure: COLONOSCOPY WITH BIOPSIES, POLYPECTOMY;  Surgeon: Dl Saldaña MD;  Location: [facility] OR;  Service: Gastroenterology;  Laterality: N/A;  Diverticulosis  Biopsies: right colon, transverse, sigmoid, and rectal biopsies  Sigmoid colon polyp at 20cm   • COLONOSCOPY W/ POLYPECTOMY     • HYSTERECTOMY     • [facility]AC[facility] KNEE [facility]   • SIGMOIDOSCOPY N/A 12/13/2017    Procedure: SIGMOIDOSCOPY FLEXIBLE with biopsy;  Surgeon: Dl Saldaña MD;  Location: [facility];  Service:    • THYROID LOBECTOMY Right    • TOTAL KNEE ARTHROPLASTY Right 11/20/2018         FAMILY HISTORY  Family History   Problem Relation Age of Onset   • Breast cancer Mother    • Breast cancer Sister    • Colon cancer Father    • Colon cancer Brother          SOCIAL HISTORY  Social History     Occupational History   • Not on file   Tobacco Use   • Smoking status: Never   • Smokeless tobacco: Never   Vaping Use   • Vaping Use: Never used   Substance and Sexual Activity   • Alcohol use: Yes     Alcohol/week: 3.0 standard drinks     Types: 3 Cans of beer per week     Comment: twice week//caffeine yes   • Drug use: No   • Sexual activity: Defer         CURRENT MEDICATIONS    Current Outpatient Medications:   •  Acetaminophen (TYLENOL ARTHRITIS PAIN PO), Take 1 tablet by mouth 3 (Three) Times a Day. AS NEEDED FOR ARTHRITIS, Disp: , Rfl:   •  aspirin 81 MG chewable tablet, Chew 81 mg Daily., Disp: , Rfl:   •  Calcium Carb-Cholecalciferol (CALCIUM PLUS VITAMIN D3 PO), Take 1,400 mg by mouth Daily. D3 = 1500 IU, Disp: , Rfl:   •  cetirizine (ZyrTEC) 10 MG tablet, Take 10 mg by mouth daily., Disp: , Rfl:   •  denosumab (PROLIA) 60 MG/ML solution prefilled syringe syringe, Inject  under the skin into the appropriate area as directed 1 (One) Time., Disp: , Rfl:   •  InFLIXimab (REMICADE IV), Infuse 1 dose into a venous catheter Every 30 (Thirty) Days. Every 60 days.  Dr. Saldaña., Disp: , Rfl:   •  levothyroxine (SYNTHROID, LEVOTHROID) 100 MCG tablet, Take 1 tablet by mouth Daily., Disp: 90 tablet, Rfl: 1  •  Multiple Vitamins-Minerals (MULTIVITAMIN WITH MINERALS) tablet tablet, Take 1 tablet by mouth Daily., Disp: , Rfl:   •  omeprazole (priLOSEC) 20 MG capsule, Take 1 capsule by mouth Daily., Disp: 90 capsule, Rfl: 1  •  pravastatin (PRAVACHOL) 10 MG  "tablet, Take 1 tablet by mouth Daily., Disp: 90 tablet, Rfl: 1  •  Probiotic Product (PROBIOTIC DAILY PO), Take 2 capsules by mouth Daily. OTC Probiotic, Disp: , Rfl:   •  venlafaxine XR (EFFEXOR-XR) 75 MG 24 hr capsule, Take 1 capsule by mouth Daily., Disp: 90 capsule, Rfl: 1    ALLERGIES  Sulfa antibiotics    VITALS  Vitals:    12/22/22 0919   BP: 122/82   BP Location: Left arm   Patient Position: Sitting   Cuff Size: Adult   Weight: 82.7 kg (182 lb 6.4 oz)   Height: 157.5 cm (62.01\")       PHYSICAL EXAM  Debilities/Disabilities Identified: None  Emotional Behavior: Appropriate  Wt Readings from Last 3 Encounters:   12/22/22 82.7 kg (182 lb 6.4 oz)   11/23/22 80.8 kg (178 lb 3.2 oz)   11/16/22 80.3 kg (177 lb)     Ht Readings from Last 1 Encounters:   12/22/22 157.5 cm (62.01\")     Body mass index is 33.35 kg/m².  Physical Exam  Constitutional:       Appearance: She is well-developed. She is not diaphoretic.   HENT:      Head: Normocephalic and atraumatic.   Eyes:      General: No scleral icterus.     Conjunctiva/sclera: Conjunctivae normal.      Pupils: Pupils are equal, round, and reactive to light.   Neck:      Thyroid: No thyromegaly.   Cardiovascular:      Rate and Rhythm: Normal rate and regular rhythm.      Heart sounds: Normal heart sounds. No murmur heard.    No gallop.   Pulmonary:      Effort: Pulmonary effort is normal.      Breath sounds: Normal breath sounds. No wheezing or rales.   Abdominal:      General: Bowel sounds are normal. There is no distension or abdominal bruit.      Palpations: Abdomen is soft. There is no shifting dullness, fluid wave or mass.      Tenderness: There is no abdominal tenderness. There is no guarding. Negative signs include Sorto's sign.      Hernia: There is no hernia in the ventral area.   Musculoskeletal:         General: Normal range of motion.      Cervical back: Normal range of motion and neck supple.   Lymphadenopathy:      Cervical: No cervical adenopathy. "   Skin:     General: Skin is warm and dry.      Findings: No erythema or rash.   Neurological:      Mental Status: She is alert and oriented to person, place, and time.   Psychiatric:         Mood and Affect: Mood normal.         Behavior: Behavior normal.         CLINICAL DATA REVIEWED   reviewed previous lab results and integrated with today's visit, reviewed notes from other physicians and/or last GI encounter, reviewed previous endoscopy results and available photos, reviewed surgical pathology results from previous biopsies    ASSESSMENT  Diagnoses and all orders for this visit:    Ulcerative pancolitis without complication (HCC)  -     HBV Core Antibody, IgG / IgM Diff; Future  -     QuantiFERON TB Plus Client Incubated; Future    Encounter for screening for other viral diseases  -     HBV Core Antibody, IgG / IgM Diff; Future    Rheumatoid arthritis involving multiple sites with positive rheumatoid factor (HCC)    Gastroesophageal reflux disease with esophagitis, unspecified whether hemorrhage    Ulcerative pancolitis (HCC)          PLAN  Return in about 6 months (around 6/22/2023).    I have discussed the above plan with the patient.  They verbalize understanding and are in agreement with the plan.  They have been advised to contact the office for any questions, concerns, or changes related to their health.

## 2022-12-28 ENCOUNTER — TELEPHONE (OUTPATIENT)
Dept: GASTROENTEROLOGY | Facility: CLINIC | Age: 72
End: 2022-12-28

## 2023-01-03 DIAGNOSIS — K51.00 ULCERATIVE PANCOLITIS WITHOUT COMPLICATION: Primary | ICD-10-CM

## 2023-01-09 ENCOUNTER — TELEPHONE (OUTPATIENT)
Dept: GASTROENTEROLOGY | Facility: CLINIC | Age: 73
End: 2023-01-09
Payer: MEDICARE

## 2023-01-09 ENCOUNTER — TELEPHONE (OUTPATIENT)
Dept: INTERNAL MEDICINE | Facility: CLINIC | Age: 73
End: 2023-01-09

## 2023-01-09 NOTE — TELEPHONE ENCOUNTER
Pt called stated received letter from [facility] of denial/ clarification of  code      is for Injection of immune globulin gammaguard    [facility] left VM needing clarification on case # [id number] reg. Remicade at [phone].     Sent message to authorization department if PA was sent for above both.     Spoke with pt confirmed pt verbalized understanding past due for HBV and TB annual labs will need to get done soon as possible or may delay in approval from [facility] for continuation.     PT scheduled for Remicade infusion wed needs clarification needs to reschedule her infusion due to insurance.

## 2023-01-09 NOTE — TELEPHONE ENCOUNTER
Caller: Yelena Rangel    Relationship to patient: Self    Best call back number: [phone]    Patient is needing: PLEASE CALL TO SCHEDULE LABS A WEEK BEFORE OFFICE VISIT ON 05/17/23.

## 2023-01-10 NOTE — TELEPHONE ENCOUNTER
Per authorization department PA entered incorrect but has be re-submitted. Instructed not for pt to cancel tomorrows infusion.    Sent my chart message to patient with update.

## 2023-01-10 NOTE — TELEPHONE ENCOUNTER
Called spoke with patient and successfully scheduled her lab appoint one week prior to office visit in May. HUB to share.

## 2023-01-11 ENCOUNTER — HOSPITAL ENCOUNTER (OUTPATIENT)
Dept: INFUSION THERAPY | Facility: HOSPITAL | Age: 73
Setting detail: INFUSION SERIES
Discharge: HOME | End: 2023-01-11
Payer: MEDICARE

## 2023-01-11 VITALS
HEART RATE: 78 BPM | OXYGEN SATURATION: 95 % | WEIGHT: 178 LBS | SYSTOLIC BLOOD PRESSURE: 129 MMHG | TEMPERATURE: 97 F | RESPIRATION RATE: 16 BRPM | DIASTOLIC BLOOD PRESSURE: 83 MMHG | BODY MASS INDEX: 32.55 KG/M2

## 2023-01-11 DIAGNOSIS — K51.00 ULCERATIVE PANCOLITIS: Primary | ICD-10-CM

## 2023-01-11 PROCEDURE — 96413 CHEMO IV INFUSION 1 HR: CPT

## 2023-01-11 PROCEDURE — 96415 CHEMO IV INFUSION ADDL HR: CPT

## 2023-01-11 PROCEDURE — 25010000002 INFLIXIMAB PER 10 MG: Performed by: INTERNAL MEDICINE

## 2023-01-11 RX ADMIN — INFLIXIMAB 800 MG: 100 INJECTION, POWDER, LYOPHILIZED, FOR SOLUTION INTRAVENOUS at 08:36

## 2023-01-11 NOTE — NURSING NOTE
NURSING PROGRESS NOTE:    PATIENT ARRIVE TO [facility] FOR SCHEDULED INFUSION AT 0810 .  PROCEDURE WAS PERFORMED WITHOUT INCIDENT.   PATIENT DISCHARGED HOME AT 1050 WITHOUT C/O . KAILYN LEE

## 2023-02-17 ENCOUNTER — LAB (OUTPATIENT)
Dept: LAB | Facility: HOSPITAL | Age: 73
End: 2023-02-17
Payer: MEDICARE

## 2023-02-17 DIAGNOSIS — Z11.59 ENCOUNTER FOR SCREENING FOR OTHER VIRAL DISEASES: ICD-10-CM

## 2023-02-17 DIAGNOSIS — K51.00 ULCERATIVE PANCOLITIS WITHOUT COMPLICATION: ICD-10-CM

## 2023-02-17 PROCEDURE — 36415 COLL VENOUS BLD VENIPUNCTURE: CPT

## 2023-02-17 PROCEDURE — 86704 HEP B CORE ANTIBODY TOTAL: CPT

## 2023-02-17 PROCEDURE — 86705 HEP B CORE ANTIBODY IGM: CPT

## 2023-02-18 LAB
HBV CORE AB SERPL QL IA: NEGATIVE
HBV CORE IGM SERPL QL IA: NEGATIVE

## 2023-03-02 ENCOUNTER — TELEPHONE (OUTPATIENT)
Dept: INTERNAL MEDICINE | Facility: CLINIC | Age: 73
End: 2023-03-02

## 2023-03-02 RX ORDER — CIPROFLOXACIN 250 MG/1
250 TABLET, FILM COATED ORAL 2 TIMES DAILY
Qty: 6 TABLET | Refills: 0 | Status: SHIPPED | OUTPATIENT
Start: 2023-03-02 | End: 2023-03-05

## 2023-03-02 NOTE — TELEPHONE ENCOUNTER
I prescribed cipro to her pharmacy.  If her symptoms do not quickly improve/resolved, she needs to get checked and have urine cultured.

## 2023-03-02 NOTE — TELEPHONE ENCOUNTER
Caller: Yelena Rangel    Relationship: Self    Best call back number: [phone]    What medication are you requesting: ANTIBIOTIC    What are your current symptoms: FREQUENT URINATION AND BURNING     How long have you been experiencing symptoms: 2 DAYS     Have you had these symptoms before:    [x] Yes  [] No    Have you been treated for these symptoms before:   [x] Yes  [] No    If a prescription is needed, what is your preferred pharmacy and phone number: [facility] [id number] - [place] - [address] - [phone]  - [phone] FX     Additional notes: PLEASE CALL TO LET PATIENT KNOW IF MEDICATION CAN BE PRESCRIBED.

## 2023-03-07 ENCOUNTER — NURSE TRIAGE (OUTPATIENT)
Dept: CALL CENTER | Facility: HOSPITAL | Age: 73
End: 2023-03-07
Payer: MEDICARE

## 2023-03-07 NOTE — TELEPHONE ENCOUNTER
"Productive cough for a month (greenish yellow). Worse in morning. Makes her short of breath when she is coughing.   Wheezing in between. Just trying to get appt. Mild    Reason for Disposition  • [1] MILD difficulty breathing (e.g., minimal/no SOB at rest, SOB with walking, pulse <100) AND [2] still present when not coughing    Additional Information  • Negative: SEVERE difficulty breathing (e.g., struggling for each breath, speaks in single words)  • Negative: Bluish (or gray) lips or face now  • Negative: [1] Difficulty breathing AND [2] exposure to flames, smoke, or fumes  • Negative: [1] Stridor AND [2] difficulty breathing  • Negative: Sounds like a life-threatening emergency to the triager  • Negative: [1] Previous asthma attacks AND [2] this feels like asthma attack  • Negative: Dry cough (non-productive;  no sputum or minimal clear sputum)  • Negative: [1] MODERATE difficulty breathing (e.g., speaks in phrases, SOB even at rest, pulse 100-120) AND [2] still present when not coughing  • Negative: Chest pain  (Exception: MILD central chest pain, present only when coughing)  • Negative: Patient sounds very sick or weak to the triager    Answer Assessment - Initial Assessment Questions  1. ONSET: \"When did the cough begin?\"       A month ago  2. SEVERITY: \"How bad is the cough today?\"       mild  3. SPUTUM: \"Describe the color of your sputum\" (none, dry cough; clear, white, yellow, green)      Greenish yellow  4. HEMOPTYSIS: \"Are you coughing up any blood?\" If so ask: \"How much?\" (flecks, streaks, tablespoons, etc.)      no  5. DIFFICULTY BREATHING: \"Are you having difficulty breathing?\" If Yes, ask: \"How bad is it?\" (e.g., mild, moderate, severe)     - MILD: No SOB at rest, mild SOB with walking, speaks normally in sentences, can lay down, no retractions, pulse < 100.     - MODERATE: SOB at rest, SOB with minimal exertion and prefers to sit, cannot lie down flat, speaks in phrases, mild retractions, audible " "wheezing, pulse 100-120.     - SEVERE: Very SOB at rest, speaks in single words, struggling to breathe, sitting hunched forward, retractions, pulse > 120       mild  6. FEVER: \"Do you have a fever?\" If Yes, ask: \"What is your temperature, how was it measured, and when did it start?\"      no  7. CARDIAC HISTORY: \"Do you have any history of heart disease?\" (e.g., heart attack, congestive heart failure)       no  8. LUNG HISTORY: \"Do you have any history of lung disease?\"  (e.g., pulmonary embolus, asthma, emphysema)     no  9. PE RISK FACTORS: \"Do you have a history of blood clots?\" (or: recent major surgery, recent prolonged travel, bedridden)      no  10. OTHER SYMPTOMS: \"Do you have any other symptoms?\" (e.g., runny nose, wheezing, chest pain)        wheezing  11. PREGNANCY: \"Is there any chance you are pregnant?\" \"When was your last menstrual period?\"        no  12. TRAVEL: \"Have you traveled out of the country in the last month?\" (e.g., travel history, exposures)        no    Protocols used: COUGH - ACUTE PRODUCTIVE-ADULT-AH    "

## 2023-03-08 ENCOUNTER — HOSPITAL ENCOUNTER (OUTPATIENT)
Dept: INFUSION THERAPY | Facility: HOSPITAL | Age: 73
Discharge: HOME | End: 2023-03-08
Admitting: INTERNAL MEDICINE
Payer: MEDICARE

## 2023-03-08 VITALS
DIASTOLIC BLOOD PRESSURE: 80 MMHG | HEART RATE: 69 BPM | WEIGHT: 178 LBS | OXYGEN SATURATION: 96 % | BODY MASS INDEX: 32.55 KG/M2 | RESPIRATION RATE: 16 BRPM | SYSTOLIC BLOOD PRESSURE: 140 MMHG | TEMPERATURE: 98.2 F

## 2023-03-08 DIAGNOSIS — K51.00 ULCERATIVE PANCOLITIS: Primary | ICD-10-CM

## 2023-03-08 DIAGNOSIS — Z11.59 ENCOUNTER FOR SCREENING FOR OTHER VIRAL DISEASES: ICD-10-CM

## 2023-03-08 DIAGNOSIS — K51.00 ULCERATIVE PANCOLITIS WITHOUT COMPLICATION: ICD-10-CM

## 2023-03-08 PROCEDURE — 96413 CHEMO IV INFUSION 1 HR: CPT

## 2023-03-08 PROCEDURE — 25010000002 INFLIXIMAB PER 10 MG: Performed by: INTERNAL MEDICINE

## 2023-03-08 PROCEDURE — 86480 TB TEST CELL IMMUN MEASURE: CPT | Performed by: INTERNAL MEDICINE

## 2023-03-08 PROCEDURE — 96415 CHEMO IV INFUSION ADDL HR: CPT

## 2023-03-08 RX ADMIN — INFLIXIMAB 800 MG: 100 INJECTION, POWDER, LYOPHILIZED, FOR SOLUTION INTRAVENOUS at 08:35

## 2023-03-08 NOTE — NURSING NOTE
Patient arrived to [facility] at 0820.  History and medications reviewed with patient.  Medication administered as ordered without complications.  AVS refused by patient.  Patient discharged at 1058 in stable condition without complaint.

## 2023-03-09 NOTE — TELEPHONE ENCOUNTER
Called and spoke with patient, she advised that at this time she did not want to make an appointment and I advised that if she continued to have the SOA and wheezing that she should either go to the ER or call the office to make an appointment. She stated her understanding

## 2023-03-10 LAB
GAMMA INTERFERON BACKGROUND BLD IA-ACNC: 0.07 IU/ML
M TB IFN-G BLD-IMP: NEGATIVE
M TB IFN-G CD4+ T-CELLS BLD-ACNC: 0.09 IU/ML
M TBIFN-G CD4+ CD8+T-CELLS BLD-ACNC: 0.09 IU/ML
MITOGEN IGNF BLD-ACNC: >10 IU/ML
QUANTIFERON INCUBATION: NORMAL
SERVICE CMNT-IMP: NORMAL

## 2023-03-23 ENCOUNTER — OFFICE VISIT (OUTPATIENT)
Dept: INTERNAL MEDICINE | Facility: CLINIC | Age: 73
End: 2023-03-23
Payer: MEDICARE

## 2023-03-23 ENCOUNTER — HOSPITAL ENCOUNTER (OUTPATIENT)
Dept: GENERAL RADIOLOGY | Facility: HOSPITAL | Age: 73
Discharge: HOME | End: 2023-03-23
Admitting: FAMILY MEDICINE
Payer: MEDICARE

## 2023-03-23 VITALS
HEART RATE: 77 BPM | HEIGHT: 62 IN | TEMPERATURE: 97.5 F | DIASTOLIC BLOOD PRESSURE: 88 MMHG | SYSTOLIC BLOOD PRESSURE: 130 MMHG | WEIGHT: 182.2 LBS | OXYGEN SATURATION: 93 % | BODY MASS INDEX: 33.53 KG/M2

## 2023-03-23 DIAGNOSIS — R06.2 WHEEZING: ICD-10-CM

## 2023-03-23 DIAGNOSIS — J40 BRONCHITIS: Primary | ICD-10-CM

## 2023-03-23 DIAGNOSIS — J40 BRONCHITIS: ICD-10-CM

## 2023-03-23 DIAGNOSIS — R32 URINARY INCONTINENCE, UNSPECIFIED TYPE: ICD-10-CM

## 2023-03-23 LAB
BILIRUB BLD-MCNC: NEGATIVE MG/DL
CLARITY, POC: CLEAR
COLOR UR: YELLOW
EXPIRATION DATE: ABNORMAL
GLUCOSE UR STRIP-MCNC: NEGATIVE MG/DL
KETONES UR QL: ABNORMAL
LEUKOCYTE EST, POC: ABNORMAL
Lab: ABNORMAL
NITRITE UR-MCNC: NEGATIVE MG/ML
PH UR: 5 [PH] (ref 5–8)
PROT UR STRIP-MCNC: ABNORMAL MG/DL
RBC # UR STRIP: ABNORMAL /UL
SP GR UR: 1.02 (ref 1–1.03)
UROBILINOGEN UR QL: ABNORMAL

## 2023-03-23 PROCEDURE — 81003 URINALYSIS AUTO W/O SCOPE: CPT | Performed by: FAMILY MEDICINE

## 2023-03-23 PROCEDURE — 71046 X-RAY EXAM CHEST 2 VIEWS: CPT

## 2023-03-23 PROCEDURE — 99214 OFFICE O/P EST MOD 30 MIN: CPT | Performed by: FAMILY MEDICINE

## 2023-03-23 RX ORDER — ALBUTEROL SULFATE 90 UG/1
2 AEROSOL, METERED RESPIRATORY (INHALATION) EVERY 4 HOURS PRN
Qty: 18 G | Refills: 0 | Status: SHIPPED | OUTPATIENT
Start: 2023-03-23

## 2023-03-23 RX ORDER — AZITHROMYCIN 250 MG/1
TABLET, FILM COATED ORAL
Qty: 6 TABLET | Refills: 0 | Status: SHIPPED | OUTPATIENT
Start: 2023-03-23

## 2023-03-23 NOTE — PROGRESS NOTES
Subjective   Yelena Rangel is a 72 y.o. female presenting today for follow up of   Chief Complaint   Patient presents with   • Cough     Going on for about a month. Tested herself for covid and it was negative.   • Wheezing   • Sore Throat     Tonsils were swollen but shes not sure if sore throat was due to her coughing        History of Present Illness     Pt presents with cough X 1 month.  She has had associated wheezing and shortness of breath and sinus congestion and sore throat.  No fevers.  The cough has been productive of phlegm.  The cough is worse at night and in the mornings.  No sick contacts.  She had a negative Covid-19 test at home.    Pt also reports intermittent bladder pressure and incontinence and frequency.    Patient Active Problem List   Diagnosis   • Primary osteoarthritis of knees, bilateral   • Depression   • Hyperlipidemia   • Hypothyroidism   • Age-related osteoporosis without current pathological fracture   • WPW (Liseth-Parkinson-White syndrome)   • Non-specific colitis   • Allergic rhinitis   • Leukopenia   • TIA (transient ischemic attack)   • Rheumatoid arthritis involving multiple sites with positive rheumatoid factor (HCC)   • Preop cardiovascular exam   • Acute upper respiratory infection   • Gastroesophageal reflux disease with esophagitis   • GERD (gastroesophageal reflux disease)   • Hematuria   • Osteoarthritis of knee   • Plantar fasciitis   • Routine health maintenance   • Ulcerative pancolitis (HCC)       Current Outpatient Medications on File Prior to Visit   Medication Sig   • Acetaminophen (TYLENOL ARTHRITIS PAIN PO) Take 1 tablet by mouth 3 (Three) Times a Day. AS NEEDED FOR ARTHRITIS   • aspirin 81 MG chewable tablet Chew 1 tablet Daily.   • Calcium Carb-Cholecalciferol (CALCIUM PLUS VITAMIN D3 PO) Take 1,400 mg by mouth Daily. D3 = 1500 IU   • cetirizine (ZyrTEC) 10 MG tablet Take 1 tablet by mouth Daily.   • denosumab (PROLIA) 60 MG/ML solution prefilled syringe syringe  "Inject  under the skin into the appropriate area as directed 1 (One) Time.   • InFLIXimab (REMICADE IV) Infuse 1 dose into a venous catheter Every 30 (Thirty) Days. Every 60 days.  Dr. Saldaña.   • levothyroxine (SYNTHROID, LEVOTHROID) 100 MCG tablet Take 1 tablet by mouth Daily.   • Multiple Vitamins-Minerals (MULTIVITAMIN WITH MINERALS) tablet tablet Take 1 tablet by mouth Daily.   • omeprazole (priLOSEC) 20 MG capsule Take 1 capsule by mouth Daily.   • pravastatin (PRAVACHOL) 10 MG tablet Take 1 tablet by mouth Daily.   • Probiotic Product (PROBIOTIC DAILY PO) Take 2 capsules by mouth Daily. OTC Probiotic   • venlafaxine XR (EFFEXOR-XR) 75 MG 24 hr capsule Take 1 capsule by mouth Daily.     No current facility-administered medications on file prior to visit.          The following portions of the patient's history were reviewed and updated as appropriate: allergies, current medications, past family history, past medical history, past social history, past surgical history and problem list.    Review of Systems   Constitutional: Negative for fever.   HENT: Positive for congestion, sinus pressure and sore throat.    Respiratory: Positive for cough, shortness of breath and wheezing.    Genitourinary: Positive for frequency and urinary incontinence.       Objective   Vitals:    03/23/23 1414   BP: 130/88   BP Location: Left arm   Patient Position: Sitting   Cuff Size: Adult   Pulse: 77   Temp: 97.5 °F (36.4 °C)   SpO2: 93%   Weight: 82.6 kg (182 lb 3.2 oz)   Height: 157.5 cm (62\")       BP Readings from Last 3 Encounters:   03/23/23 130/88   03/08/23 140/80   01/11/23 129/83        Wt Readings from Last 3 Encounters:   03/23/23 82.6 kg (182 lb 3.2 oz)   03/08/23 80.7 kg (178 lb)   01/11/23 80.7 kg (178 lb)        Body mass index is 33.32 kg/m².  Nursing notes and vitals reviewed.    Physical Exam  Vitals and nursing note reviewed.   Constitutional:       General: She is not in acute distress.     Appearance: Normal " appearance. She is not ill-appearing.   HENT:      Head: Normocephalic and atraumatic.      Right Ear: Tympanic membrane normal.      Left Ear: Tympanic membrane normal.      Nose:      Right Sinus: Maxillary sinus tenderness (mild) present.      Left Sinus: Maxillary sinus tenderness (mild) present.      Mouth/Throat:      Pharynx: Posterior oropharyngeal erythema present. No oropharyngeal exudate.      Tonsils: 3+ on the right. 3+ on the left.   Eyes:      Extraocular Movements: Extraocular movements intact.      Conjunctiva/sclera: Conjunctivae normal.   Cardiovascular:      Rate and Rhythm: Normal rate and regular rhythm.   Pulmonary:      Effort: Pulmonary effort is normal. No respiratory distress.      Breath sounds: Wheezing present. No rhonchi or rales.   Musculoskeletal:      Cervical back: Neck supple. No rigidity.   Skin:     General: Skin is warm and dry.   Neurological:      General: No focal deficit present.      Mental Status: She is alert and oriented to person, place, and time.   Psychiatric:         Mood and Affect: Mood normal.         Behavior: Behavior normal.         Recent Results (from the past 672 hour(s))   QuantiFERON-TB Gold Plus    Collection Time: 03/08/23  8:20 AM    Specimen: Blood   Result Value Ref Range    QuantiFERON Incubation Incubation performed.     QUANTIFERON-TB GOLD PLUS Negative Negative   QuantiFERON-TB Gold Plus    Collection Time: 03/08/23  8:20 AM    Specimen: Blood   Result Value Ref Range    QuantiFERON Criteria Comment     QUANTIFERON TB1 AG VALUE 0.09 IU/mL    QUANTIFERON TB2 AG VALUE 0.09 IU/mL    QuantiFERON Nil Value 0.07 IU/mL    QuantiFERON Mitogen Value >10.00 IU/mL         Assessment & Plan   Diagnoses and all orders for this visit:    1. Bronchitis (Primary)  -     XR Chest PA & Lateral; Future  -     azithromycin (Zithromax Z-Tres) 250 MG tablet; Take 2 tablets by mouth on day 1, then 1 tablet daily on days 2-5  Dispense: 6 tablet; Refill: 0    2.  Wheezing  -     XR Chest PA & Lateral; Future  -     albuterol sulfate  (90 Base) MCG/ACT inhaler; Inhale 2 puffs Every 4 (Four) Hours As Needed for Wheezing.  Dispense: 18 g; Refill: 0    3. Urinary incontinence, unspecified type  -     POCT urinalysis dipstick, automated  -     Urine Culture - Urine, Urine, Clean Catch; Future      Check CXR.  No prior hx asthma or wheezing.  Light smoker X 10 years in the past.  Treat with azithromycin, albuterol.  Trelegy sample given: use X 14 days.    Check urinalysis and culture.        Medications, including side effects, were discussed with the patient. Patient verbalized understanding.  The plan of care was discussed. All questions were answered. Patient verbalized understanding.      Return if symptoms worsen or fail to improve, for Next scheduled follow up.

## 2023-03-25 LAB
BACTERIA UR CULT: NORMAL
BACTERIA UR CULT: NORMAL

## 2023-03-29 ENCOUNTER — TELEPHONE (OUTPATIENT)
Dept: INTERNAL MEDICINE | Facility: CLINIC | Age: 73
End: 2023-03-29
Payer: MEDICARE

## 2023-04-21 DIAGNOSIS — R06.2 WHEEZING: ICD-10-CM

## 2023-04-21 NOTE — TELEPHONE ENCOUNTER
Caller: Juan Carlos Yelena GAMBLE    Relationship: Self    Best call back number: [phone]    Requested Prescriptions:   Requested Prescriptions     Pending Prescriptions Disp Refills   • albuterol sulfate  (90 Base) MCG/ACT inhaler 18 g 0     Sig: Inhale 2 puffs Every 4 (Four) Hours As Needed for Wheezing.        Pharmacy where request should be sent: [facility] [id number] [facility] [address] [facility] [address] - [phone]  - [phone]     Last office visit with prescribing clinician: 3/23/2023   Last telemedicine visit with prescribing clinician: 5/10/2023   Next office visit with prescribing clinician: 5/17/2023     Additional details provided by patient: PATIENT HAS A 2 DAY SUPPLY LEFT OF THIS MEDICATION.       Does the patient have less than a 3 day supply:  [x] Yes  [] No    Would you like a call back once the refill request has been completed: [x] Yes [] No    If the office needs to give you a call back, can they leave a voicemail: [x] Yes [] No    Bill Stock Rep   04/21/23 10:14 EDT

## 2023-04-24 RX ORDER — ALBUTEROL SULFATE 90 UG/1
2 AEROSOL, METERED RESPIRATORY (INHALATION) EVERY 4 HOURS PRN
Qty: 18 G | Refills: 0 | Status: SHIPPED | OUTPATIENT
Start: 2023-04-24

## 2023-04-24 NOTE — TELEPHONE ENCOUNTER
Rx Refill Note  Requested Prescriptions     Pending Prescriptions Disp Refills   • albuterol sulfate  (90 Base) MCG/ACT inhaler 18 g 0     Sig: Inhale 2 puffs Every 4 (Four) Hours As Needed for Wheezing.      Last office visit with prescribing clinician: 3/23/2023   Last telemedicine visit with prescribing clinician: 5/10/2023   Next office visit with prescribing clinician: 5/17/2023                         Would you like a call back once the refill request has been completed: [] Yes [] No    If the office needs to give you a call back, can they leave a voicemail: [] Yes [] No    Keiko Desai  04/24/23, 14:10 EDT

## 2023-04-27 ENCOUNTER — OFFICE VISIT (OUTPATIENT)
Dept: INTERNAL MEDICINE | Facility: CLINIC | Age: 73
End: 2023-04-27
Payer: MEDICARE

## 2023-04-27 VITALS
HEART RATE: 85 BPM | HEIGHT: 62 IN | BODY MASS INDEX: 33.31 KG/M2 | WEIGHT: 181 LBS | TEMPERATURE: 97.3 F | SYSTOLIC BLOOD PRESSURE: 134 MMHG | DIASTOLIC BLOOD PRESSURE: 86 MMHG | OXYGEN SATURATION: 94 %

## 2023-04-27 DIAGNOSIS — J40 BRONCHITIS: Primary | ICD-10-CM

## 2023-04-27 PROCEDURE — 99213 OFFICE O/P EST LOW 20 MIN: CPT | Performed by: FAMILY MEDICINE

## 2023-04-27 PROCEDURE — 1160F RVW MEDS BY RX/DR IN RCRD: CPT | Performed by: FAMILY MEDICINE

## 2023-04-27 PROCEDURE — 1159F MED LIST DOCD IN RCRD: CPT | Performed by: FAMILY MEDICINE

## 2023-04-27 RX ORDER — FLUTICASONE PROPIONATE AND SALMETEROL 250; 50 UG/1; UG/1
1 POWDER RESPIRATORY (INHALATION)
Qty: 1 EACH | Refills: 2 | Status: SHIPPED | OUTPATIENT
Start: 2023-04-27

## 2023-04-27 RX ORDER — DOXYCYCLINE HYCLATE 100 MG/1
100 CAPSULE ORAL 2 TIMES DAILY
Qty: 20 CAPSULE | Refills: 0 | Status: SHIPPED | OUTPATIENT
Start: 2023-04-27 | End: 2023-05-07

## 2023-04-27 NOTE — PROGRESS NOTES
Subjective   Yelena Rangel is a 72 y.o. female presenting today for follow up of   Chief Complaint   Patient presents with   • Cough     Pt states as soon as she finishes medicine the cough and wheezing comes back. Having SOB.   • Wheezing   • Headache     Headache Saturday, throbbing on Sunday, continued into Monday and Tuesday       History of Present Illness     Pt presents with 6 days of productive cough, wheezing, headaches first few days.  Denies fevers, congestion, sinus pressure.    Patient Active Problem List   Diagnosis   • Primary osteoarthritis of knees, bilateral   • Depression   • Hyperlipidemia   • Hypothyroidism   • Age-related osteoporosis without current pathological fracture   • WPW (Liseth-Parkinson-White syndrome)   • Non-specific colitis   • Allergic rhinitis   • Leukopenia   • TIA (transient ischemic attack)   • Rheumatoid arthritis involving multiple sites with positive rheumatoid factor   • Preop cardiovascular exam   • Acute upper respiratory infection   • Gastroesophageal reflux disease with esophagitis   • GERD (gastroesophageal reflux disease)   • Hematuria   • Osteoarthritis of knee   • Plantar fasciitis   • Routine health maintenance   • Ulcerative pancolitis       Current Outpatient Medications on File Prior to Visit   Medication Sig   • Acetaminophen (TYLENOL ARTHRITIS PAIN PO) Take 1 tablet by mouth 3 (Three) Times a Day. AS NEEDED FOR ARTHRITIS   • albuterol sulfate  (90 Base) MCG/ACT inhaler Inhale 2 puffs Every 4 (Four) Hours As Needed for Wheezing.   • aspirin 81 MG chewable tablet Chew 1 tablet Daily.   • Calcium Carb-Cholecalciferol (CALCIUM PLUS VITAMIN D3 PO) Take 1,400 mg by mouth Daily. D3 = 1500 IU   • cetirizine (ZyrTEC) 10 MG tablet Take 1 tablet by mouth Daily.   • denosumab (PROLIA) 60 MG/ML solution prefilled syringe syringe Inject  under the skin into the appropriate area as directed 1 (One) Time.   • InFLIXimab (REMICADE IV) Infuse 1 dose into a venous  "catheter Every 30 (Thirty) Days. Every 60 days.  Dr. Saldaña.   • levothyroxine (SYNTHROID, LEVOTHROID) 100 MCG tablet Take 1 tablet by mouth Daily.   • Multiple Vitamins-Minerals (MULTIVITAMIN WITH MINERALS) tablet tablet Take 1 tablet by mouth Daily.   • omeprazole (priLOSEC) 20 MG capsule Take 1 capsule by mouth Daily.   • pravastatin (PRAVACHOL) 10 MG tablet Take 1 tablet by mouth Daily.   • Probiotic Product (PROBIOTIC DAILY PO) Take 2 capsules by mouth Daily. OTC Probiotic   • venlafaxine XR (EFFEXOR-XR) 75 MG 24 hr capsule Take 1 capsule by mouth Daily.   • [DISCONTINUED] azithromycin (Zithromax Z-Tres) 250 MG tablet Take 2 tablets by mouth on day 1, then 1 tablet daily on days 2-5     No current facility-administered medications on file prior to visit.          The following portions of the patient's history were reviewed and updated as appropriate: allergies, current medications, past family history, past medical history, past social history, past surgical history and problem list.    Review of Systems   Constitutional: Negative for fever.   HENT: Negative for congestion, postnasal drip and sinus pressure.    Respiratory: Positive for cough, shortness of breath and wheezing.    Cardiovascular: Negative.        Objective   Vitals:    04/27/23 1356   BP: 134/86   BP Location: Left arm   Pulse: 85   Temp: 97.3 °F (36.3 °C)   TempSrc: Infrared   SpO2: 94%   Weight: 82.1 kg (181 lb)   Height: 157.5 cm (62\")       BP Readings from Last 3 Encounters:   04/27/23 134/86   03/23/23 130/88   03/08/23 140/80        Wt Readings from Last 3 Encounters:   04/27/23 82.1 kg (181 lb)   03/23/23 82.6 kg (182 lb 3.2 oz)   03/08/23 80.7 kg (178 lb)        Body mass index is 33.11 kg/m².  Nursing notes and vitals reviewed.    Physical Exam  Vitals and nursing note reviewed.   Constitutional:       General: She is not in acute distress.     Appearance: Normal appearance. She is not ill-appearing.   HENT:      Head: " Normocephalic and atraumatic.      Mouth/Throat:      Mouth: Mucous membranes are moist.   Eyes:      Extraocular Movements: Extraocular movements intact.      Conjunctiva/sclera: Conjunctivae normal.   Cardiovascular:      Rate and Rhythm: Normal rate and regular rhythm.   Pulmonary:      Effort: Pulmonary effort is normal. No respiratory distress.      Breath sounds: Wheezing present. No rales.   Musculoskeletal:      Cervical back: Neck supple. No rigidity.   Skin:     General: Skin is warm and dry.   Neurological:      General: No focal deficit present.      Mental Status: She is alert and oriented to person, place, and time.   Psychiatric:         Mood and Affect: Mood normal.         Behavior: Behavior normal.         No results found for this or any previous visit (from the past 672 hour(s)).      Assessment & Plan   Diagnoses and all orders for this visit:    1. Bronchitis (Primary)  -     doxycycline (VIBRAMYCIN) 100 MG capsule; Take 1 capsule by mouth 2 (Two) Times a Day for 10 days.  Dispense: 20 capsule; Refill: 0  -     Fluticasone-Salmeterol (ADVAIR/WIXELA) 250-50 MCG/ACT DISKUS; Inhale 1 puff 2 (Two) Times a Day.  Dispense: 1 each; Refill: 2      Treat with doxycycline and Advair.  Albuterol prn.  F/U in 3 weeks as scheduled.  Consider PFTs/pulmonology consult.        Medications, including side effects, were discussed with the patient. Patient verbalized understanding.  The plan of care was discussed. All questions were answered. Patient verbalized understanding.      Return if symptoms worsen or fail to improve, for Next scheduled follow up.

## 2023-05-03 ENCOUNTER — HOSPITAL ENCOUNTER (OUTPATIENT)
Dept: INFUSION THERAPY | Facility: HOSPITAL | Age: 73
Discharge: HOME | End: 2023-05-03
Admitting: INTERNAL MEDICINE
Payer: MEDICARE

## 2023-05-03 VITALS
SYSTOLIC BLOOD PRESSURE: 126 MMHG | HEART RATE: 77 BPM | TEMPERATURE: 97.2 F | RESPIRATION RATE: 18 BRPM | DIASTOLIC BLOOD PRESSURE: 84 MMHG | WEIGHT: 179 LBS | OXYGEN SATURATION: 94 % | BODY MASS INDEX: 32.74 KG/M2

## 2023-05-03 DIAGNOSIS — K51.00 ULCERATIVE PANCOLITIS: ICD-10-CM

## 2023-05-03 DIAGNOSIS — K51.00 ULCERATIVE CHRONIC PANCOLITIS WITHOUT COMPLICATIONS: Primary | ICD-10-CM

## 2023-05-03 PROCEDURE — 25010000002 INFLIXIMAB PER 10 MG: Performed by: INTERNAL MEDICINE

## 2023-05-03 PROCEDURE — 96415 CHEMO IV INFUSION ADDL HR: CPT

## 2023-05-03 PROCEDURE — 96413 CHEMO IV INFUSION 1 HR: CPT

## 2023-05-03 RX ORDER — GUAIFENESIN 600 MG/1
1200 TABLET, EXTENDED RELEASE ORAL 2 TIMES DAILY
COMMUNITY

## 2023-05-03 RX ADMIN — INFLIXIMAB 800 MG: 100 INJECTION, POWDER, LYOPHILIZED, FOR SOLUTION INTRAVENOUS at 09:55

## 2023-05-03 NOTE — NURSING NOTE
Patient arrived to [facility] at 0830.  History and medications reviewed with patient.  AVS refused by patient.  Medication administered as ordered without complication.  Patient discharged at 1205 in stable condition witthout complaint.

## 2023-05-10 ENCOUNTER — LAB (OUTPATIENT)
Dept: INTERNAL MEDICINE | Facility: CLINIC | Age: 73
End: 2023-05-10
Payer: MEDICARE

## 2023-05-17 ENCOUNTER — OFFICE VISIT (OUTPATIENT)
Dept: INTERNAL MEDICINE | Facility: CLINIC | Age: 73
End: 2023-05-17
Payer: MEDICARE

## 2023-05-17 VITALS
HEIGHT: 62 IN | HEART RATE: 82 BPM | BODY MASS INDEX: 33.09 KG/M2 | SYSTOLIC BLOOD PRESSURE: 114 MMHG | TEMPERATURE: 98 F | WEIGHT: 179.8 LBS | DIASTOLIC BLOOD PRESSURE: 68 MMHG | OXYGEN SATURATION: 95 %

## 2023-05-17 DIAGNOSIS — M81.0 AGE-RELATED OSTEOPOROSIS WITHOUT CURRENT PATHOLOGICAL FRACTURE: ICD-10-CM

## 2023-05-17 DIAGNOSIS — E55.9 HYPOVITAMINOSIS D: ICD-10-CM

## 2023-05-17 DIAGNOSIS — I45.6 WPW (WOLFF-PARKINSON-WHITE SYNDROME): ICD-10-CM

## 2023-05-17 DIAGNOSIS — E03.9 HYPOTHYROIDISM, UNSPECIFIED TYPE: ICD-10-CM

## 2023-05-17 DIAGNOSIS — Z00.00 ROUTINE HEALTH MAINTENANCE: ICD-10-CM

## 2023-05-17 DIAGNOSIS — M05.79 RHEUMATOID ARTHRITIS INVOLVING MULTIPLE SITES WITH POSITIVE RHEUMATOID FACTOR: ICD-10-CM

## 2023-05-17 DIAGNOSIS — R73.03 PREDIABETES: ICD-10-CM

## 2023-05-17 DIAGNOSIS — K51.00 ULCERATIVE PANCOLITIS: ICD-10-CM

## 2023-05-17 DIAGNOSIS — G45.9 TIA (TRANSIENT ISCHEMIC ATTACK): ICD-10-CM

## 2023-05-17 DIAGNOSIS — F32.A DEPRESSION, UNSPECIFIED DEPRESSION TYPE: ICD-10-CM

## 2023-05-17 DIAGNOSIS — E78.5 HYPERLIPIDEMIA, UNSPECIFIED HYPERLIPIDEMIA TYPE: Primary | ICD-10-CM

## 2023-05-17 NOTE — PROGRESS NOTES
Subjective     Yelena Rangel is a 72 y.o. female, who presents with a chief complaint of   Chief Complaint   Patient presents with   • Follow-up   • Hyperlipidemia   • Hypothyroidism   • Osteoporosis       Hyperlipidemia  Exacerbating diseases include hypothyroidism.   Hypothyroidism  Pertinent negatives include no arthralgias.   Depression  Osteoporosis  Pertinent negatives include no arthralgias.     1. Hyperlipidemia.  Pt is tolerating pravastatin, which we started last time.    2. Ulcerative colitis.  On Remicade every 2 months per Dr. Saldaña.  She has occasional flares.  Colonoscopy pending next week.    3. Depression.  Pt reports she is still doing well with venlafaxine.    4. Rheumatoid arthritis. This is also well-controlled with the Remicade.    The following portions of the patient's history were reviewed and updated as appropriate: allergies, current medications, past family history, past medical history, past social history, past surgical history and problem list.    Allergies: Sulfa antibiotics    Review of Systems   Constitutional: Negative.    HENT: Negative.    Eyes: Negative.    Respiratory: Negative.    Cardiovascular: Negative.    Gastrointestinal: Negative.    Endocrine: Negative.    Genitourinary: Negative.    Musculoskeletal: Negative.  Negative for arthralgias.   Skin: Negative.    Allergic/Immunologic: Positive for environmental allergies.   Neurological: Negative.    Hematological: Negative.    Psychiatric/Behavioral: Negative.        Objective     Wt Readings from Last 3 Encounters:   05/17/23 81.6 kg (179 lb 12.8 oz)   05/03/23 81.2 kg (179 lb)   04/27/23 82.1 kg (181 lb)     Temp Readings from Last 3 Encounters:   05/17/23 98 °F (36.7 °C) (Infrared)   05/03/23 97.2 °F (36.2 °C) (Temporal)   04/27/23 97.3 °F (36.3 °C) (Infrared)     BP Readings from Last 3 Encounters:   05/17/23 114/68   05/03/23 126/84   04/27/23 134/86     Pulse Readings from Last 3 Encounters:   05/17/23 82    05/03/23 77   04/27/23 85     Body mass index is 32.89 kg/m².  SpO2 Readings from Last 3 Encounters:   09/27/17 98%   03/29/17 98%   09/28/16 97%       Physical Exam   Constitutional: She is oriented to person, place, and time. She appears well-developed.   HENT:   Head: Normocephalic and atraumatic.   Mouth/Throat: Mucous membranes are moist.   Eyes: Conjunctivae are normal.   Neck: No thyromegaly present.   Cardiovascular: Normal rate, regular rhythm and normal heart sounds.   Pulmonary/Chest: Effort normal and breath sounds normal. No respiratory distress.   Abdominal: Soft. Normal appearance. There is no abdominal tenderness.   Musculoskeletal:      Right lower leg: No edema.      Left lower leg: No edema.   Neurological: She is alert and oriented to person, place, and time.   Skin: Skin is warm and dry.   Psychiatric: Her behavior is normal. Mood normal.   Nursing note and vitals reviewed.      Results for orders placed or performed in visit on 03/23/23   Urine Culture - Urine, Urine, Clean Catch    Specimen: Urine, Clean Catch    UC   Result Value Ref Range    Urine Culture Final report     Result 1 Comment    POCT urinalysis dipstick, automated    Specimen: Urine   Result Value Ref Range    Color Yellow Yellow, Straw, Dark Yellow, Akiko    Clarity, UA Clear Clear    Specific Gravity  1.020 1.005 - 1.030    pH, Urine 5.0 5.0 - 8.0    Leukocytes Small (1+) (A) Negative    Nitrite, UA Negative Negative    Protein, POC Trace (A) Negative mg/dL    Glucose, UA Negative Negative mg/dL    Ketones, UA Trace (A) Negative    Urobilinogen, UA 0.2 E.U./dL Normal, 0.2 E.U./dL    Bilirubin Negative Negative    Blood, UA Small (A) Negative    Lot Number [id number]     Expiration Date 7/13/24        Assessment/Plan   Diagnoses and all orders for this visit:    1. Hyperlipidemia, unspecified hyperlipidemia type (Primary)  -     Comprehensive Metabolic Panel; Future  -     Lipid Panel With / Chol / HDL Ratio; Future    2.  Hypothyroidism, unspecified type  -     TSH; Future  -     CBC Auto Differential; Future  -     T4, Free; Future    3. Depression, unspecified depression type    4. Age-related osteoporosis without current pathological fracture    5. Ulcerative pancolitis    6. WPW (Liseth-Parkinson-White syndrome)    7. Rheumatoid arthritis involving multiple sites with positive rheumatoid factor    8. TIA (transient ischemic attack)    9. Routine health maintenance    10. Prediabetes  -     Hemoglobin A1c; Future  -     Vitamin B12; Future    11. Hypovitaminosis D  -     Vitamin D,25-Hydroxy; Future    1. Hyperlipidemia.  Improved with pravastatin.  Continue lifestyle measures.  Labs reviewed.    2. Hypothyroidism.  Adequately replaced.  Labs reviewed.    3. Depression.  Controlled.  Continue venlafaxine and lifestyle measures.     4. Osteoporosis.  Continue Prolia.  Dexa scan done 12/2021 showed improvement in bone density.    5. Ulcerative colitis.  Continue per Dr. Saldaña.     6. Liseth-Parkinson-White.  She saw cardiology before her knee replacements.    7. RA.  Controlled with Remicade.    8. TIA.  Continue ASA 81 mg and pravstatin.    9. Routine health maint.  Mammogram UTD 12/2023.  Covid-19 vaccine done.  Had both doses of Shingrix at pharmacy.  Prevnar 20 UTD.    10. Prediabetes.  New diagnosis.  A1c 5.9.  Lifestyle measures.    11. Cough, wheezing.  Much improved but she is still having to using albuterol or cough syrup during the night 4 nights out of 7.  Consider PFTs.      Outpatient Medications Prior to Visit   Medication Sig Dispense Refill   • Acetaminophen (TYLENOL ARTHRITIS PAIN PO) Take 1 tablet by mouth 3 (Three) Times a Day. AS NEEDED FOR ARTHRITIS     • albuterol sulfate  (90 Base) MCG/ACT inhaler Inhale 2 puffs Every 4 (Four) Hours As Needed for Wheezing. 18 g 0   • aspirin 81 MG chewable tablet Chew 1 tablet Daily.     • Calcium Carb-Cholecalciferol (CALCIUM PLUS VITAMIN D3 PO) Take 1,400 mg by  mouth Daily. D3 = 1500 IU     • cetirizine (ZyrTEC) 10 MG tablet Take 1 tablet by mouth Daily.     • denosumab (PROLIA) 60 MG/ML solution prefilled syringe syringe Inject  under the skin into the appropriate area as directed 1 (One) Time.     • Fluticasone-Salmeterol (ADVAIR/WIXELA) 250-50 MCG/ACT DISKUS Inhale 1 puff 2 (Two) Times a Day. 1 each 2   • guaiFENesin (MUCINEX) 600 MG 12 hr tablet Take 2 tablets by mouth 2 (Two) Times a Day.     • InFLIXimab (REMICADE IV) Infuse 1 dose into a venous catheter Every 30 (Thirty) Days. Every 60 days.  Dr. Saldaña.     • levothyroxine (SYNTHROID, LEVOTHROID) 100 MCG tablet Take 1 tablet by mouth Daily. 90 tablet 1   • Multiple Vitamins-Minerals (MULTIVITAMIN WITH MINERALS) tablet tablet Take 1 tablet by mouth Daily.     • omeprazole (priLOSEC) 20 MG capsule Take 1 capsule by mouth Daily. 90 capsule 1   • pravastatin (PRAVACHOL) 10 MG tablet Take 1 tablet by mouth Daily. 90 tablet 1   • Probiotic Product (PROBIOTIC DAILY PO) Take 2 capsules by mouth Daily. OTC Probiotic     • venlafaxine XR (EFFEXOR-XR) 75 MG 24 hr capsule Take 1 capsule by mouth Daily. 90 capsule 1     No facility-administered medications prior to visit.     No orders of the defined types were placed in this encounter.    [unfilled]  There are no discontinued medications.    Return in about 6 months (around 11/17/2023) for Medicare Wellness.

## 2023-05-29 DIAGNOSIS — K21.00 GASTROESOPHAGEAL REFLUX DISEASE WITH ESOPHAGITIS WITHOUT HEMORRHAGE: ICD-10-CM

## 2023-05-30 ENCOUNTER — TELEPHONE (OUTPATIENT)
Dept: INTERNAL MEDICINE | Facility: CLINIC | Age: 73
End: 2023-05-30

## 2023-05-30 DIAGNOSIS — E78.5 HYPERLIPIDEMIA, UNSPECIFIED HYPERLIPIDEMIA TYPE: ICD-10-CM

## 2023-05-30 RX ORDER — VENLAFAXINE HYDROCHLORIDE 75 MG/1
CAPSULE, EXTENDED RELEASE ORAL
Qty: 90 CAPSULE | Refills: 1 | Status: SHIPPED | OUTPATIENT
Start: 2023-05-30

## 2023-05-30 RX ORDER — OMEPRAZOLE 20 MG/1
CAPSULE, DELAYED RELEASE ORAL
Qty: 90 CAPSULE | Refills: 1 | Status: SHIPPED | OUTPATIENT
Start: 2023-05-30

## 2023-05-30 RX ORDER — PRAVASTATIN SODIUM 10 MG
10 TABLET ORAL DAILY
Qty: 90 TABLET | Refills: 1 | Status: SHIPPED | OUTPATIENT
Start: 2023-05-30

## 2023-05-30 NOTE — TELEPHONE ENCOUNTER
Caller: Yelena Rangel    Relationship: Self    Best call back number: [phone]     What was the call regarding: PATIENT CALLING STATING THAT HER COUGH AND WHEEZING ISN'T ANY BETTER SHE IS ALSO EXPERIENCING SHORTNESS OF BREATHE SHE WOULD LIKE TO KNOW IF SHE NEEDS TO BE SEEN IN OFFICE AGAIN OR DOES SHE NEED TO BE REFERRED TO PULMONOLOGIST IF SHE IS REFERRED SHE WOULD LIKE TO SEE SOMEONE IN [place]

## 2023-06-01 ENCOUNTER — TELEPHONE (OUTPATIENT)
Dept: INTERNAL MEDICINE | Facility: CLINIC | Age: 73
End: 2023-06-01

## 2023-06-01 NOTE — TELEPHONE ENCOUNTER
Hub staff attempted to follow warm transfer process and was unsuccessful     Caller: Yelena Rangel    Relationship to patient: Self    Best call back number: [phone]    Patient is needing: PATIENT IS RETURNING CARLOS'S CALL.

## 2023-06-02 ENCOUNTER — HOSPITAL ENCOUNTER (OUTPATIENT)
Dept: INFUSION THERAPY | Facility: HOSPITAL | Age: 73
Discharge: HOME | End: 2023-06-02
Payer: MEDICARE

## 2023-06-02 VITALS
DIASTOLIC BLOOD PRESSURE: 85 MMHG | RESPIRATION RATE: 18 BRPM | SYSTOLIC BLOOD PRESSURE: 137 MMHG | OXYGEN SATURATION: 96 % | TEMPERATURE: 94.9 F | HEART RATE: 90 BPM

## 2023-06-02 DIAGNOSIS — M81.0 AGE-RELATED OSTEOPOROSIS WITHOUT CURRENT PATHOLOGICAL FRACTURE: Primary | ICD-10-CM

## 2023-06-02 LAB
25(OH)D3 SERPL-MCNC: 52.8 NG/ML (ref 30–100)
MAGNESIUM SERPL-MCNC: 1.9 MG/DL (ref 1.6–2.4)
PHOSPHATE SERPL-MCNC: 4.1 MG/DL (ref 2.5–4.5)

## 2023-06-02 PROCEDURE — 83735 ASSAY OF MAGNESIUM: CPT | Performed by: FAMILY MEDICINE

## 2023-06-02 PROCEDURE — 25010000002 DENOSUMAB 60 MG/ML SOLUTION PREFILLED SYRINGE: Performed by: FAMILY MEDICINE

## 2023-06-02 PROCEDURE — 36415 COLL VENOUS BLD VENIPUNCTURE: CPT

## 2023-06-02 PROCEDURE — 84100 ASSAY OF PHOSPHORUS: CPT | Performed by: FAMILY MEDICINE

## 2023-06-02 PROCEDURE — 82306 VITAMIN D 25 HYDROXY: CPT | Performed by: FAMILY MEDICINE

## 2023-06-02 PROCEDURE — 96372 THER/PROPH/DIAG INJ SC/IM: CPT

## 2023-06-02 RX ADMIN — DENOSUMAB 60 MG: 60 INJECTION SUBCUTANEOUS at 13:59

## 2023-06-02 NOTE — PATIENT INSTRUCTIONS
"  Call [facility] at [phone] if you have any problems or concerns.    We know you have a Choice in healthcare and appreciate you using [facility].  Our purpose is to provide you \"Excellent Care\".  We hope that you will always choose us in the future and continue to recommend us to your family and friends.              "

## 2023-06-02 NOTE — NURSING NOTE
Pt arrived for appt in [facility]. VSS, no complaints of pain. Blood drawn via venipuncture. Medication given per MD order. Pt tolerated well. Pt discharged from [facility] at 14:15 PM in stable condition without complaints.

## 2023-06-05 ENCOUNTER — OFFICE VISIT (OUTPATIENT)
Dept: INTERNAL MEDICINE | Facility: CLINIC | Age: 73
End: 2023-06-05
Payer: MEDICARE

## 2023-06-05 VITALS
TEMPERATURE: 97.3 F | OXYGEN SATURATION: 98 % | RESPIRATION RATE: 18 BRPM | WEIGHT: 180 LBS | HEART RATE: 81 BPM | BODY MASS INDEX: 33.13 KG/M2 | DIASTOLIC BLOOD PRESSURE: 78 MMHG | SYSTOLIC BLOOD PRESSURE: 122 MMHG | HEIGHT: 62 IN

## 2023-06-05 DIAGNOSIS — J40 BRONCHITIS: Primary | ICD-10-CM

## 2023-06-05 DIAGNOSIS — R06.2 WHEEZING: ICD-10-CM

## 2023-06-05 DIAGNOSIS — R06.02 SHORTNESS OF BREATH: ICD-10-CM

## 2023-06-05 DIAGNOSIS — E03.9 HYPOTHYROIDISM, UNSPECIFIED TYPE: ICD-10-CM

## 2023-06-05 PROCEDURE — 99214 OFFICE O/P EST MOD 30 MIN: CPT | Performed by: FAMILY MEDICINE

## 2023-06-05 PROCEDURE — 1159F MED LIST DOCD IN RCRD: CPT | Performed by: FAMILY MEDICINE

## 2023-06-05 PROCEDURE — 1160F RVW MEDS BY RX/DR IN RCRD: CPT | Performed by: FAMILY MEDICINE

## 2023-06-05 RX ORDER — MONTELUKAST SODIUM 10 MG/1
10 TABLET ORAL NIGHTLY
Qty: 30 TABLET | Refills: 5 | Status: SHIPPED | OUTPATIENT
Start: 2023-06-05

## 2023-06-05 RX ORDER — FLUTICASONE PROPIONATE AND SALMETEROL 250; 50 UG/1; UG/1
1 POWDER RESPIRATORY (INHALATION)
Qty: 1 EACH | Refills: 2 | Status: SHIPPED | OUTPATIENT
Start: 2023-06-05

## 2023-06-05 RX ORDER — LEVOTHYROXINE SODIUM 0.1 MG/1
100 TABLET ORAL DAILY
Qty: 90 TABLET | Refills: 1 | Status: SHIPPED | OUTPATIENT
Start: 2023-06-05

## 2023-06-05 NOTE — PROGRESS NOTES
Subjective   Yelena Rangel is a 72 y.o. female presenting today for follow up of   Chief Complaint   Patient presents with    Wheezing     Has been ongoing for the last 2 months and has been feeling very fatigued    Cough    Shortness of Breath       History of Present Illness     Yelena reports wheezing, chest congestion, cough (mildly productive), and wheezing over the past week.  She has similar symptoms intermittently over the past 3-4 months.  She has been on two different antibiotics and taken Advair and albuterol MDI.  She currently is not taking the Advair, but albuterol helps with the symptoms.  She also takes cetirizine and has been sneezing.  She denies fevers.  Remote light smoking/second hand smoke exposure 30 years ago. No hx childhood asthma.  No sick contacts.    Patient Active Problem List   Diagnosis    Primary osteoarthritis of knees, bilateral    Depression    Hyperlipidemia    Hypothyroidism    Age-related osteoporosis without current pathological fracture    WPW (Liseth-Parkinson-White syndrome)    Non-specific colitis    Allergic rhinitis    Leukopenia    TIA (transient ischemic attack)    Rheumatoid arthritis involving multiple sites with positive rheumatoid factor    Preop cardiovascular exam    Acute upper respiratory infection    Gastroesophageal reflux disease with esophagitis    GERD (gastroesophageal reflux disease)    Hematuria    Osteoarthritis of knee    Plantar fasciitis    Routine health maintenance    Ulcerative pancolitis    Prediabetes       Current Outpatient Medications on File Prior to Visit   Medication Sig    Acetaminophen (TYLENOL ARTHRITIS PAIN PO) Take 1 tablet by mouth 3 (Three) Times a Day. AS NEEDED FOR ARTHRITIS    albuterol sulfate  (90 Base) MCG/ACT inhaler Inhale 2 puffs Every 4 (Four) Hours As Needed for Wheezing.    aspirin 81 MG chewable tablet Chew 1 tablet Daily.    Calcium Carb-Cholecalciferol (CALCIUM PLUS VITAMIN D3 PO) Take 1,400 mg by mouth Daily.  "D3 = 1500 IU    cetirizine (ZyrTEC) 10 MG tablet Take 1 tablet by mouth Daily.    denosumab (PROLIA) 60 MG/ML solution prefilled syringe syringe Inject  under the skin into the appropriate area as directed 1 (One) Time.    InFLIXimab (REMICADE IV) Infuse 1 dose into a venous catheter Every 30 (Thirty) Days. Every 60 days.  Dr. Saldaña.    Multiple Vitamins-Minerals (MULTIVITAMIN WITH MINERALS) tablet tablet Take 1 tablet by mouth Daily.    omeprazole (priLOSEC) 20 MG capsule TAKE ONE CAPSULE BY MOUTH DAILY    pravastatin (PRAVACHOL) 10 MG tablet Take 1 tablet by mouth Daily.    Probiotic Product (PROBIOTIC DAILY PO) Take 2 capsules by mouth Daily. OTC Probiotic    venlafaxine XR (EFFEXOR-XR) 75 MG 24 hr capsule TAKE ONE CAPSULE BY MOUTH DAILY    [DISCONTINUED] Fluticasone-Salmeterol (ADVAIR/WIXELA) 250-50 MCG/ACT DISKUS Inhale 1 puff 2 (Two) Times a Day.    [DISCONTINUED] levothyroxine (SYNTHROID, LEVOTHROID) 100 MCG tablet Take 1 tablet by mouth Daily.    [DISCONTINUED] guaiFENesin (MUCINEX) 600 MG 12 hr tablet Take 2 tablets by mouth 2 (Two) Times a Day.     No current facility-administered medications on file prior to visit.          The following portions of the patient's history were reviewed and updated as appropriate: allergies, current medications, past family history, past medical history, past social history, past surgical history and problem list.    Review of Systems   Constitutional:  Negative for fever.   HENT:  Positive for sneezing.    Respiratory:  Positive for cough, shortness of breath and wheezing.    Cardiovascular: Negative.    Allergic/Immunologic: Positive for environmental allergies.     Objective   Vitals:    06/05/23 1155   BP: 122/78   BP Location: Left arm   Patient Position: Sitting   Cuff Size: Adult   Pulse: 81   Resp: 18   Temp: 97.3 °F (36.3 °C)   TempSrc: Infrared   SpO2: 98%   Weight: 81.6 kg (180 lb)   Height: 157.5 cm (62\")       BP Readings from Last 3 Encounters:   06/05/23 " 122/78   06/02/23 137/85   05/17/23 114/68        Wt Readings from Last 3 Encounters:   06/05/23 81.6 kg (180 lb)   05/17/23 81.6 kg (179 lb 12.8 oz)   05/03/23 81.2 kg (179 lb)        Body mass index is 32.92 kg/m².  Nursing notes and vitals reviewed.    Physical Exam  Vitals and nursing note reviewed.   Constitutional:       General: She is not in acute distress.     Appearance: Normal appearance.   HENT:      Head: Normocephalic and atraumatic.      Mouth/Throat:      Mouth: Mucous membranes are moist.   Eyes:      Extraocular Movements: Extraocular movements intact.      Conjunctiva/sclera: Conjunctivae normal.   Cardiovascular:      Rate and Rhythm: Normal rate and regular rhythm.   Pulmonary:      Effort: Pulmonary effort is normal. No respiratory distress.      Breath sounds: Wheezing and rhonchi present. No rales.   Musculoskeletal:      Cervical back: Neck supple. No rigidity.      Right lower leg: No edema.      Left lower leg: No edema.   Skin:     General: Skin is warm and dry.   Neurological:      General: No focal deficit present.      Mental Status: She is alert and oriented to person, place, and time.   Psychiatric:         Mood and Affect: Mood normal.         Behavior: Behavior normal.       Recent Results (from the past 672 hour(s))   CBC & Differential    Collection Time: 05/10/23  9:45 AM    Specimen: Blood   Result Value Ref Range    WBC 6.46 3.40 - 10.80 10*3/mm3    RBC 4.15 3.77 - 5.28 10*6/mm3    Hemoglobin 12.9 12.0 - 15.9 g/dL    Hematocrit 39.0 34.0 - 46.6 %    MCV 94.0 79.0 - 97.0 fL    MCH 31.1 26.6 - 33.0 pg    MCHC 33.1 31.5 - 35.7 g/dL    RDW 11.9 (L) 12.3 - 15.4 %    Platelets 347 140 - 450 10*3/mm3    Neutrophil Rel % 35.6 (L) 42.7 - 76.0 %    Lymphocyte Rel % 41.0 19.6 - 45.3 %    Monocyte Rel % 11.1 5.0 - 12.0 %    Eosinophil Rel % 11.1 (H) 0.3 - 6.2 %    Basophil Rel % 0.9 0.0 - 1.5 %    Neutrophils Absolute 2.29 1.70 - 7.00 10*3/mm3    Lymphocytes Absolute 2.65 0.70 - 3.10  10*3/mm3    Monocytes Absolute 0.72 0.10 - 0.90 10*3/mm3    Eosinophils Absolute 0.72 (H) 0.00 - 0.40 10*3/mm3    Basophils Absolute 0.06 0.00 - 0.20 10*3/mm3    Immature Granulocyte Rel % 0.3 0.0 - 0.5 %    Immature Grans Absolute 0.02 0.00 - 0.05 10*3/mm3    nRBC 0.0 0.0 - 0.2 /100 WBC   Comprehensive Metabolic Panel    Collection Time: 05/10/23  9:45 AM    Specimen: Blood   Result Value Ref Range    Glucose 95 65 - 99 mg/dL    BUN 7 (L) 8 - 23 mg/dL    Creatinine 0.75 0.57 - 1.00 mg/dL    EGFR Result 85.2 >60.0 mL/min/1.73    BUN/Creatinine Ratio 9.3 7.0 - 25.0    Sodium 136 136 - 145 mmol/L    Potassium 5.0 3.5 - 5.2 mmol/L    Chloride 96 (L) 98 - 107 mmol/L    Total CO2 25.7 22.0 - 29.0 mmol/L    Calcium 10.2 8.6 - 10.5 mg/dL    Total Protein 7.9 6.0 - 8.5 g/dL    Albumin 4.3 3.5 - 5.2 g/dL    Globulin 3.6 gm/dL    A/G Ratio 1.2 g/dL    Total Bilirubin 0.7 0.0 - 1.2 mg/dL    Alkaline Phosphatase 58 39 - 117 U/L    AST (SGOT) 15 1 - 32 U/L    ALT (SGPT) 14 1 - 33 U/L   Hemoglobin A1c    Collection Time: 05/10/23  9:45 AM    Specimen: Blood   Result Value Ref Range    Hemoglobin A1C 5.90 (H) 4.80 - 5.60 %   Lipid Panel With / Chol / HDL Ratio    Collection Time: 05/10/23  9:45 AM    Specimen: Blood   Result Value Ref Range    Total Cholesterol 231 (H) 0 - 200 mg/dL    Triglycerides 145 0 - 150 mg/dL    HDL Cholesterol 60 40 - 60 mg/dL    VLDL Cholesterol Jose 26 5 - 40 mg/dL    LDL Chol Calc (NIH) 145 (H) 0 - 100 mg/dL    Chol/HDL Ratio 3.85    TSH    Collection Time: 05/10/23  9:45 AM    Specimen: Blood   Result Value Ref Range    TSH 0.269 (L) 0.270 - 4.200 uIU/mL   Vitamin B12    Collection Time: 05/10/23  9:45 AM    Specimen: Blood   Result Value Ref Range    Vitamin B-12 612 211 - 946 pg/mL   T4, Free    Collection Time: 05/10/23  9:45 AM    Specimen: Blood   Result Value Ref Range    Free T4 1.69 0.93 - 1.70 ng/dL   Magnesium    Collection Time: 06/02/23  1:44 PM    Specimen: Blood   Result Value Ref Range     Magnesium 1.9 1.6 - 2.4 mg/dL   Phosphorus    Collection Time: 06/02/23  1:44 PM    Specimen: Blood   Result Value Ref Range    Phosphorus 4.1 2.5 - 4.5 mg/dL   Vitamin D 25 Hydroxy    Collection Time: 06/02/23  1:44 PM    Specimen: Blood   Result Value Ref Range    25 Hydroxy, Vitamin D 52.8 30.0 - 100.0 ng/ml         Assessment & Plan   Diagnoses and all orders for this visit:    1. Bronchitis (Primary)  -     Fluticasone-Salmeterol (ADVAIR/WIXELA) 250-50 MCG/ACT DISKUS; Inhale 1 puff 2 (Two) Times a Day.  Dispense: 1 each; Refill: 2    2. Shortness of breath  -     Spirometry - Pre & Post Bronchodilator with Lung Volumes; Future    3. Wheezing  -     Spirometry - Pre & Post Bronchodilator with Lung Volumes; Future  -     montelukast (Singulair) 10 MG tablet; Take 1 tablet by mouth Every Night.  Dispense: 30 tablet; Refill: 5    4. Hypothyroidism, unspecified type  -     levothyroxine (SYNTHROID, LEVOTHROID) 100 MCG tablet; Take 1 tablet by mouth Daily.  Dispense: 90 tablet; Refill: 1      Restart Advair.  Add montelukast.  Check PFTs.  Last CXR reviewed.  F/U 3 weeks.  Warning s/sxs discussed.      Medications, including side effects, were discussed with the patient. Patient verbalized understanding.  The plan of care was discussed. All questions were answered. Patient verbalized understanding.      Return in about 3 weeks (around 6/26/2023).

## 2023-06-14 ENCOUNTER — HOSPITAL ENCOUNTER (OUTPATIENT)
Dept: PULMONOLOGY | Facility: HOSPITAL | Age: 73
Discharge: HOME | End: 2023-06-14
Admitting: FAMILY MEDICINE
Payer: MEDICARE

## 2023-06-14 DIAGNOSIS — R06.02 SHORTNESS OF BREATH: ICD-10-CM

## 2023-06-14 DIAGNOSIS — R06.2 WHEEZING: ICD-10-CM

## 2023-06-14 PROCEDURE — 94010 BREATHING CAPACITY TEST: CPT

## 2023-06-14 PROCEDURE — 94726 PLETHYSMOGRAPHY LUNG VOLUMES: CPT

## 2023-06-14 PROCEDURE — 94060 EVALUATION OF WHEEZING: CPT

## 2023-06-14 RX ORDER — ALBUTEROL SULFATE 2.5 MG/3ML
2.5 SOLUTION RESPIRATORY (INHALATION) ONCE
Status: COMPLETED | OUTPATIENT
Start: 2023-06-14 | End: 2023-06-14

## 2023-06-14 RX ADMIN — ALBUTEROL SULFATE 2.5 MG: 2.5 SOLUTION RESPIRATORY (INHALATION) at 13:31

## 2023-06-26 PROBLEM — J45.40 MODERATE PERSISTENT ASTHMA: Status: ACTIVE | Noted: 2023-06-26

## 2023-08-23 ENCOUNTER — HOSPITAL ENCOUNTER (OUTPATIENT)
Dept: INFUSION THERAPY | Facility: HOSPITAL | Age: 73
Discharge: HOME | End: 2023-08-23
Payer: MEDICARE

## 2023-08-23 VITALS
RESPIRATION RATE: 18 BRPM | BODY MASS INDEX: 32.96 KG/M2 | TEMPERATURE: 96.6 F | HEART RATE: 74 BPM | SYSTOLIC BLOOD PRESSURE: 115 MMHG | DIASTOLIC BLOOD PRESSURE: 96 MMHG | WEIGHT: 180.2 LBS | OXYGEN SATURATION: 96 %

## 2023-08-23 DIAGNOSIS — K51.00 ULCERATIVE PANCOLITIS: ICD-10-CM

## 2023-08-23 DIAGNOSIS — K51.00 ULCERATIVE CHRONIC PANCOLITIS WITHOUT COMPLICATIONS: Primary | ICD-10-CM

## 2023-08-23 PROCEDURE — 25010000002 INFLIXIMAB PER 10 MG: Performed by: INTERNAL MEDICINE

## 2023-08-23 PROCEDURE — 96413 CHEMO IV INFUSION 1 HR: CPT

## 2023-08-23 PROCEDURE — 96415 CHEMO IV INFUSION ADDL HR: CPT

## 2023-08-23 RX ADMIN — INFLIXIMAB 800 MG: 100 INJECTION, POWDER, LYOPHILIZED, FOR SOLUTION INTRAVENOUS at 08:53

## 2023-08-23 NOTE — NURSING NOTE
0830  Pt to [facility] ambulatory for Remicade infusion.  Pt took her own premeds of Tylenol and Zyrtec prior to arrival today.  1100  Pt discharged ambulatory with next appt made; Pt uses [facility]; does not want paperwork today.  Pt denies any adverse reactions to infusion given today; VSS.

## 2023-10-18 ENCOUNTER — HOSPITAL ENCOUNTER (OUTPATIENT)
Dept: INFUSION THERAPY | Facility: HOSPITAL | Age: 73
Discharge: HOME | End: 2023-10-18
Admitting: INTERNAL MEDICINE
Payer: MEDICARE

## 2023-10-18 VITALS
SYSTOLIC BLOOD PRESSURE: 147 MMHG | HEART RATE: 74 BPM | RESPIRATION RATE: 18 BRPM | OXYGEN SATURATION: 94 % | WEIGHT: 178 LBS | TEMPERATURE: 96.8 F | BODY MASS INDEX: 32.56 KG/M2 | DIASTOLIC BLOOD PRESSURE: 77 MMHG

## 2023-10-18 DIAGNOSIS — K51.00 ULCERATIVE PANCOLITIS: ICD-10-CM

## 2023-10-18 DIAGNOSIS — K51.00 ULCERATIVE CHRONIC PANCOLITIS WITHOUT COMPLICATIONS: Primary | ICD-10-CM

## 2023-10-18 PROCEDURE — 96413 CHEMO IV INFUSION 1 HR: CPT

## 2023-10-18 PROCEDURE — 96415 CHEMO IV INFUSION ADDL HR: CPT

## 2023-10-18 PROCEDURE — 25010000002 INFLIXIMAB PER 10 MG: Performed by: INTERNAL MEDICINE

## 2023-10-18 PROCEDURE — 25810000003 SODIUM CHLORIDE 0.9 % SOLUTION 250 ML FLEX CONT: Performed by: INTERNAL MEDICINE

## 2023-10-18 RX ADMIN — INFLIXIMAB 800 MG: 100 INJECTION, POWDER, LYOPHILIZED, FOR SOLUTION INTRAVENOUS at 09:09

## 2023-10-18 NOTE — NURSING NOTE
Pt arrived to [facility] for appt. VSS, no complaints at this time. Medication administered per MD order. Pt tolerated well. VSS post infusion. Pt discharged from [facility] at 11:35 AM in stable condition, without complaints.

## 2023-11-22 ENCOUNTER — OFFICE VISIT (OUTPATIENT)
Dept: INTERNAL MEDICINE | Facility: CLINIC | Age: 73
End: 2023-11-22
Payer: MEDICARE

## 2023-11-22 ENCOUNTER — LAB (OUTPATIENT)
Dept: LAB | Facility: HOSPITAL | Age: 73
End: 2023-11-22
Payer: MEDICARE

## 2023-11-22 VITALS
HEART RATE: 82 BPM | SYSTOLIC BLOOD PRESSURE: 124 MMHG | WEIGHT: 178.3 LBS | TEMPERATURE: 98 F | BODY MASS INDEX: 32.81 KG/M2 | OXYGEN SATURATION: 97 % | HEIGHT: 62 IN | DIASTOLIC BLOOD PRESSURE: 82 MMHG

## 2023-11-22 DIAGNOSIS — Z12.31 ENCOUNTER FOR SCREENING MAMMOGRAM FOR MALIGNANT NEOPLASM OF BREAST: ICD-10-CM

## 2023-11-22 DIAGNOSIS — K51.00 ULCERATIVE PANCOLITIS: ICD-10-CM

## 2023-11-22 DIAGNOSIS — R73.03 PREDIABETES: ICD-10-CM

## 2023-11-22 DIAGNOSIS — E87.1 HYPONATREMIA: ICD-10-CM

## 2023-11-22 DIAGNOSIS — D64.9 ANEMIA, UNSPECIFIED TYPE: ICD-10-CM

## 2023-11-22 DIAGNOSIS — Z23 NEED FOR VACCINATION: ICD-10-CM

## 2023-11-22 DIAGNOSIS — E03.9 HYPOTHYROIDISM, UNSPECIFIED TYPE: ICD-10-CM

## 2023-11-22 DIAGNOSIS — Z00.00 ROUTINE HEALTH MAINTENANCE: ICD-10-CM

## 2023-11-22 DIAGNOSIS — M81.0 AGE-RELATED OSTEOPOROSIS WITHOUT CURRENT PATHOLOGICAL FRACTURE: ICD-10-CM

## 2023-11-22 DIAGNOSIS — E78.5 HYPERLIPIDEMIA, UNSPECIFIED HYPERLIPIDEMIA TYPE: Primary | ICD-10-CM

## 2023-11-22 DIAGNOSIS — I45.6 WPW (WOLFF-PARKINSON-WHITE SYNDROME): ICD-10-CM

## 2023-11-22 DIAGNOSIS — G45.9 TIA (TRANSIENT ISCHEMIC ATTACK): ICD-10-CM

## 2023-11-22 DIAGNOSIS — F32.A DEPRESSION, UNSPECIFIED DEPRESSION TYPE: ICD-10-CM

## 2023-11-22 DIAGNOSIS — M05.79 RHEUMATOID ARTHRITIS INVOLVING MULTIPLE SITES WITH POSITIVE RHEUMATOID FACTOR: ICD-10-CM

## 2023-11-22 LAB
25(OH)D3 SERPL-MCNC: 56.6 NG/ML (ref 30–100)
ALBUMIN SERPL-MCNC: 3.6 G/DL (ref 3.5–5.2)
ALBUMIN/GLOB SERPL: 0.6 G/DL
ALP SERPL-CCNC: 78 U/L (ref 39–117)
ALT SERPL W P-5'-P-CCNC: 9 U/L (ref 1–33)
ANION GAP SERPL CALCULATED.3IONS-SCNC: 13.1 MMOL/L (ref 5–15)
AST SERPL-CCNC: 13 U/L (ref 1–32)
BASOPHILS # BLD AUTO: 0.05 10*3/MM3 (ref 0–0.2)
BASOPHILS NFR BLD AUTO: 0.5 % (ref 0–1.5)
BILIRUB SERPL-MCNC: 0.4 MG/DL (ref 0–1.2)
BUN SERPL-MCNC: 8 MG/DL (ref 8–23)
BUN/CREAT SERPL: 13.6 (ref 7–25)
CALCIUM SPEC-SCNC: 9.9 MG/DL (ref 8.6–10.5)
CHLORIDE SERPL-SCNC: 93 MMOL/L (ref 98–107)
CO2 SERPL-SCNC: 23.9 MMOL/L (ref 22–29)
CREAT SERPL-MCNC: 0.59 MG/DL (ref 0.57–1)
DEPRECATED RDW RBC AUTO: 38.2 FL (ref 37–54)
EGFRCR SERPLBLD CKD-EPI 2021: 95.9 ML/MIN/1.73
EOSINOPHIL # BLD AUTO: 0.17 10*3/MM3 (ref 0–0.4)
EOSINOPHIL NFR BLD AUTO: 1.7 % (ref 0.3–6.2)
ERYTHROCYTE [DISTWIDTH] IN BLOOD BY AUTOMATED COUNT: 12.1 % (ref 12.3–15.4)
FERRITIN SERPL-MCNC: 321 NG/ML (ref 13–150)
GLOBULIN UR ELPH-MCNC: 5.8 GM/DL
GLUCOSE SERPL-MCNC: 97 MG/DL (ref 65–99)
HCT VFR BLD AUTO: 34 % (ref 34–46.6)
HGB BLD-MCNC: 11.4 G/DL (ref 12–15.9)
IMM GRANULOCYTES # BLD AUTO: 0.04 10*3/MM3 (ref 0–0.05)
IMM GRANULOCYTES NFR BLD AUTO: 0.4 % (ref 0–0.5)
IRON 24H UR-MRATE: 27 MCG/DL (ref 37–145)
IRON SATN MFR SERPL: 9 % (ref 20–50)
LYMPHOCYTES # BLD AUTO: 2.65 10*3/MM3 (ref 0.7–3.1)
LYMPHOCYTES NFR BLD AUTO: 26.4 % (ref 19.6–45.3)
MAGNESIUM SERPL-MCNC: 1.9 MG/DL (ref 1.6–2.4)
MCH RBC QN AUTO: 29.3 PG (ref 26.6–33)
MCHC RBC AUTO-ENTMCNC: 33.5 G/DL (ref 31.5–35.7)
MCV RBC AUTO: 87.4 FL (ref 79–97)
MONOCYTES # BLD AUTO: 0.97 10*3/MM3 (ref 0.1–0.9)
MONOCYTES NFR BLD AUTO: 9.7 % (ref 5–12)
NEUTROPHILS NFR BLD AUTO: 6.15 10*3/MM3 (ref 1.7–7)
NEUTROPHILS NFR BLD AUTO: 61.3 % (ref 42.7–76)
NRBC BLD AUTO-RTO: 0 /100 WBC (ref 0–0.2)
PHOSPHATE SERPL-MCNC: 4.3 MG/DL (ref 2.5–4.5)
PLATELET # BLD AUTO: 555 10*3/MM3 (ref 140–450)
PMV BLD AUTO: 10.3 FL (ref 6–12)
POTASSIUM SERPL-SCNC: 4.1 MMOL/L (ref 3.5–5.2)
PROT SERPL-MCNC: 9.4 G/DL (ref 6–8.5)
RBC # BLD AUTO: 3.89 10*6/MM3 (ref 3.77–5.28)
SODIUM SERPL-SCNC: 130 MMOL/L (ref 136–145)
TIBC SERPL-MCNC: 314 MCG/DL (ref 298–536)
TRANSFERRIN SERPL-MCNC: 211 MG/DL (ref 200–360)
WBC NRBC COR # BLD AUTO: 10.03 10*3/MM3 (ref 3.4–10.8)

## 2023-11-22 PROCEDURE — 85025 COMPLETE CBC W/AUTO DIFF WBC: CPT | Performed by: FAMILY MEDICINE

## 2023-11-22 PROCEDURE — 82306 VITAMIN D 25 HYDROXY: CPT

## 2023-11-22 PROCEDURE — 80053 COMPREHEN METABOLIC PANEL: CPT

## 2023-11-22 PROCEDURE — 36415 COLL VENOUS BLD VENIPUNCTURE: CPT | Performed by: FAMILY MEDICINE

## 2023-11-22 PROCEDURE — 83540 ASSAY OF IRON: CPT | Performed by: FAMILY MEDICINE

## 2023-11-22 PROCEDURE — 83735 ASSAY OF MAGNESIUM: CPT

## 2023-11-22 PROCEDURE — 84466 ASSAY OF TRANSFERRIN: CPT | Performed by: FAMILY MEDICINE

## 2023-11-22 PROCEDURE — 84100 ASSAY OF PHOSPHORUS: CPT

## 2023-11-22 PROCEDURE — 82728 ASSAY OF FERRITIN: CPT | Performed by: FAMILY MEDICINE

## 2023-11-22 NOTE — PROGRESS NOTES
Subjective     Yelena Rangel is a 73 y.o. female, who presents with a chief complaint of   Chief Complaint   Patient presents with    Hypothyroidism    Hyperlipidemia       Hyperlipidemia  Exacerbating diseases include hypothyroidism.   Hypothyroidism  Pertinent negatives include no arthralgias.   Osteoporosis  Pertinent negatives include no arthralgias.   Depression    1. Hyperlipidemia.  Pt is now having shoulder and hip pain and back pain, similar to what she experienced with rosuvastatin in the past.    2. Ulcerative colitis.  On Remicade every 2 months per Dr. Saldaña.  She has occasional flares.  Colonoscopy done 12/2022.    3. Depression.  Pt reports she is still doing well with venlafaxine.    4. Rheumatoid arthritis. This has been well-controlled with the Remicade, but her shoulders and hips have been hurting past week.    The following portions of the patient's history were reviewed and updated as appropriate: allergies, current medications, past family history, past medical history, past social history, past surgical history and problem list.    Allergies: Sulfa antibiotics    Review of Systems   Constitutional: Negative.    HENT: Negative.     Eyes: Negative.    Respiratory: Negative.     Cardiovascular: Negative.    Gastrointestinal: Negative.    Endocrine: Negative.    Genitourinary: Negative.    Musculoskeletal: Negative.  Negative for arthralgias.   Skin: Negative.    Allergic/Immunologic: Positive for environmental allergies.   Neurological: Negative.    Hematological: Negative.    Psychiatric/Behavioral: Negative.         Objective     Wt Readings from Last 3 Encounters:   11/22/23 80.9 kg (178 lb 4.8 oz)   10/18/23 80.7 kg (178 lb)   08/23/23 81.7 kg (180 lb 3.2 oz)     Temp Readings from Last 3 Encounters:   11/22/23 98 °F (36.7 °C) (Infrared)   10/18/23 96.8 °F (36 °C)   08/23/23 96.6 °F (35.9 °C) (Temporal)     BP Readings from Last 3 Encounters:   11/22/23 124/82   10/18/23 147/77    08/23/23 115/96     Pulse Readings from Last 3 Encounters:   11/22/23 82   10/18/23 74   08/23/23 74     Body mass index is 32.6 kg/m².  SpO2 Readings from Last 3 Encounters:   09/27/17 98%   03/29/17 98%   09/28/16 97%       Physical Exam   Constitutional: She is oriented to person, place, and time. She appears well-developed.   HENT:   Head: Normocephalic and atraumatic.   Mouth/Throat: Mucous membranes are moist.   Eyes: Conjunctivae are normal.   Neck: No thyromegaly present.   Cardiovascular: Normal rate, regular rhythm and normal heart sounds.   Pulmonary/Chest: Effort normal and breath sounds normal. No respiratory distress.   Abdominal: Soft. Normal appearance. There is no abdominal tenderness.   Musculoskeletal:      Right lower leg: No edema.      Left lower leg: No edema.   Neurological: She is alert and oriented to person, place, and time.   Skin: Skin is warm and dry.   Psychiatric: Her behavior is normal. Mood normal.   Nursing note and vitals reviewed.      Results for orders placed or performed during the hospital encounter of 06/02/23   Magnesium    Specimen: Blood   Result Value Ref Range    Magnesium 1.9 1.6 - 2.4 mg/dL   Phosphorus    Specimen: Blood   Result Value Ref Range    Phosphorus 4.1 2.5 - 4.5 mg/dL   Vitamin D 25 Hydroxy    Specimen: Blood   Result Value Ref Range    25 Hydroxy, Vitamin D 52.8 30.0 - 100.0 ng/ml       Assessment/Plan   Diagnoses and all orders for this visit:    1. Hyperlipidemia, unspecified hyperlipidemia type (Primary)    2. Hypothyroidism, unspecified type    3. Depression, unspecified depression type    4. Age-related osteoporosis without current pathological fracture  -     DEXA Bone Density Axial; Future    5. Ulcerative pancolitis    6. WPW (Liseth-Parkinson-White syndrome)    7. Rheumatoid arthritis involving multiple sites with positive rheumatoid factor    8. TIA (transient ischemic attack)    9. Prediabetes    10. Routine health maintenance    11. Encounter  for screening mammogram for malignant neoplasm of breast  -     Mammo screening digital tomosynthesis bilateral w CAD; Future    12. Need for vaccination  -     Fluzone High-Dose 65+yrs ([id number])    13. Hyponatremia  -     Basic metabolic panel    14. Anemia, unspecified type  -     CBC & Differential  -     Ferritin  -     Iron Profile    1. Hyperlipidemia.  Not tolerating pravastatin.  Hold for now and continue lifestyle measures.    2. Hypothyroidism.  Adequately replaced.  Labs reviewed.    3. Depression.  Controlled.  Continue venlafaxine and lifestyle measures.     4. Osteoporosis.  Continue Prolia.  Dexa scan done 12/2021 showed improvement in bone density.  Next Dexa ordered.    5. Ulcerative colitis.  Continue per Dr. Saldaña.  On Remicade.    6. Liseth-Parkinson-White.  She saw cardiology before her knee replacements.    7. RA.  Has been controlled with Remicade.  Having a mild flare but she declines prednisone, etc.  Can't take NSAIDs.    8. TIA.  Continue ASA 81 mg and pravstatin.    9. Prediabetes.  New diagnosis last time.  A1c 6.1, up from 5.9.  Lifestyle measures.    10. Routine health maint.  Mammogram done 12/2023 and is reordered.  Covid-19 vaccine and RSV vaccines declined.  Had both doses of Shingrix at pharmacy.  Prevnar 20 UTD.  Flu vaccine today.    11.  Hyponatremia.  Recheck labs today.   Recheck CBC as well due to mild anemia.      Outpatient Medications Prior to Visit   Medication Sig Dispense Refill    Acetaminophen (TYLENOL ARTHRITIS PAIN PO) Take 1 tablet by mouth 3 (Three) Times a Day. AS NEEDED FOR ARTHRITIS      albuterol sulfate  (90 Base) MCG/ACT inhaler Inhale 2 puffs Every 4 (Four) Hours As Needed for Wheezing. 18 g 1    aspirin 81 MG chewable tablet Chew 1 tablet Daily.      Calcium Carb-Cholecalciferol (CALCIUM PLUS VITAMIN D3 PO) Take 1,400 mg by mouth Daily. D3 = 1500 IU      cetirizine (ZyrTEC) 10 MG tablet Take 1 tablet by mouth Daily.      denosumab (PROLIA) 60  MG/ML solution prefilled syringe syringe Inject  under the skin into the appropriate area as directed 1 (One) Time.      Fluticasone-Salmeterol (ADVAIR/WIXELA) 250-50 MCG/ACT DISKUS Inhale 1 puff 2 (Two) Times a Day. 3 each 1    InFLIXimab (REMICADE IV) Infuse 1 dose into a venous catheter Every 30 (Thirty) Days. Every 60 days.  Dr. Saldaña.      levothyroxine (SYNTHROID, LEVOTHROID) 100 MCG tablet Take 1 tablet by mouth Daily. 90 tablet 1    montelukast (Singulair) 10 MG tablet Take 1 tablet by mouth Every Night. 90 tablet 1    Multiple Vitamins-Minerals (MULTIVITAMIN WITH MINERALS) tablet tablet Take 1 tablet by mouth Daily.      omeprazole (priLOSEC) 20 MG capsule TAKE ONE CAPSULE BY MOUTH DAILY 90 capsule 1    pravastatin (PRAVACHOL) 10 MG tablet Take 1 tablet by mouth Daily. 90 tablet 1    Probiotic Product (PROBIOTIC DAILY PO) Take 2 capsules by mouth Daily. OTC Probiotic      venlafaxine XR (EFFEXOR-XR) 75 MG 24 hr capsule TAKE ONE CAPSULE BY MOUTH DAILY 90 capsule 1     No facility-administered medications prior to visit.     No orders of the defined types were placed in this encounter.    [unfilled]  There are no discontinued medications.    Return in about 6 months (around 5/22/2024).

## 2023-11-27 DIAGNOSIS — D64.9 ANEMIA, UNSPECIFIED TYPE: Primary | ICD-10-CM

## 2023-11-27 DIAGNOSIS — E87.1 HYPONATREMIA: ICD-10-CM

## 2023-11-27 RX ORDER — VENLAFAXINE HYDROCHLORIDE 37.5 MG/1
37.5 CAPSULE, EXTENDED RELEASE ORAL DAILY
Qty: 30 CAPSULE | Refills: 2 | Status: SHIPPED | OUTPATIENT
Start: 2023-11-27

## 2023-11-30 DIAGNOSIS — K21.00 GASTROESOPHAGEAL REFLUX DISEASE WITH ESOPHAGITIS WITHOUT HEMORRHAGE: ICD-10-CM

## 2023-12-04 DIAGNOSIS — E03.9 HYPOTHYROIDISM, UNSPECIFIED TYPE: ICD-10-CM

## 2023-12-04 DIAGNOSIS — K21.00 GASTROESOPHAGEAL REFLUX DISEASE WITH ESOPHAGITIS WITHOUT HEMORRHAGE: ICD-10-CM

## 2023-12-04 RX ORDER — OMEPRAZOLE 20 MG/1
20 CAPSULE, DELAYED RELEASE ORAL DAILY
Qty: 90 CAPSULE | Refills: 1 | Status: CANCELLED | OUTPATIENT
Start: 2023-12-04

## 2023-12-04 RX ORDER — OMEPRAZOLE 20 MG/1
20 CAPSULE, DELAYED RELEASE ORAL DAILY
Qty: 90 CAPSULE | Refills: 1 | Status: SHIPPED | OUTPATIENT
Start: 2023-12-04

## 2023-12-04 NOTE — TELEPHONE ENCOUNTER
Caller: Yelena Rangel    Relationship: Self    Best call back number: [phone]    Requested Prescriptions:   Requested Prescriptions     Pending Prescriptions Disp Refills    levothyroxine (SYNTHROID, LEVOTHROID) 100 MCG tablet 90 tablet 1     Sig: Take 1 tablet by mouth Daily.    omeprazole (priLOSEC) 20 MG capsule 90 capsule 1     Sig: Take 1 capsule by mouth Daily.        Pharmacy where request should be sent: [facility] [id number] [place], [place] - [address] - [phone]  - [phone] FX     Last office visit with prescribing clinician: 11/22/2023   Last telemedicine visit with prescribing clinician: Visit date not found   Next office visit with prescribing clinician: 5/22/2024     Additional details provided by patient: HAS 2 PILLS     Does the patient have less than a 3 day supply:  [x] Yes  [] No    Would you like a call back once the refill request has been completed: [] Yes [] No    If the office needs to give you a call back, can they leave a voicemail: [] Yes [] No    Bill Ruiz Rep   12/04/23 10:02 EST

## 2023-12-06 RX ORDER — LEVOTHYROXINE SODIUM 0.1 MG/1
100 TABLET ORAL DAILY
Qty: 90 TABLET | Refills: 1 | Status: SHIPPED | OUTPATIENT
Start: 2023-12-06

## 2023-12-13 ENCOUNTER — HOSPITAL ENCOUNTER (OUTPATIENT)
Dept: INFUSION THERAPY | Facility: HOSPITAL | Age: 73
Discharge: HOME | End: 2023-12-13
Admitting: INTERNAL MEDICINE
Payer: MEDICARE

## 2023-12-13 VITALS
HEART RATE: 82 BPM | DIASTOLIC BLOOD PRESSURE: 76 MMHG | SYSTOLIC BLOOD PRESSURE: 124 MMHG | OXYGEN SATURATION: 94 % | WEIGHT: 176.8 LBS | TEMPERATURE: 96 F | BODY MASS INDEX: 32.33 KG/M2 | RESPIRATION RATE: 18 BRPM

## 2023-12-13 DIAGNOSIS — K51.00 ULCERATIVE PANCOLITIS: ICD-10-CM

## 2023-12-13 DIAGNOSIS — K51.00 ULCERATIVE CHRONIC PANCOLITIS WITHOUT COMPLICATIONS: Primary | ICD-10-CM

## 2023-12-13 PROCEDURE — 96415 CHEMO IV INFUSION ADDL HR: CPT

## 2023-12-13 PROCEDURE — 25010000002 INFLIXIMAB PER 10 MG: Performed by: INTERNAL MEDICINE

## 2023-12-13 PROCEDURE — 25810000003 SODIUM CHLORIDE 0.9 % SOLUTION 250 ML FLEX CONT: Performed by: INTERNAL MEDICINE

## 2023-12-13 PROCEDURE — 96413 CHEMO IV INFUSION 1 HR: CPT

## 2023-12-13 RX ADMIN — INFLIXIMAB 800 MG: 100 INJECTION, POWDER, LYOPHILIZED, FOR SOLUTION INTRAVENOUS at 08:41

## 2023-12-13 NOTE — NURSING NOTE
Pt arrived to [facility] for appt. VSS, no complaints at this time. Medication administered per MD order. VSS post infusion. Pt tolerated well. Scheduled next appt. Offered AVS, pt declined. Pt discharged from [facility] at 11:00 AM in stable condition, without complaints.

## 2023-12-13 NOTE — PATIENT INSTRUCTIONS
"  Call Dr. Dl Saldaña, Gastroenterology at [phone] if you have any problems or concerns.    We know you have a Choice in healthcare and appreciate you using [facility].  Our purpose is to provide you \"Excellent Care\".  We hope that you will always choose us in the future and continue to recommend us to your family and friends.              "

## 2023-12-18 DIAGNOSIS — D75.839 THROMBOCYTOSIS: Primary | ICD-10-CM

## 2023-12-18 DIAGNOSIS — E87.5 SERUM POTASSIUM ELEVATED: ICD-10-CM

## 2023-12-20 ENCOUNTER — OFFICE VISIT (OUTPATIENT)
Dept: GASTROENTEROLOGY | Facility: CLINIC | Age: 73
End: 2023-12-20
Payer: MEDICARE

## 2023-12-20 ENCOUNTER — LAB (OUTPATIENT)
Dept: LAB | Facility: HOSPITAL | Age: 73
End: 2023-12-20
Payer: MEDICARE

## 2023-12-20 VITALS
WEIGHT: 177.8 LBS | BODY MASS INDEX: 32.72 KG/M2 | SYSTOLIC BLOOD PRESSURE: 120 MMHG | HEIGHT: 62 IN | DIASTOLIC BLOOD PRESSURE: 80 MMHG

## 2023-12-20 DIAGNOSIS — Z13.6 ENCOUNTER FOR SCREENING FOR CARDIOVASCULAR DISORDERS: ICD-10-CM

## 2023-12-20 DIAGNOSIS — K51.00 ULCERATIVE PANCOLITIS: ICD-10-CM

## 2023-12-20 DIAGNOSIS — K21.00 GASTROESOPHAGEAL REFLUX DISEASE WITH ESOPHAGITIS, UNSPECIFIED WHETHER HEMORRHAGE: ICD-10-CM

## 2023-12-20 DIAGNOSIS — K51.00 ULCERATIVE PANCOLITIS WITHOUT COMPLICATION: Primary | ICD-10-CM

## 2023-12-20 DIAGNOSIS — K51.00 ULCERATIVE PANCOLITIS WITHOUT COMPLICATION: ICD-10-CM

## 2023-12-20 LAB
BASOPHILS # BLD AUTO: 0.06 10*3/MM3 (ref 0–0.2)
BASOPHILS NFR BLD AUTO: 0.6 % (ref 0–1.5)
CRP SERPL-MCNC: 2.67 MG/DL (ref 0.01–0.5)
DEPRECATED RDW RBC AUTO: 41 FL (ref 37–54)
EOSINOPHIL # BLD AUTO: 0.34 10*3/MM3 (ref 0–0.4)
EOSINOPHIL NFR BLD AUTO: 3.5 % (ref 0.3–6.2)
ERYTHROCYTE [DISTWIDTH] IN BLOOD BY AUTOMATED COUNT: 12.7 % (ref 12.3–15.4)
HCT VFR BLD AUTO: 34.6 % (ref 34–46.6)
HGB BLD-MCNC: 11.2 G/DL (ref 12–15.9)
IMM GRANULOCYTES # BLD AUTO: 0.07 10*3/MM3 (ref 0–0.05)
IMM GRANULOCYTES NFR BLD AUTO: 0.7 % (ref 0–0.5)
LYMPHOCYTES # BLD AUTO: 3.55 10*3/MM3 (ref 0.7–3.1)
LYMPHOCYTES NFR BLD AUTO: 36.4 % (ref 19.6–45.3)
MCH RBC QN AUTO: 28.5 PG (ref 26.6–33)
MCHC RBC AUTO-ENTMCNC: 32.4 G/DL (ref 31.5–35.7)
MCV RBC AUTO: 88 FL (ref 79–97)
MONOCYTES # BLD AUTO: 1.05 10*3/MM3 (ref 0.1–0.9)
MONOCYTES NFR BLD AUTO: 10.8 % (ref 5–12)
NEUTROPHILS NFR BLD AUTO: 4.68 10*3/MM3 (ref 1.7–7)
NEUTROPHILS NFR BLD AUTO: 48 % (ref 42.7–76)
NRBC BLD AUTO-RTO: 0 /100 WBC (ref 0–0.2)
PLATELET # BLD AUTO: 568 10*3/MM3 (ref 140–450)
PMV BLD AUTO: 9.9 FL (ref 6–12)
RBC # BLD AUTO: 3.93 10*6/MM3 (ref 3.77–5.28)
RETICS # AUTO: 0.07 10*6/MM3 (ref 0.02–0.13)
RETICS/RBC NFR AUTO: 1.84 % (ref 0.7–1.9)
WBC NRBC COR # BLD AUTO: 9.75 10*3/MM3 (ref 3.4–10.8)

## 2023-12-20 PROCEDURE — 99213 OFFICE O/P EST LOW 20 MIN: CPT | Performed by: INTERNAL MEDICINE

## 2023-12-20 PROCEDURE — 1160F RVW MEDS BY RX/DR IN RCRD: CPT | Performed by: INTERNAL MEDICINE

## 2023-12-20 PROCEDURE — 85045 AUTOMATED RETICULOCYTE COUNT: CPT

## 2023-12-20 PROCEDURE — 36415 COLL VENOUS BLD VENIPUNCTURE: CPT

## 2023-12-20 PROCEDURE — 86141 C-REACTIVE PROTEIN HS: CPT

## 2023-12-20 PROCEDURE — 83993 ASSAY FOR CALPROTECTIN FECAL: CPT | Performed by: INTERNAL MEDICINE

## 2023-12-20 PROCEDURE — 1159F MED LIST DOCD IN RCRD: CPT | Performed by: INTERNAL MEDICINE

## 2023-12-20 PROCEDURE — 85025 COMPLETE CBC W/AUTO DIFF WBC: CPT | Performed by: INTERNAL MEDICINE

## 2023-12-20 NOTE — PROGRESS NOTES
PATIENT INFORMATION  Yelena Rangel       - 1950    CHIEF COMPLAINT  Chief Complaint   Patient presents with    Ulcerative pancolitis    Heartburn    Osteoporosis       HISTORY OF PRESENT ILLNESS  Had a rash reaction and had her Statin stopped and is already better form that and her Plts are increased to 500k    No gi Symptoms but her arhtritis has been worse as well but that didn't get better off the statin    Discussed the proposed inflammatory process htat has her BM stimulated and the proposed w/u        REVIEWED PERTINENT RESULTS/ LABS  Lab Results   Component Value Date    CASEREPORT  2022     Surgical Pathology Report                         Case: [id number]                                  Authorizing Provider:  Dl Saldaña        Collected:           2022 02:29 PM                                 MD Ivana                                                                   Ordering Location:     [facility]   Received:            2022 03:24 PM                                 OR                                                                           Pathologist:           Naveen Peraza MD                                                         Specimens:   1) - Large Intestine, Right / Ascending Colon, biopsies                                             2) - Large Intestine, Transverse Colon, biopsies                                                    3) - Large Intestine, Sigmoid Colon, biopsies                                                       4) - Large Intestine, Sigmoid Colon, polyp at 20 cm                                                 5) - Large Intestine, Rectum, biopsies                                                     FINALDX  2022     1. Colon, Right Ascending, Biopsy:  Benign colonic mucosa with   A. No hyperplastic or tubulovillous change.   B. No significant inflammation.   C. No crypt distortion or basement membrane  thickening.   D. No viral inclusions or other organisms on routinely stained sections.   E. Prominent lymphoid aggregates.    2. Colon, Transverse, Biopsy:  Benign colonic mucosa with   A. No hyperplastic or tubulovillous change.   B. No significant inflammation.   C. No crypt distortion or basement membrane thickening.   D. No viral inclusions or other organisms on routinely stained sections.   E. Prominent lymphoid aggregates.    3. Colon, Sigmoid, Biopsy:  Benign colonic mucosa with   A. No significant inflammation.   B. No cryptitis or crypt abscess.   C. No dysplasia.   D. Minimal distortion of the crypt architecture.    4. Colon, Sigmoid at 20 cm, Biopsy:  Benign colonic mucosa with   A. Focal erosion.   B. Acute inflammation of the lamina propria.   C. Granulation like tissue.   D. Hyperplastic changes.   E. No dysplasia.    5. Rectum, Biopsy:  Benign colonic mucosa with   A. No hyperplastic or tubulovillous change.   B. No significant inflammation.   C. No crypt distortion or basement membrane thickening.   D. No viral inclusions or other organisms on routinely stained sections.    LYNDSAY/clm       Lab Results   Component Value Date    HGB 11.2 (L) 12/15/2023    MCV 88.2 12/15/2023     (H) 12/15/2023    ALT 9 11/22/2023    AST 13 11/22/2023    HGBA1C 6.10 (H) 11/15/2023    INR 1.08 03/10/2020    TRIG 108 11/15/2023    FERRITIN 272.00 (H) 12/15/2023    IRON 27 (L) 11/22/2023    TIBC 302 12/15/2023      No results found.    REVIEW OF SYSTEMS  Review of Systems   Constitutional:  Negative for activity change, chills, fever and unexpected weight change.   HENT:  Negative for congestion.    Eyes:  Negative for visual disturbance.   Respiratory:  Negative for shortness of breath.    Cardiovascular:  Negative for chest pain and palpitations.   Gastrointestinal:  Negative for abdominal pain and blood in stool.   Endocrine: Negative for cold intolerance and heat intolerance.   Genitourinary:  Negative for  hematuria.   Musculoskeletal:  Negative for gait problem.   Skin:  Negative for color change.   Allergic/Immunologic: Negative for immunocompromised state.   Neurological:  Negative for weakness and light-headedness.   Hematological:  Negative for adenopathy.   Psychiatric/Behavioral:  Negative for sleep disturbance. The patient is not nervous/anxious.          ACTIVE PROBLEMS  Patient Active Problem List    Diagnosis     Moderate persistent asthma [J45.40]     Prediabetes [R73.03]     Ulcerative pancolitis [K51.00]     Acute upper respiratory infection [J06.9]     Gastroesophageal reflux disease with esophagitis [K21.00]     GERD (gastroesophageal reflux disease) [K21.9]     Hematuria [R31.9]     Osteoarthritis of knee [M17.9]     Plantar fasciitis [M72.2]     Routine health maintenance [Z00.00]     Rheumatoid arthritis involving multiple sites with positive rheumatoid factor [M05.79]     TIA (transient ischemic attack) [G45.9]     Preop cardiovascular exam [Z01.810]     Leukopenia [D72.819]     Depression [F32.A]     Hyperlipidemia [E78.5]     Hypothyroidism [E03.9]     Age-related osteoporosis without current pathological fracture [M81.0]     WPW (Liseth-Parkinson-White syndrome) [I45.6]     Non-specific colitis [K52.9]     Allergic rhinitis [J30.9]     Primary osteoarthritis of knees, bilateral [M17.0]          PAST MEDICAL HISTORY  Past Medical History:   Diagnosis Date    Anxiety     Arthritis     Arthritis     Asthma     Colon polyp     Cough     Disease of thyroid gland     Environmental allergies     GERD (gastroesophageal reflux disease)     Hyperlipidemia     Hypothyroidism     Osteopenia     Rheumatoid arthritis     Shingles     TIA (transient ischemic attack)     Ulcerative (chronic) rectosigmoiditis with rectal bleeding     Ulcerative colitis     WPW (Liseth-Parkinson-White syndrome)          SURGICAL HISTORY  Past Surgical History:   Procedure Laterality Date    COLONOSCOPY N/A 05/13/2016    Procedure:  COLONOSCOPY with biopsies;  Surgeon: Dl Saldaña MD;  Location: [facility] OR;  Service:     COLONOSCOPY N/A 11/23/2022    Procedure: COLONOSCOPY WITH BIOPSIES, POLYPECTOMY;  Surgeon: Dl Saldaña MD;  Location: [facility] OR;  Service: Gastroenterology;  Laterality: N/A;  Diverticulosis  Biopsies: right colon, transverse, sigmoid, and rectal biopsies  Sigmoid colon polyp at 20cm    COLONOSCOPY W/ POLYPECTOMY      HYSTERECTOMY      REPLACEMENT TOTAL KNEE Left 10/09/2018    [facility], Dr. Simon Barber, surgeon    SIGMOIDOSCOPY N/A 12/13/2017    Procedure: SIGMOIDOSCOPY FLEXIBLE with biopsy;  Surgeon: Dl Sadlaña MD;  Location: [facility] OR;  Service:     THYROID LOBECTOMY Right     TOTAL KNEE ARTHROPLASTY Right 11/20/2018         FAMILY HISTORY  Family History   Problem Relation Age of Onset    Breast cancer Mother     Breast cancer Sister     Colon cancer Father     Colon cancer Brother          SOCIAL HISTORY  Social History     Occupational History    Not on file   Tobacco Use    Smoking status: Never     Passive exposure: Never    Smokeless tobacco: Never   Vaping Use    Vaping Use: Never used   Substance and Sexual Activity    Alcohol use: Yes     Alcohol/week: 3.0 standard drinks of alcohol     Types: 3 Cans of beer per week     Comment: twice week//caffeine yes    Drug use: No    Sexual activity: Defer         CURRENT MEDICATIONS    Current Outpatient Medications:     Acetaminophen (TYLENOL ARTHRITIS PAIN PO), Take 1 tablet by mouth 3 (Three) Times a Day. AS NEEDED FOR ARTHRITIS, Disp: , Rfl:     albuterol sulfate  (90 Base) MCG/ACT inhaler, Inhale 2 puffs Every 4 (Four) Hours As Needed for Wheezing., Disp: 18 g, Rfl: 1    aspirin 81 MG chewable tablet, Chew 1 tablet Daily., Disp: , Rfl:     Calcium Carb-Cholecalciferol (CALCIUM PLUS VITAMIN D3 PO), Take 1,400 mg by mouth Daily. D3 = 1500 IU, Disp: , Rfl:     cetirizine (ZyrTEC) 10 MG tablet, Take 1 tablet by  "mouth Daily., Disp: , Rfl:     denosumab (PROLIA) 60 MG/ML solution prefilled syringe syringe, Inject  under the skin into the appropriate area as directed 1 (One) Time., Disp: , Rfl:     Fluticasone-Salmeterol (ADVAIR/WIXELA) 250-50 MCG/ACT DISKUS, Inhale 1 puff 2 (Two) Times a Day., Disp: 3 each, Rfl: 1    InFLIXimab (REMICADE IV), Infuse 1 dose into a venous catheter Every 30 (Thirty) Days. Every 60 days.  Dr. Saldaña., Disp: , Rfl:     levothyroxine (SYNTHROID, LEVOTHROID) 100 MCG tablet, Take 1 tablet by mouth Daily., Disp: 90 tablet, Rfl: 1    montelukast (Singulair) 10 MG tablet, Take 1 tablet by mouth Every Night., Disp: 90 tablet, Rfl: 1    Multiple Vitamins-Minerals (MULTIVITAMIN WITH MINERALS) tablet tablet, Take 1 tablet by mouth Daily., Disp: , Rfl:     omeprazole (priLOSEC) 20 MG capsule, TAKE 1 CAPSULE BY MOUTH DAILY, Disp: 90 capsule, Rfl: 1    Probiotic Product (PROBIOTIC DAILY PO), Take 2 capsules by mouth Daily. OTC Probiotic, Disp: , Rfl:     venlafaxine XR (EFFEXOR-XR) 37.5 MG 24 hr capsule, Take 1 capsule by mouth Daily., Disp: 30 capsule, Rfl: 2    ALLERGIES  Sulfa antibiotics    VITALS  Vitals:    12/20/23 1022   BP: 120/80   BP Location: Left arm   Patient Position: Sitting   Cuff Size: Adult   Weight: 80.6 kg (177 lb 12.8 oz)   Height: 157.5 cm (62\")       PHYSICAL EXAM  Debilities/Disabilities Identified: None  Emotional Behavior: Appropriate  Wt Readings from Last 3 Encounters:   12/20/23 80.6 kg (177 lb 12.8 oz)   12/13/23 80.2 kg (176 lb 12.8 oz)   11/22/23 80.9 kg (178 lb 4.8 oz)     Ht Readings from Last 1 Encounters:   12/20/23 157.5 cm (62\")     Body mass index is 32.52 kg/m².  Physical Exam  Constitutional:       Appearance: She is well-developed. She is not diaphoretic.   HENT:      Head: Normocephalic and atraumatic.   Eyes:      General: No scleral icterus.     Conjunctiva/sclera: Conjunctivae normal.      Pupils: Pupils are equal, round, and reactive to light.   Neck:      " Thyroid: No thyromegaly.   Cardiovascular:      Rate and Rhythm: Normal rate and regular rhythm.      Heart sounds: Normal heart sounds. No murmur heard.     No gallop.   Pulmonary:      Effort: Pulmonary effort is normal.      Breath sounds: Normal breath sounds. No wheezing or rales.   Abdominal:      General: Bowel sounds are normal. There is no distension or abdominal bruit.      Palpations: Abdomen is soft. There is no shifting dullness, fluid wave or mass.      Tenderness: There is no abdominal tenderness. There is no guarding. Negative signs include Sorto's sign.      Hernia: There is no hernia in the ventral area.   Musculoskeletal:         General: Normal range of motion.      Cervical back: Normal range of motion and neck supple.   Lymphadenopathy:      Cervical: No cervical adenopathy.   Skin:     General: Skin is warm and dry.      Findings: No erythema or rash.   Neurological:      Mental Status: She is alert and oriented to person, place, and time.   Psychiatric:         Mood and Affect: Mood normal.         Behavior: Behavior normal.         CLINICAL DATA REVIEWED   reviewed previous lab results and integrated with today's visit, reviewed notes from other physicians and/or last GI encounter, reviewed previous endoscopy results and available photos, reviewed surgical pathology results from previous biopsies    ASSESSMENT  Diagnoses and all orders for this visit:    Ulcerative pancolitis without complication  -     Reticulocytes; Future  -     CBC & Differential  -     High Sensitivity CRP; Future  -     Calprotectin, Fecal - Stool, Per Rectum    Encounter for screening for cardiovascular disorders  -     High Sensitivity CRP; Future    Ulcerative pancolitis    Gastroesophageal reflux disease with esophagitis, unspecified whether hemorrhage          PLAN  Return in about 3 months (around 3/20/2024).    I have discussed the above plan with the patient.  They verbalize understanding and are in agreement  with the plan.  They have been advised to contact the office for any questions, concerns, or changes related to their health.

## 2023-12-20 NOTE — H&P (VIEW-ONLY)
PATIENT INFORMATION  Yelena Rangel       - 1950    CHIEF COMPLAINT  Chief Complaint   Patient presents with    Ulcerative pancolitis    Heartburn    Osteoporosis       HISTORY OF PRESENT ILLNESS  Had a rash reaction and had her Statin stopped and is already better form that and her Plts are increased to 500k    No gi Symptoms but her arhtritis has been worse as well but that didn't get better off the statin    Discussed the proposed inflammatory process htat has her BM stimulated and the proposed w/u        REVIEWED PERTINENT RESULTS/ LABS  Lab Results   Component Value Date    CASEREPORT  2022     Surgical Pathology Report                         Case: [id number]                                  Authorizing Provider:  Dl Saldaña        Collected:           2022 02:29 PM                                 MD Ivana                                                                   Ordering Location:     [facility]   Received:            2022 03:24 PM                                 OR                                                                           Pathologist:           Naveen Peraza MD                                                         Specimens:   1) - Large Intestine, Right / Ascending Colon, biopsies                                             2) - Large Intestine, Transverse Colon, biopsies                                                    3) - Large Intestine, Sigmoid Colon, biopsies                                                       4) - Large Intestine, Sigmoid Colon, polyp at 20 cm                                                 5) - Large Intestine, Rectum, biopsies                                                     FINALDX  2022     1. Colon, Right Ascending, Biopsy:  Benign colonic mucosa with   A. No hyperplastic or tubulovillous change.   B. No significant inflammation.   C. No crypt distortion or basement membrane  thickening.   D. No viral inclusions or other organisms on routinely stained sections.   E. Prominent lymphoid aggregates.    2. Colon, Transverse, Biopsy:  Benign colonic mucosa with   A. No hyperplastic or tubulovillous change.   B. No significant inflammation.   C. No crypt distortion or basement membrane thickening.   D. No viral inclusions or other organisms on routinely stained sections.   E. Prominent lymphoid aggregates.    3. Colon, Sigmoid, Biopsy:  Benign colonic mucosa with   A. No significant inflammation.   B. No cryptitis or crypt abscess.   C. No dysplasia.   D. Minimal distortion of the crypt architecture.    4. Colon, Sigmoid at 20 cm, Biopsy:  Benign colonic mucosa with   A. Focal erosion.   B. Acute inflammation of the lamina propria.   C. Granulation like tissue.   D. Hyperplastic changes.   E. No dysplasia.    5. Rectum, Biopsy:  Benign colonic mucosa with   A. No hyperplastic or tubulovillous change.   B. No significant inflammation.   C. No crypt distortion or basement membrane thickening.   D. No viral inclusions or other organisms on routinely stained sections.    LYNDSAY/clm       Lab Results   Component Value Date    HGB 11.2 (L) 12/15/2023    MCV 88.2 12/15/2023     (H) 12/15/2023    ALT 9 11/22/2023    AST 13 11/22/2023    HGBA1C 6.10 (H) 11/15/2023    INR 1.08 03/10/2020    TRIG 108 11/15/2023    FERRITIN 272.00 (H) 12/15/2023    IRON 27 (L) 11/22/2023    TIBC 302 12/15/2023      No results found.    REVIEW OF SYSTEMS  Review of Systems   Constitutional:  Negative for activity change, chills, fever and unexpected weight change.   HENT:  Negative for congestion.    Eyes:  Negative for visual disturbance.   Respiratory:  Negative for shortness of breath.    Cardiovascular:  Negative for chest pain and palpitations.   Gastrointestinal:  Negative for abdominal pain and blood in stool.   Endocrine: Negative for cold intolerance and heat intolerance.   Genitourinary:  Negative for  hematuria.   Musculoskeletal:  Negative for gait problem.   Skin:  Negative for color change.   Allergic/Immunologic: Negative for immunocompromised state.   Neurological:  Negative for weakness and light-headedness.   Hematological:  Negative for adenopathy.   Psychiatric/Behavioral:  Negative for sleep disturbance. The patient is not nervous/anxious.          ACTIVE PROBLEMS  Patient Active Problem List    Diagnosis     Moderate persistent asthma [J45.40]     Prediabetes [R73.03]     Ulcerative pancolitis [K51.00]     Acute upper respiratory infection [J06.9]     Gastroesophageal reflux disease with esophagitis [K21.00]     GERD (gastroesophageal reflux disease) [K21.9]     Hematuria [R31.9]     Osteoarthritis of knee [M17.9]     Plantar fasciitis [M72.2]     Routine health maintenance [Z00.00]     Rheumatoid arthritis involving multiple sites with positive rheumatoid factor [M05.79]     TIA (transient ischemic attack) [G45.9]     Preop cardiovascular exam [Z01.810]     Leukopenia [D72.819]     Depression [F32.A]     Hyperlipidemia [E78.5]     Hypothyroidism [E03.9]     Age-related osteoporosis without current pathological fracture [M81.0]     WPW (Liseth-Parkinson-White syndrome) [I45.6]     Non-specific colitis [K52.9]     Allergic rhinitis [J30.9]     Primary osteoarthritis of knees, bilateral [M17.0]          PAST MEDICAL HISTORY  Past Medical History:   Diagnosis Date    Anxiety     Arthritis     Arthritis     Asthma     Colon polyp     Cough     Disease of thyroid gland     Environmental allergies     GERD (gastroesophageal reflux disease)     Hyperlipidemia     Hypothyroidism     Osteopenia     Rheumatoid arthritis     Shingles     TIA (transient ischemic attack)     Ulcerative (chronic) rectosigmoiditis with rectal bleeding     Ulcerative colitis     WPW (Liseth-Parkinson-White syndrome)          SURGICAL HISTORY  Past Surgical History:   Procedure Laterality Date    COLONOSCOPY N/A 05/13/2016    Procedure:  COLONOSCOPY with biopsies;  Surgeon: Dl Saldaña MD;  Location: [facility] OR;  Service:     COLONOSCOPY N/A 11/23/2022    Procedure: COLONOSCOPY WITH BIOPSIES, POLYPECTOMY;  Surgeon: Dl Saldaña MD;  Location: [facility] OR;  Service: Gastroenterology;  Laterality: N/A;  Diverticulosis  Biopsies: right colon, transverse, sigmoid, and rectal biopsies  Sigmoid colon polyp at 20cm    COLONOSCOPY W/ POLYPECTOMY      HYSTERECTOMY      REPLACEMENT TOTAL KNEE Left 10/09/2018    [facility], Dr. Simon Barber, surgeon    SIGMOIDOSCOPY N/A 12/13/2017    Procedure: SIGMOIDOSCOPY FLEXIBLE with biopsy;  Surgeon: Dl Saldaña MD;  Location: [facility] OR;  Service:     THYROID LOBECTOMY Right     TOTAL KNEE ARTHROPLASTY Right 11/20/2018         FAMILY HISTORY  Family History   Problem Relation Age of Onset    Breast cancer Mother     Breast cancer Sister     Colon cancer Father     Colon cancer Brother          SOCIAL HISTORY  Social History     Occupational History    Not on file   Tobacco Use    Smoking status: Never     Passive exposure: Never    Smokeless tobacco: Never   Vaping Use    Vaping Use: Never used   Substance and Sexual Activity    Alcohol use: Yes     Alcohol/week: 3.0 standard drinks of alcohol     Types: 3 Cans of beer per week     Comment: twice week//caffeine yes    Drug use: No    Sexual activity: Defer         CURRENT MEDICATIONS    Current Outpatient Medications:     Acetaminophen (TYLENOL ARTHRITIS PAIN PO), Take 1 tablet by mouth 3 (Three) Times a Day. AS NEEDED FOR ARTHRITIS, Disp: , Rfl:     albuterol sulfate  (90 Base) MCG/ACT inhaler, Inhale 2 puffs Every 4 (Four) Hours As Needed for Wheezing., Disp: 18 g, Rfl: 1    aspirin 81 MG chewable tablet, Chew 1 tablet Daily., Disp: , Rfl:     Calcium Carb-Cholecalciferol (CALCIUM PLUS VITAMIN D3 PO), Take 1,400 mg by mouth Daily. D3 = 1500 IU, Disp: , Rfl:     cetirizine (ZyrTEC) 10 MG tablet, Take 1 tablet by  "mouth Daily., Disp: , Rfl:     denosumab (PROLIA) 60 MG/ML solution prefilled syringe syringe, Inject  under the skin into the appropriate area as directed 1 (One) Time., Disp: , Rfl:     Fluticasone-Salmeterol (ADVAIR/WIXELA) 250-50 MCG/ACT DISKUS, Inhale 1 puff 2 (Two) Times a Day., Disp: 3 each, Rfl: 1    InFLIXimab (REMICADE IV), Infuse 1 dose into a venous catheter Every 30 (Thirty) Days. Every 60 days.  Dr. Saldaña., Disp: , Rfl:     levothyroxine (SYNTHROID, LEVOTHROID) 100 MCG tablet, Take 1 tablet by mouth Daily., Disp: 90 tablet, Rfl: 1    montelukast (Singulair) 10 MG tablet, Take 1 tablet by mouth Every Night., Disp: 90 tablet, Rfl: 1    Multiple Vitamins-Minerals (MULTIVITAMIN WITH MINERALS) tablet tablet, Take 1 tablet by mouth Daily., Disp: , Rfl:     omeprazole (priLOSEC) 20 MG capsule, TAKE 1 CAPSULE BY MOUTH DAILY, Disp: 90 capsule, Rfl: 1    Probiotic Product (PROBIOTIC DAILY PO), Take 2 capsules by mouth Daily. OTC Probiotic, Disp: , Rfl:     venlafaxine XR (EFFEXOR-XR) 37.5 MG 24 hr capsule, Take 1 capsule by mouth Daily., Disp: 30 capsule, Rfl: 2    ALLERGIES  Sulfa antibiotics    VITALS  Vitals:    12/20/23 1022   BP: 120/80   BP Location: Left arm   Patient Position: Sitting   Cuff Size: Adult   Weight: 80.6 kg (177 lb 12.8 oz)   Height: 157.5 cm (62\")       PHYSICAL EXAM  Debilities/Disabilities Identified: None  Emotional Behavior: Appropriate  Wt Readings from Last 3 Encounters:   12/20/23 80.6 kg (177 lb 12.8 oz)   12/13/23 80.2 kg (176 lb 12.8 oz)   11/22/23 80.9 kg (178 lb 4.8 oz)     Ht Readings from Last 1 Encounters:   12/20/23 157.5 cm (62\")     Body mass index is 32.52 kg/m².  Physical Exam  Constitutional:       Appearance: She is well-developed. She is not diaphoretic.   HENT:      Head: Normocephalic and atraumatic.   Eyes:      General: No scleral icterus.     Conjunctiva/sclera: Conjunctivae normal.      Pupils: Pupils are equal, round, and reactive to light.   Neck:      " Thyroid: No thyromegaly.   Cardiovascular:      Rate and Rhythm: Normal rate and regular rhythm.      Heart sounds: Normal heart sounds. No murmur heard.     No gallop.   Pulmonary:      Effort: Pulmonary effort is normal.      Breath sounds: Normal breath sounds. No wheezing or rales.   Abdominal:      General: Bowel sounds are normal. There is no distension or abdominal bruit.      Palpations: Abdomen is soft. There is no shifting dullness, fluid wave or mass.      Tenderness: There is no abdominal tenderness. There is no guarding. Negative signs include Sorto's sign.      Hernia: There is no hernia in the ventral area.   Musculoskeletal:         General: Normal range of motion.      Cervical back: Normal range of motion and neck supple.   Lymphadenopathy:      Cervical: No cervical adenopathy.   Skin:     General: Skin is warm and dry.      Findings: No erythema or rash.   Neurological:      Mental Status: She is alert and oriented to person, place, and time.   Psychiatric:         Mood and Affect: Mood normal.         Behavior: Behavior normal.         CLINICAL DATA REVIEWED   reviewed previous lab results and integrated with today's visit, reviewed notes from other physicians and/or last GI encounter, reviewed previous endoscopy results and available photos, reviewed surgical pathology results from previous biopsies    ASSESSMENT  Diagnoses and all orders for this visit:    Ulcerative pancolitis without complication  -     Reticulocytes; Future  -     CBC & Differential  -     High Sensitivity CRP; Future  -     Calprotectin, Fecal - Stool, Per Rectum    Encounter for screening for cardiovascular disorders  -     High Sensitivity CRP; Future    Ulcerative pancolitis    Gastroesophageal reflux disease with esophagitis, unspecified whether hemorrhage          PLAN  Return in about 3 months (around 3/20/2024).    I have discussed the above plan with the patient.  They verbalize understanding and are in agreement  with the plan.  They have been advised to contact the office for any questions, concerns, or changes related to their health.

## 2023-12-22 ENCOUNTER — TELEPHONE (OUTPATIENT)
Dept: GASTROENTEROLOGY | Facility: CLINIC | Age: 73
End: 2023-12-22
Payer: MEDICARE

## 2023-12-26 DIAGNOSIS — M81.0 AGE-RELATED OSTEOPOROSIS WITHOUT CURRENT PATHOLOGICAL FRACTURE: Primary | ICD-10-CM

## 2023-12-27 ENCOUNTER — APPOINTMENT (OUTPATIENT)
Dept: BONE DENSITY | Facility: HOSPITAL | Age: 73
End: 2023-12-27
Payer: MEDICARE

## 2023-12-27 ENCOUNTER — HOSPITAL ENCOUNTER (OUTPATIENT)
Dept: INFUSION THERAPY | Facility: HOSPITAL | Age: 73
Discharge: HOME | End: 2023-12-27
Payer: MEDICARE

## 2023-12-27 ENCOUNTER — HOSPITAL ENCOUNTER (OUTPATIENT)
Dept: MAMMOGRAPHY | Facility: HOSPITAL | Age: 73
Discharge: HOME | End: 2023-12-27
Payer: MEDICARE

## 2023-12-27 VITALS
RESPIRATION RATE: 18 BRPM | OXYGEN SATURATION: 97 % | DIASTOLIC BLOOD PRESSURE: 84 MMHG | TEMPERATURE: 96.8 F | SYSTOLIC BLOOD PRESSURE: 142 MMHG | HEART RATE: 80 BPM

## 2023-12-27 DIAGNOSIS — M81.0 AGE-RELATED OSTEOPOROSIS WITHOUT CURRENT PATHOLOGICAL FRACTURE: ICD-10-CM

## 2023-12-27 DIAGNOSIS — M81.0 AGE-RELATED OSTEOPOROSIS WITHOUT CURRENT PATHOLOGICAL FRACTURE: Primary | ICD-10-CM

## 2023-12-27 DIAGNOSIS — Z12.31 ENCOUNTER FOR SCREENING MAMMOGRAM FOR MALIGNANT NEOPLASM OF BREAST: ICD-10-CM

## 2023-12-27 PROCEDURE — 77080 DXA BONE DENSITY AXIAL: CPT

## 2023-12-27 PROCEDURE — 77063 BREAST TOMOSYNTHESIS BI: CPT

## 2023-12-27 PROCEDURE — 77067 SCR MAMMO BI INCL CAD: CPT

## 2023-12-27 NOTE — PATIENT INSTRUCTIONS
"  Call [facility] at [phone] if you have any problems or concerns.    We know you have a Choice in healthcare and appreciate you using [facility].  Our purpose is to provide you \"Excellent Care\".  We hope that you will always choose us in the future and continue to recommend us to your family and friends.              "

## 2023-12-27 NOTE — NURSING NOTE
Pt arrived to [facility] for appt. VSS, no complaints at this time. Therapy plan was not available. [place] [facility] [place] Unable to administer medication at this time. Pt informed and verbalized understanding. Pt discharged from [facility] at 9:25 AM in stable condition, without complaints.

## 2023-12-28 ENCOUNTER — HOSPITAL ENCOUNTER (OUTPATIENT)
Dept: INFUSION THERAPY | Facility: HOSPITAL | Age: 73
Discharge: HOME | End: 2023-12-28
Payer: MEDICARE

## 2023-12-28 VITALS
RESPIRATION RATE: 16 BRPM | SYSTOLIC BLOOD PRESSURE: 143 MMHG | OXYGEN SATURATION: 97 % | DIASTOLIC BLOOD PRESSURE: 89 MMHG | TEMPERATURE: 96.6 F | HEART RATE: 69 BPM

## 2023-12-28 DIAGNOSIS — M81.0 AGE-RELATED OSTEOPOROSIS WITHOUT CURRENT PATHOLOGICAL FRACTURE: Primary | ICD-10-CM

## 2023-12-28 LAB — CALPROTECTIN STL-MCNT: 229 UG/G (ref 0–120)

## 2023-12-28 PROCEDURE — 25010000002 DENOSUMAB 60 MG/ML SOLUTION PREFILLED SYRINGE: Performed by: FAMILY MEDICINE

## 2023-12-28 PROCEDURE — 96372 THER/PROPH/DIAG INJ SC/IM: CPT

## 2023-12-28 RX ADMIN — DENOSUMAB 60 MG: 60 INJECTION SUBCUTANEOUS at 10:04

## 2023-12-28 NOTE — NURSING NOTE
Patient arrived to [facility] at 0950.  History and medications reviewed with patient.  Medication administered as ordered without complications.  AVS refused by patient.  Patient discharged at 1009 in stable condition without complications.

## 2024-01-03 DIAGNOSIS — K51.20 ULCERATIVE PROCTITIS WITHOUT COMPLICATION: Primary | ICD-10-CM

## 2024-01-04 ENCOUNTER — TELEPHONE (OUTPATIENT)
Dept: GASTROENTEROLOGY | Facility: CLINIC | Age: 74
End: 2024-01-04
Payer: MEDICARE

## 2024-01-04 PROBLEM — K51.20 ULCERATIVE PROCTITIS WITHOUT COMPLICATION: Status: ACTIVE | Noted: 2024-01-03

## 2024-01-04 NOTE — TELEPHONE ENCOUNTER
Scheduled for Flexible Sigmoidoscopy not colonoscopy.    New prep instructions were sent my MyChart and emailed.  See LETTERS tab.

## 2024-01-04 NOTE — TELEPHONE ENCOUNTER
Spoke with patient.  Scheduled at [facility] 01/11/2024 at 3:30pm - arrive 2:30am.  Email prep instructions and send by Namelyt per patient request.

## 2024-01-10 ENCOUNTER — ANESTHESIA EVENT (OUTPATIENT)
Dept: PERIOP | Facility: HOSPITAL | Age: 74
End: 2024-01-10
Payer: MEDICARE

## 2024-01-11 ENCOUNTER — HOSPITAL ENCOUNTER (OUTPATIENT)
Facility: HOSPITAL | Age: 74
Setting detail: HOSPITAL OUTPATIENT SURGERY
Discharge: HOME | End: 2024-01-11
Attending: INTERNAL MEDICINE | Admitting: INTERNAL MEDICINE
Payer: MEDICARE

## 2024-01-11 ENCOUNTER — ANESTHESIA (OUTPATIENT)
Dept: PERIOP | Facility: HOSPITAL | Age: 74
End: 2024-01-11
Payer: MEDICARE

## 2024-01-11 VITALS
WEIGHT: 175.6 LBS | HEIGHT: 62 IN | HEART RATE: 72 BPM | SYSTOLIC BLOOD PRESSURE: 148 MMHG | RESPIRATION RATE: 16 BRPM | TEMPERATURE: 97.3 F | BODY MASS INDEX: 32.31 KG/M2 | DIASTOLIC BLOOD PRESSURE: 85 MMHG | OXYGEN SATURATION: 97 %

## 2024-01-11 DIAGNOSIS — K51.20 ULCERATIVE PROCTITIS WITHOUT COMPLICATION: ICD-10-CM

## 2024-01-11 PROCEDURE — 82397 CHEMILUMINESCENT ASSAY: CPT | Performed by: INTERNAL MEDICINE

## 2024-01-11 PROCEDURE — 80230 DRUG ASSAY INFLIXIMAB: CPT | Performed by: INTERNAL MEDICINE

## 2024-01-11 PROCEDURE — 25810000003 LACTATED RINGERS PER 1000 ML: Performed by: NURSE ANESTHETIST, CERTIFIED REGISTERED

## 2024-01-11 PROCEDURE — 45331 SIGMOIDOSCOPY AND BIOPSY: CPT | Performed by: INTERNAL MEDICINE

## 2024-01-11 PROCEDURE — 25010000002 PROPOFOL 200 MG/20ML EMULSION: Performed by: NURSE ANESTHETIST, CERTIFIED REGISTERED

## 2024-01-11 PROCEDURE — 88305 TISSUE EXAM BY PATHOLOGIST: CPT | Performed by: INTERNAL MEDICINE

## 2024-01-11 RX ORDER — SODIUM CHLORIDE 9 MG/ML
40 INJECTION, SOLUTION INTRAVENOUS AS NEEDED
Status: DISCONTINUED | OUTPATIENT
Start: 2024-01-11 | End: 2024-01-11 | Stop reason: HOSPADM

## 2024-01-11 RX ORDER — SODIUM CHLORIDE 0.9 % (FLUSH) 0.9 %
10 SYRINGE (ML) INJECTION EVERY 12 HOURS SCHEDULED
Status: DISCONTINUED | OUTPATIENT
Start: 2024-01-11 | End: 2024-01-11 | Stop reason: HOSPADM

## 2024-01-11 RX ORDER — PROPOFOL 10 MG/ML
INJECTION, EMULSION INTRAVENOUS AS NEEDED
Status: DISCONTINUED | OUTPATIENT
Start: 2024-01-11 | End: 2024-01-11 | Stop reason: SURG

## 2024-01-11 RX ORDER — DEXAMETHASONE SODIUM PHOSPHATE 4 MG/ML
4 INJECTION, SOLUTION INTRA-ARTICULAR; INTRALESIONAL; INTRAMUSCULAR; INTRAVENOUS; SOFT TISSUE ONCE AS NEEDED
Status: DISCONTINUED | OUTPATIENT
Start: 2024-01-11 | End: 2024-01-11 | Stop reason: HOSPADM

## 2024-01-11 RX ORDER — MIDAZOLAM HYDROCHLORIDE 2 MG/2ML
0.5 INJECTION, SOLUTION INTRAMUSCULAR; INTRAVENOUS
Status: DISCONTINUED | OUTPATIENT
Start: 2024-01-11 | End: 2024-01-11 | Stop reason: HOSPADM

## 2024-01-11 RX ORDER — SODIUM CHLORIDE, SODIUM LACTATE, POTASSIUM CHLORIDE, CALCIUM CHLORIDE 600; 310; 30; 20 MG/100ML; MG/100ML; MG/100ML; MG/100ML
9 INJECTION, SOLUTION INTRAVENOUS CONTINUOUS PRN
Status: DISCONTINUED | OUTPATIENT
Start: 2024-01-11 | End: 2024-01-11 | Stop reason: HOSPADM

## 2024-01-11 RX ORDER — ONDANSETRON 2 MG/ML
4 INJECTION INTRAMUSCULAR; INTRAVENOUS ONCE AS NEEDED
Status: DISCONTINUED | OUTPATIENT
Start: 2024-01-11 | End: 2024-01-11 | Stop reason: HOSPADM

## 2024-01-11 RX ORDER — SODIUM CHLORIDE 0.9 % (FLUSH) 0.9 %
10 SYRINGE (ML) INJECTION AS NEEDED
Status: DISCONTINUED | OUTPATIENT
Start: 2024-01-11 | End: 2024-01-11 | Stop reason: HOSPADM

## 2024-01-11 RX ORDER — FAMOTIDINE 10 MG/ML
20 INJECTION, SOLUTION INTRAVENOUS
Status: DISCONTINUED | OUTPATIENT
Start: 2024-01-11 | End: 2024-01-11 | Stop reason: HOSPADM

## 2024-01-11 RX ORDER — SODIUM CHLORIDE, SODIUM LACTATE, POTASSIUM CHLORIDE, CALCIUM CHLORIDE 600; 310; 30; 20 MG/100ML; MG/100ML; MG/100ML; MG/100ML
100 INJECTION, SOLUTION INTRAVENOUS CONTINUOUS
Status: DISCONTINUED | OUTPATIENT
Start: 2024-01-11 | End: 2024-01-11 | Stop reason: HOSPADM

## 2024-01-11 RX ORDER — LIDOCAINE HYDROCHLORIDE 20 MG/ML
INJECTION, SOLUTION INFILTRATION; PERINEURAL AS NEEDED
Status: DISCONTINUED | OUTPATIENT
Start: 2024-01-11 | End: 2024-01-11 | Stop reason: SURG

## 2024-01-11 RX ORDER — FLUTICASONE PROPIONATE AND SALMETEROL XINAFOATE 45; 21 UG/1; UG/1
2 AEROSOL, METERED RESPIRATORY (INHALATION)
COMMUNITY

## 2024-01-11 RX ADMIN — SODIUM CHLORIDE, POTASSIUM CHLORIDE, SODIUM LACTATE AND CALCIUM CHLORIDE 9 ML/HR: 600; 310; 30; 20 INJECTION, SOLUTION INTRAVENOUS at 13:56

## 2024-01-11 RX ADMIN — LIDOCAINE HYDROCHLORIDE 50 MG: 20 INJECTION, SOLUTION INFILTRATION; PERINEURAL at 15:24

## 2024-01-11 RX ADMIN — PROPOFOL INJECTABLE EMULSION 30 MG: 10 INJECTION, EMULSION INTRAVENOUS at 15:33

## 2024-01-11 RX ADMIN — PROPOFOL INJECTABLE EMULSION 30 MG: 10 INJECTION, EMULSION INTRAVENOUS at 15:27

## 2024-01-11 RX ADMIN — PROPOFOL INJECTABLE EMULSION 30 MG: 10 INJECTION, EMULSION INTRAVENOUS at 15:30

## 2024-01-11 RX ADMIN — PROPOFOL INJECTABLE EMULSION 80 MG: 10 INJECTION, EMULSION INTRAVENOUS at 15:24

## 2024-01-11 NOTE — ANESTHESIA PREPROCEDURE EVALUATION
Anesthesia Evaluation     Patient summary reviewed and Nursing notes reviewed   history of anesthetic complications:   NPO Solid Status: > 8 hours  NPO Liquid Status: > 8 hours           Airway   Mallampati: II  TM distance: >3 FB  Neck ROM: full  No difficulty expected  Dental    (+) upper dentures    Pulmonary - normal exam    breath sounds clear to auscultation  (+) asthma,  Cardiovascular - normal exam  Exercise tolerance: good (4-7 METS)    ECG reviewed  Rhythm: regular  Rate: normal    (+) dysrhythmias (WPW), hyperlipidemia    ROS comment: EKG 3/10/20:  - NORMAL ECG -  Sinus rhythm  NO PRIOR TRACING AVAILABLE FOR COMPARISON    EF 50-55%    Neuro/Psych  (+) TIA (2018), headaches (Migraines 2x/ year - cause speech impairment), psychiatric history Anxiety and Depression  GI/Hepatic/Renal/Endo    (+) GERD, thyroid problem hypothyroidism    Musculoskeletal     (+) arthralgias  Abdominal  - normal exam    Abdomen: soft.  Bowel sounds: normal.   Substance History   (+) alcohol use (3 drinks per week)     OB/GYN negative ob/gyn ROS         Other   arthritis (RA),                       Anesthesia Plan    ASA 3     MAC     intravenous induction     Anesthetic plan, risks, benefits, and alternatives have been provided, discussed and informed consent has been obtained with: patient.  Pre-procedure education provided  Use of blood products discussed with patient  Consented to blood products.    Plan discussed with CRNA.        CODE STATUS:

## 2024-01-11 NOTE — INTERVAL H&P NOTE
"Vital Signs  /93 (BP Location: Left arm, Patient Position: Sitting)   Pulse 80   Temp 97.7 °F (36.5 °C) (Oral)   Resp 18   Ht 157.5 cm (62\")   Wt 79.7 kg (175 lb 9.6 oz)   LMP  (LMP Unknown)   BMI 32.12 kg/m²     H&P reviewed. The patient was examined and there are no changes to the H&P.        "

## 2024-01-11 NOTE — BRIEF OP NOTE
SIGMOIDOSCOPY FLEXIBLE  Progress Note    Yelena Rangel  1/11/2024    Pre-op Diagnosis:   Ulcerative proctitis without complication [K51.20]       Post-Op Diagnosis Codes:     * Ulcerative proctitis without complication [K51.20]     * Diverticulosis [K57.90]    Procedure/CPT® Codes:        Procedure(s):  SIGMOIDOSCOPY FLEXIBLE              Surgeon(s):  Dl Saldaña MD    Anesthesia: Monitored Anesthesia Care    Staff:   Circulator: Heidi Perez RN  Scrub Person: Shaylee Ansari         Estimated Blood Loss: none    Urine Voided: * No values recorded between 1/11/2024  3:15 PM and 1/11/2024  3:35 PM *    Specimens:                Specimens       ID Source Type Tests Collected By Collected At Frozen?    A Large Intestine, Sigmoid Colon Tissue TISSUE PATHOLOGY EXAM   Dl Saldaña MD 1/11/24 1528     Description: BIOPSIES    This specimen was not marked as sent.    B Large Intestine, Rectum Tissue TISSUE PATHOLOGY EXAM   Dl Saldaña MD 1/11/24 1528     Description: BIOPSIES    This specimen was not marked as sent.                  Drains: * No LDAs found *    Findings: Flexible Sigmoidoscopy to Transverse colon Good Prep  Sigmoid Diverticulosis  O/W Normal Mucosa-Micro-Colitis biopsy(Sigmoid/rectal)        Complications: none          Dl Saldaña MD     Date: 1/11/2024  Time: 15:38 EST

## 2024-01-11 NOTE — ANESTHESIA POSTPROCEDURE EVALUATION
Patient: Yelena Rangel    Procedure Summary       Date: 01/11/24 Room / Location: [facility] ENDOSCOPY 1 /  [facility] OR    Anesthesia Start: 1514 Anesthesia Stop: 1538    Procedure: SIGMOIDOSCOPY FLEXIBLE Diagnosis:       Ulcerative proctitis without complication      Diverticulosis      (Ulcerative proctitis without complication [K51.20])    Surgeons: Dl Saldaña MD Provider: Jovan Mendiola CRNA    Anesthesia Type: MAC ASA Status: 3            Anesthesia Type: MAC    Vitals  Vitals Value Taken Time   /85 01/11/24 1550   Temp 97.3 °F (36.3 °C) 01/11/24 1540   Pulse 73 01/11/24 1559   Resp 16 01/11/24 1545   SpO2 96 % 01/11/24 1559   Vitals shown include unfiled device data.        Post Anesthesia Care and Evaluation    Patient location during evaluation: PHASE II  Patient participation: complete - patient participated  Level of consciousness: awake and alert  Pain score: 0  Pain management: adequate    Airway patency: patent  Anesthetic complications: No anesthetic complications  PONV Status: none  Cardiovascular status: acceptable  Respiratory status: acceptable  Hydration status: acceptable

## 2024-01-15 LAB
LAB AP CASE REPORT: NORMAL
PATH REPORT.FINAL DX SPEC: NORMAL
PATH REPORT.GROSS SPEC: NORMAL

## 2024-01-18 LAB
INFLIXIMAB AB SERPL-MCNC: 66 NG/ML
INFLIXIMAB SERPL-MCNC: 12 UG/ML

## 2024-01-22 ENCOUNTER — TELEPHONE (OUTPATIENT)
Dept: INTERNAL MEDICINE | Facility: CLINIC | Age: 74
End: 2024-01-22

## 2024-01-22 NOTE — TELEPHONE ENCOUNTER
Caller: Yelena Rangel    Relationship: Self    Best call back number: [phone]     Which medication are you concerned about:     venlafaxine XR (EFFEXOR-XR) 37.5 MG 24 hr capsule       Who prescribed you this medication: DR ROBERTS     When did you start taking this medication: MONTH AGO     What are your concerns: PATIENT STATES SHE STATED ON THE 75 MG AND WAS SWITCHED TO THE 37.5 MG    PATIENT WANTS TO KNOW IF SHE NEEDS TO STAY ON THE 37.5 MG OR IF SHE NEEDS TO GO BACK TO THE 75 MG    PATIENT STATES IF SHE CONTINUES ON THE 37.5 MG CAN SHE GET A 90 SUPPLY

## 2024-01-24 RX ORDER — VENLAFAXINE HYDROCHLORIDE 37.5 MG/1
37.5 CAPSULE, EXTENDED RELEASE ORAL DAILY
Qty: 90 CAPSULE | Refills: 1 | Status: SHIPPED | OUTPATIENT
Start: 2024-01-24

## 2024-02-07 ENCOUNTER — HOSPITAL ENCOUNTER (OUTPATIENT)
Dept: INFUSION THERAPY | Facility: HOSPITAL | Age: 74
Discharge: HOME | End: 2024-02-07
Admitting: INTERNAL MEDICINE
Payer: MEDICARE

## 2024-02-07 VITALS
RESPIRATION RATE: 15 BRPM | BODY MASS INDEX: 32.37 KG/M2 | HEART RATE: 62 BPM | OXYGEN SATURATION: 96 % | DIASTOLIC BLOOD PRESSURE: 85 MMHG | WEIGHT: 177 LBS | TEMPERATURE: 97.2 F | SYSTOLIC BLOOD PRESSURE: 149 MMHG

## 2024-02-07 DIAGNOSIS — K51.00 ULCERATIVE CHRONIC PANCOLITIS WITHOUT COMPLICATIONS: Primary | ICD-10-CM

## 2024-02-07 DIAGNOSIS — K51.00 ULCERATIVE PANCOLITIS: ICD-10-CM

## 2024-02-07 PROCEDURE — 25010000002 INFLIXIMAB PER 10 MG: Performed by: INTERNAL MEDICINE

## 2024-02-07 PROCEDURE — 25810000003 SODIUM CHLORIDE 0.9 % SOLUTION 250 ML FLEX CONT: Performed by: INTERNAL MEDICINE

## 2024-02-07 PROCEDURE — 96413 CHEMO IV INFUSION 1 HR: CPT

## 2024-02-07 PROCEDURE — 96415 CHEMO IV INFUSION ADDL HR: CPT

## 2024-02-07 RX ADMIN — INFLIXIMAB 800 MG: 100 INJECTION, POWDER, LYOPHILIZED, FOR SOLUTION INTRAVENOUS at 08:56

## 2024-02-07 NOTE — NURSING NOTE
Patient arrived to [facility] at 0750. History and medications reviewed. Medication given as ordered. Patient tolerated well without complications. AVS refused. Patient discharged at 1109 in stable condition without complaints.

## 2024-02-07 NOTE — PATIENT INSTRUCTIONS
"  Call Dr. Dl Saldaña, Gastroenterology at [phone] if you have any problems or concerns.    We know you have a Choice in healthcare and appreciate you using [facility].  Our purpose is to provide you \"Excellent Care\".  We hope that you will always choose us in the future and continue to recommend us to your family and friends.              "

## 2024-03-15 DIAGNOSIS — R06.2 WHEEZING: ICD-10-CM

## 2024-03-15 RX ORDER — ALBUTEROL SULFATE 90 UG/1
2 AEROSOL, METERED RESPIRATORY (INHALATION) EVERY 4 HOURS PRN
Qty: 18 G | Refills: 1 | Status: SHIPPED | OUTPATIENT
Start: 2024-03-15

## 2024-03-15 NOTE — TELEPHONE ENCOUNTER
Rx Refill Note  Requested Prescriptions     Pending Prescriptions Disp Refills    albuterol sulfate  (90 Base) MCG/ACT inhaler 18 g 1     Sig: Inhale 2 puffs Every 4 (Four) Hours As Needed for Wheezing.      Last office visit with prescribing clinician: 11/22/2023   Last telemedicine visit with prescribing clinician: Visit date not found   Next office visit with prescribing clinician: 5/22/2024                         Would you like a call back once the refill request has been completed: [] Yes [] No    If the office needs to give you a call back, can they leave a voicemail: [] Yes [] No    Meghana Fuentes MA  03/15/24, 11:10 EDT

## 2024-03-21 ENCOUNTER — OFFICE VISIT (OUTPATIENT)
Dept: GASTROENTEROLOGY | Facility: CLINIC | Age: 74
End: 2024-03-21
Payer: MEDICARE

## 2024-03-21 VITALS
HEIGHT: 62 IN | SYSTOLIC BLOOD PRESSURE: 124 MMHG | WEIGHT: 182.4 LBS | DIASTOLIC BLOOD PRESSURE: 80 MMHG | BODY MASS INDEX: 33.57 KG/M2

## 2024-03-21 DIAGNOSIS — Z11.59 ENCOUNTER FOR SCREENING FOR OTHER VIRAL DISEASES: ICD-10-CM

## 2024-03-21 DIAGNOSIS — K51.00 ULCERATIVE PANCOLITIS: Primary | ICD-10-CM

## 2024-03-21 DIAGNOSIS — K21.00 GASTROESOPHAGEAL REFLUX DISEASE WITH ESOPHAGITIS, UNSPECIFIED WHETHER HEMORRHAGE: ICD-10-CM

## 2024-03-21 DIAGNOSIS — M05.79 RHEUMATOID ARTHRITIS INVOLVING MULTIPLE SITES WITH POSITIVE RHEUMATOID FACTOR: ICD-10-CM

## 2024-03-21 PROCEDURE — 1159F MED LIST DOCD IN RCRD: CPT | Performed by: INTERNAL MEDICINE

## 2024-03-21 PROCEDURE — 1160F RVW MEDS BY RX/DR IN RCRD: CPT | Performed by: INTERNAL MEDICINE

## 2024-03-21 PROCEDURE — 99213 OFFICE O/P EST LOW 20 MIN: CPT | Performed by: INTERNAL MEDICINE

## 2024-03-21 NOTE — PROGRESS NOTES
PATIENT INFORMATION  Yelena Rangel       - 1950    CHIEF COMPLAINT  Chief Complaint   Patient presents with    Heartburn    Ulcerative pancolitis       HISTORY OF PRESENT ILLNESS  Herfor follow up an was inflmmed but not her colon and her arthritis is better after her last Reicade so is encourged but doesn't want to see Rheumatology now but was nformed when she needs them it yusuf take a whil to get back in        REVIEWED PERTINENT RESULTS/ LABS  Lab Results   Component Value Date    CASEREPORT  2024     Surgical Pathology Report                         Case: [id number]                                  Authorizing Provider:  Dl Saldaña        Collected:           2024 03:28 PM                                 MD Ivana                                                                   Ordering Location:     [facility]   Received:            2024 03:47 PM                                 OR                                                                           Pathologist:           Alejandra Sanders MD                                                    Specimens:   1) - Large Intestine, Sigmoid Colon, BIOPSIES                                                       2) - Large Intestine, Rectum, BIOPSIES                                                     FINALDX  2024     1.  Sigmoid colon, biopsy:   -Benign colonic mucosa without diagnostic abnormality.    2.  Rectum, biopsy:   -Benign, mildly hyperplastic colonic mucosa.   -No evidence of active colitis or dysplasia identified.    [facility]       Lab Results   Component Value Date    HGB 11.2 (L) 2023    MCV 88.0 2023     (H) 2023    ALT 9 2023    AST 13 2023    HGBA1C 6.10 (H) 11/15/2023    INR 1.08 03/10/2020    TRIG 108 11/15/2023    FERRITIN 272.00 (H) 12/15/2023    IRON 27 (L) 2023    TIBC 302 12/15/2023      No results found.    REVIEW OF SYSTEMS  Review of  Systems   Constitutional:  Negative for activity change, chills, fever and unexpected weight change.   HENT:  Negative for congestion.    Eyes:  Negative for visual disturbance.   Respiratory:  Negative for shortness of breath.    Cardiovascular:  Negative for chest pain and palpitations.   Gastrointestinal:  Positive for abdominal distention. Negative for abdominal pain and blood in stool.   Endocrine: Negative for cold intolerance and heat intolerance.   Genitourinary:  Negative for hematuria.   Musculoskeletal:  Negative for gait problem.   Skin:  Negative for color change.   Allergic/Immunologic: Negative for immunocompromised state.   Neurological:  Negative for weakness and light-headedness.   Hematological:  Negative for adenopathy.   Psychiatric/Behavioral:  Negative for sleep disturbance. The patient is not nervous/anxious.          ACTIVE PROBLEMS  Patient Active Problem List    Diagnosis     Ulcerative proctitis without complication [K51.20]     Moderate persistent asthma [J45.40]     Prediabetes [R73.03]     Ulcerative pancolitis [K51.00]     Acute upper respiratory infection [J06.9]     Gastroesophageal reflux disease with esophagitis [K21.00]     GERD (gastroesophageal reflux disease) [K21.9]     Hematuria [R31.9]     Osteoarthritis of knee [M17.9]     Plantar fasciitis [M72.2]     Routine health maintenance [Z00.00]     Rheumatoid arthritis involving multiple sites with positive rheumatoid factor [M05.79]     TIA (transient ischemic attack) [G45.9]     Preop cardiovascular exam [Z01.810]     Leukopenia [D72.819]     Depression [F32.A]     Hyperlipidemia [E78.5]     Hypothyroidism [E03.9]     Age-related osteoporosis without current pathological fracture [M81.0]     WPW (Liseth-Parkinson-White syndrome) [I45.6]     Non-specific colitis [K52.9]     Allergic rhinitis [J30.9]     Primary osteoarthritis of knees, bilateral [M17.0]          PAST MEDICAL HISTORY  Past Medical History:   Diagnosis Date     Anxiety     Arthritis     Arthritis     Asthma     Colon polyp     Cough     Disease of thyroid gland     Environmental allergies     GERD (gastroesophageal reflux disease)     Hyperlipidemia     Hypothyroidism     Osteopenia     Rheumatoid arthritis     Shingles     TIA (transient ischemic attack)     Ulcerative (chronic) rectosigmoiditis with rectal bleeding     Ulcerative colitis     WPW (Liseth-Parkinson-White syndrome)          SURGICAL HISTORY  Past Surgical History:   Procedure Laterality Date    COLONOSCOPY N/A 05/13/2016    Procedure: COLONOSCOPY with biopsies;  Surgeon: Dl Saldaña MD;  Location: [facility] OR;  Service:     COLONOSCOPY N/A 11/23/2022    Procedure: COLONOSCOPY WITH BIOPSIES, POLYPECTOMY;  Surgeon: Dl Saldaña MD;  Location: [facility] OR;  Service: Gastroenterology;  Laterality: N/A;  Diverticulosis  Biopsies: right colon, transverse, sigmoid, and rectal biopsies  Sigmoid colon polyp at 20cm    COLONOSCOPY W/ POLYPECTOMY      HYSTERECTOMY      REPLACEMENT TOTAL KNEE Left 10/09/2018    [facility], Dr. Simon Barber, surgeon    SIGMOIDOSCOPY N/A 12/13/2017    Procedure: SIGMOIDOSCOPY FLEXIBLE with biopsy;  Surgeon: Dl Saldaña MD;  Location: [facility] OR;  Service:     SIGMOIDOSCOPY N/A 1/11/2024    Procedure: SIGMOIDOSCOPY FLEXIBLE;  Surgeon: lD Saldaña MD;  Location: [facility] OR;  Service: Gastroenterology;  Laterality: N/A;  ULCERATIVE COLITIS  DIVERTICULOSIS  sigmoid bx  rectal bx    THYROID LOBECTOMY Right     TOTAL KNEE ARTHROPLASTY Right 11/20/2018         FAMILY HISTORY  Family History   Problem Relation Age of Onset    Breast cancer Mother     Breast cancer Sister     Colon cancer Father     Colon cancer Brother          SOCIAL HISTORY  Social History     Occupational History    Not on file   Tobacco Use    Smoking status: Never     Passive exposure: Never    Smokeless tobacco: Never   Vaping Use    Vaping status: Never Used    Substance and Sexual Activity    Alcohol use: Yes     Alcohol/week: 3.0 standard drinks of alcohol     Types: 3 Cans of beer per week     Comment: twice week//caffeine yes    Drug use: No    Sexual activity: Defer         CURRENT MEDICATIONS    Current Outpatient Medications:     Acetaminophen (TYLENOL ARTHRITIS PAIN PO), Take 1 tablet by mouth 3 (Three) Times a Day. AS NEEDED FOR ARTHRITIS, Disp: , Rfl:     albuterol sulfate  (90 Base) MCG/ACT inhaler, Inhale 2 puffs Every 4 (Four) Hours As Needed for Wheezing., Disp: 18 g, Rfl: 1    aspirin 81 MG chewable tablet, Chew 1 tablet Daily., Disp: , Rfl:     Calcium Carb-Cholecalciferol (CALCIUM PLUS VITAMIN D3 PO), Take 1,400 mg by mouth Daily. D3 = 1500 IU, Disp: , Rfl:     cetirizine (ZyrTEC) 10 MG tablet, Take 1 tablet by mouth Daily., Disp: , Rfl:     denosumab (PROLIA) 60 MG/ML solution prefilled syringe syringe, Inject  under the skin into the appropriate area as directed 1 (One) Time., Disp: , Rfl:     Fluticasone-Salmeterol (ADVAIR/WIXELA) 250-50 MCG/ACT DISKUS, Inhale 1 puff 2 (Two) Times a Day., Disp: 3 each, Rfl: 1    InFLIXimab (REMICADE IV), Infuse 1 dose into a venous catheter Every 30 (Thirty) Days. Every 60 days.  Dr. Saldaña., Disp: , Rfl:     levothyroxine (SYNTHROID, LEVOTHROID) 100 MCG tablet, Take 1 tablet by mouth Daily., Disp: 90 tablet, Rfl: 1    montelukast (Singulair) 10 MG tablet, Take 1 tablet by mouth Every Night., Disp: 90 tablet, Rfl: 1    Multiple Vitamins-Minerals (MULTIVITAMIN WITH MINERALS) tablet tablet, Take 1 tablet by mouth Daily., Disp: , Rfl:     omeprazole (priLOSEC) 20 MG capsule, TAKE 1 CAPSULE BY MOUTH DAILY, Disp: 90 capsule, Rfl: 1    Probiotic Product (PROBIOTIC DAILY PO), Take 2 capsules by mouth Daily. OTC Probiotic, Disp: , Rfl:     venlafaxine XR (EFFEXOR-XR) 37.5 MG 24 hr capsule, Take 1 capsule by mouth Daily., Disp: 90 capsule, Rfl: 1    ALLERGIES  Sulfa antibiotics    VITALS  Vitals:    03/21/24 0851  "  BP: 124/80   BP Location: Left arm   Patient Position: Sitting   Cuff Size: Adult   Weight: 82.7 kg (182 lb 6.4 oz)   Height: 157.5 cm (62\")       PHYSICAL EXAM  Debilities/Disabilities Identified: None  Emotional Behavior: Appropriate  Wt Readings from Last 3 Encounters:   03/21/24 82.7 kg (182 lb 6.4 oz)   02/07/24 80.3 kg (177 lb)   01/11/24 79.7 kg (175 lb 9.6 oz)     Ht Readings from Last 1 Encounters:   03/21/24 157.5 cm (62\")     Body mass index is 33.36 kg/m².  Physical Exam  Constitutional:       Appearance: She is well-developed. She is not diaphoretic.   HENT:      Head: Normocephalic and atraumatic.   Eyes:      General: No scleral icterus.     Conjunctiva/sclera: Conjunctivae normal.      Pupils: Pupils are equal, round, and reactive to light.   Neck:      Thyroid: No thyromegaly.   Cardiovascular:      Rate and Rhythm: Normal rate and regular rhythm.      Heart sounds: Normal heart sounds. No murmur heard.     No gallop.   Pulmonary:      Effort: Pulmonary effort is normal.      Breath sounds: Normal breath sounds. No wheezing or rales.   Abdominal:      General: Bowel sounds are normal. There is no distension or abdominal bruit.      Palpations: Abdomen is soft. There is no shifting dullness, fluid wave or mass.      Tenderness: There is no abdominal tenderness. There is no guarding. Negative signs include Sorto's sign.      Hernia: There is no hernia in the ventral area.   Musculoskeletal:         General: Normal range of motion.      Cervical back: Normal range of motion and neck supple.   Lymphadenopathy:      Cervical: No cervical adenopathy.   Skin:     General: Skin is warm and dry.      Findings: No erythema or rash.   Neurological:      Mental Status: She is alert and oriented to person, place, and time.   Psychiatric:         Mood and Affect: Mood normal.         Behavior: Behavior normal.         CLINICAL DATA REVIEWED   reviewed previous lab results and integrated with today's visit, " reviewed notes from other physicians and/or last GI encounter, reviewed previous endoscopy results and available photos, reviewed surgical pathology results from previous biopsies    ASSESSMENT  Diagnoses and all orders for this visit:    Ulcerative pancolitis  -     QuantiFERON TB Gold; Future  -     HBV Core Antibody, IgG / IgM Diff; Future    Gastroesophageal reflux disease with esophagitis, unspecified whether hemorrhage    Rheumatoid arthritis involving multiple sites with positive rheumatoid factor    Encounter for screening for other viral diseases  -     HBV Core Antibody, IgG / IgM Diff; Future          PLAN  Return in about 6 months (around 9/21/2024).    I have discussed the above plan with the patient.  They verbalize understanding and are in agreement with the plan.  They have been advised to contact the office for any questions, concerns, or changes related to their health.

## 2024-04-03 ENCOUNTER — HOSPITAL ENCOUNTER (OUTPATIENT)
Dept: INFUSION THERAPY | Facility: HOSPITAL | Age: 74
Discharge: HOME | End: 2024-04-03
Admitting: INTERNAL MEDICINE
Payer: MEDICARE

## 2024-04-03 VITALS
DIASTOLIC BLOOD PRESSURE: 92 MMHG | SYSTOLIC BLOOD PRESSURE: 153 MMHG | OXYGEN SATURATION: 97 % | BODY MASS INDEX: 33.18 KG/M2 | TEMPERATURE: 96.3 F | WEIGHT: 181.4 LBS | HEART RATE: 66 BPM | RESPIRATION RATE: 16 BRPM

## 2024-04-03 DIAGNOSIS — K51.00 ULCERATIVE PANCOLITIS: Primary | ICD-10-CM

## 2024-04-03 PROCEDURE — 25010000002 INFLIXIMAB PER 10 MG: Performed by: INTERNAL MEDICINE

## 2024-04-03 PROCEDURE — 96413 CHEMO IV INFUSION 1 HR: CPT

## 2024-04-03 PROCEDURE — 25810000003 SODIUM CHLORIDE 0.9 % SOLUTION 250 ML FLEX CONT: Performed by: INTERNAL MEDICINE

## 2024-04-03 PROCEDURE — 96415 CHEMO IV INFUSION ADDL HR: CPT

## 2024-04-03 RX ADMIN — INFLIXIMAB 800 MG: 100 INJECTION, POWDER, LYOPHILIZED, FOR SOLUTION INTRAVENOUS at 08:26

## 2024-04-03 NOTE — NURSING NOTE
PT ARRIVED TO [facility] FOR APPT. VSS, NO COMPLAINTS AT THIS TIME. MEDICATION ADMINISTERED PER MD ORDER. PT TOLERATED WELL. SCHEDULED NEXT APPT. OFFERED AVS, PT DECLINED. PT DISCHARGED FROM [facility] AT 10:40 AM IN STABLE CONDITION, WITHOUT COMPLAINTS.

## 2024-04-03 NOTE — PATIENT INSTRUCTIONS
"  Call Dr. Dl Saldaña, Gastroenterology at [phone] if you have any problems or concerns.    We know you have a Choice in healthcare and appreciate you using [facility].  Our purpose is to provide you \"Excellent Care\".  We hope that you will always choose us in the future and continue to recommend us to your family and friends.              "

## 2024-04-29 DIAGNOSIS — R06.2 WHEEZING: ICD-10-CM

## 2024-04-29 DIAGNOSIS — J40 BRONCHITIS: ICD-10-CM

## 2024-04-29 RX ORDER — MONTELUKAST SODIUM 10 MG/1
10 TABLET ORAL NIGHTLY
Qty: 90 TABLET | Refills: 1 | Status: SHIPPED | OUTPATIENT
Start: 2024-04-29

## 2024-04-29 RX ORDER — FLUTICASONE PROPIONATE AND SALMETEROL 250; 50 UG/1; UG/1
1 POWDER RESPIRATORY (INHALATION)
Qty: 3 EACH | Refills: 1 | Status: SHIPPED | OUTPATIENT
Start: 2024-04-29

## 2024-04-29 NOTE — TELEPHONE ENCOUNTER
Caller: Yelena Rangel    Relationship: Self    Best call back number: [phone]    What medication are you requesting:     Fluticasone-Salmeterol (ADVAIR/WIXELA) 250-50 MCG/ACT DISKUS   montelukast (Singulair) 10 MG tablet       If a prescription is needed, what is your preferred pharmacy and phone number: [id number] [place] [facility] [address] - [phone] [facility] [phone] FX     Additional notes: PATIENT REQUESTS 90 DAY SUPPLY

## 2024-04-29 NOTE — TELEPHONE ENCOUNTER
Rx Refill Note  Requested Prescriptions     Pending Prescriptions Disp Refills    Fluticasone-Salmeterol (ADVAIR/WIXELA) 250-50 MCG/ACT DISKUS 3 each 1     Sig: Inhale 1 puff 2 (Two) Times a Day.    montelukast (Singulair) 10 MG tablet 90 tablet 1     Sig: Take 1 tablet by mouth Every Night.      Last office visit with prescribing clinician: 11/22/2023   Last telemedicine visit with prescribing clinician: Visit date not found   Next office visit with prescribing clinician: 5/22/2024                         Would you like a call back once the refill request has been completed: [] Yes [] No    If the office needs to give you a call back, can they leave a voicemail: [] Yes [] No    Jahaira Osborne CMA  04/29/24, 14:37 EDT

## 2024-05-01 ENCOUNTER — TELEPHONE (OUTPATIENT)
Dept: INTERNAL MEDICINE | Facility: CLINIC | Age: 74
End: 2024-05-01
Payer: MEDICARE

## 2024-05-01 DIAGNOSIS — R73.03 PREDIABETES: ICD-10-CM

## 2024-05-01 DIAGNOSIS — E03.9 HYPOTHYROIDISM, UNSPECIFIED TYPE: ICD-10-CM

## 2024-05-01 DIAGNOSIS — E78.5 HYPERLIPIDEMIA, UNSPECIFIED HYPERLIPIDEMIA TYPE: Primary | ICD-10-CM

## 2024-05-01 DIAGNOSIS — E55.9 HYPOVITAMINOSIS D: ICD-10-CM

## 2024-05-01 NOTE — TELEPHONE ENCOUNTER
Pt is coming in for labs 5/16/2024. Should we use orders from Dec or will pt need additional orders?

## 2024-05-14 ENCOUNTER — TELEPHONE (OUTPATIENT)
Dept: INTERNAL MEDICINE | Facility: CLINIC | Age: 74
End: 2024-05-14

## 2024-05-14 DIAGNOSIS — K21.00 GASTROESOPHAGEAL REFLUX DISEASE WITH ESOPHAGITIS WITHOUT HEMORRHAGE: ICD-10-CM

## 2024-05-14 RX ORDER — OMEPRAZOLE 20 MG/1
20 CAPSULE, DELAYED RELEASE ORAL DAILY
Qty: 90 CAPSULE | Refills: 1 | OUTPATIENT
Start: 2024-05-14

## 2024-05-15 ENCOUNTER — LAB (OUTPATIENT)
Dept: LAB | Facility: HOSPITAL | Age: 74
End: 2024-05-15
Payer: MEDICARE

## 2024-05-15 DIAGNOSIS — K51.00 ULCERATIVE PANCOLITIS: ICD-10-CM

## 2024-05-15 DIAGNOSIS — Z11.59 ENCOUNTER FOR SCREENING FOR OTHER VIRAL DISEASES: ICD-10-CM

## 2024-05-15 DIAGNOSIS — M81.0 AGE-RELATED OSTEOPOROSIS WITHOUT CURRENT PATHOLOGICAL FRACTURE: ICD-10-CM

## 2024-05-15 DIAGNOSIS — E03.9 HYPOTHYROIDISM, UNSPECIFIED TYPE: ICD-10-CM

## 2024-05-15 DIAGNOSIS — K51.00 ULCERATIVE PANCOLITIS: Primary | ICD-10-CM

## 2024-05-15 LAB
MAGNESIUM SERPL-MCNC: 2 MG/DL (ref 1.6–2.4)
PHOSPHATE SERPL-MCNC: 4.1 MG/DL (ref 2.5–4.5)

## 2024-05-15 PROCEDURE — 86480 TB TEST CELL IMMUN MEASURE: CPT

## 2024-05-15 PROCEDURE — 86704 HEP B CORE ANTIBODY TOTAL: CPT

## 2024-05-15 PROCEDURE — 84100 ASSAY OF PHOSPHORUS: CPT

## 2024-05-15 PROCEDURE — 82607 VITAMIN B-12: CPT | Performed by: FAMILY MEDICINE

## 2024-05-15 PROCEDURE — 86705 HEP B CORE ANTIBODY IGM: CPT

## 2024-05-15 PROCEDURE — 84439 ASSAY OF FREE THYROXINE: CPT | Performed by: FAMILY MEDICINE

## 2024-05-15 PROCEDURE — 82306 VITAMIN D 25 HYDROXY: CPT | Performed by: FAMILY MEDICINE

## 2024-05-15 PROCEDURE — 80053 COMPREHEN METABOLIC PANEL: CPT | Performed by: FAMILY MEDICINE

## 2024-05-15 PROCEDURE — 85025 COMPLETE CBC W/AUTO DIFF WBC: CPT | Performed by: FAMILY MEDICINE

## 2024-05-15 PROCEDURE — 84443 ASSAY THYROID STIM HORMONE: CPT | Performed by: FAMILY MEDICINE

## 2024-05-15 PROCEDURE — 83735 ASSAY OF MAGNESIUM: CPT

## 2024-05-15 PROCEDURE — 36415 COLL VENOUS BLD VENIPUNCTURE: CPT

## 2024-05-15 PROCEDURE — 83036 HEMOGLOBIN GLYCOSYLATED A1C: CPT | Performed by: FAMILY MEDICINE

## 2024-05-15 PROCEDURE — 80061 LIPID PANEL: CPT | Performed by: FAMILY MEDICINE

## 2024-05-15 RX ORDER — LEVOTHYROXINE SODIUM 0.1 MG/1
100 TABLET ORAL DAILY
Qty: 90 TABLET | Refills: 1 | Status: SHIPPED | OUTPATIENT
Start: 2024-05-15

## 2024-05-15 NOTE — TELEPHONE ENCOUNTER
Rx Refill Note  Requested Prescriptions     Pending Prescriptions Disp Refills    levothyroxine (SYNTHROID, LEVOTHROID) 100 MCG tablet 90 tablet 1     Sig: Take 1 tablet by mouth Daily.      Last office visit with prescribing clinician: 11/22/2023   Last telemedicine visit with prescribing clinician: Visit date not found   Next office visit with prescribing clinician: 5/22/2024                         Would you like a call back once the refill request has been completed: [] Yes [] No    If the office needs to give you a call back, can they leave a voicemail: [] Yes [] No    Jahaira Osborne CMA  05/15/24, 13:37 EDT

## 2024-05-15 NOTE — TELEPHONE ENCOUNTER
Rx Refill Note  Requested Prescriptions     Pending Prescriptions Disp Refills    levothyroxine (SYNTHROID, LEVOTHROID) 100 MCG tablet 90 tablet 1     Sig: Take 1 tablet by mouth Daily.      Last office visit with prescribing clinician: 11/22/2023   Last telemedicine visit with prescribing clinician: Visit date not found   Next office visit with prescribing clinician: 5/22/2024                         Would you like a call back once the refill request has been completed: [] Yes [] No    If the office needs to give you a call back, can they leave a voicemail: [] Yes [] No    Daniel Monson, PCT  05/15/24, 11:03 EDT

## 2024-05-16 LAB
HBV CORE AB SERPL QL IA: NEGATIVE
HBV CORE IGM SERPL QL IA: NEGATIVE

## 2024-05-17 LAB
GAMMA INTERFERON BACKGROUND BLD IA-ACNC: 0.12 IU/ML
M TB IFN-G BLD-IMP: NEGATIVE
M TB IFN-G CD4+ BCKGRND COR BLD-ACNC: 0.16 IU/ML
M TB IFN-G CD4+CD8+ BCKGRND COR BLD-ACNC: 0.15 IU/ML
MITOGEN IGNF BCKGRD COR BLD-ACNC: >10 IU/ML
QUANTIFERON INCUBATION: NORMAL
SERVICE CMNT-IMP: NORMAL

## 2024-05-22 ENCOUNTER — OFFICE VISIT (OUTPATIENT)
Dept: INTERNAL MEDICINE | Facility: CLINIC | Age: 74
End: 2024-05-22
Payer: MEDICARE

## 2024-05-22 VITALS
OXYGEN SATURATION: 94 % | SYSTOLIC BLOOD PRESSURE: 122 MMHG | DIASTOLIC BLOOD PRESSURE: 78 MMHG | HEART RATE: 74 BPM | WEIGHT: 185.8 LBS | BODY MASS INDEX: 34.19 KG/M2 | TEMPERATURE: 97.8 F | HEIGHT: 62 IN

## 2024-05-22 DIAGNOSIS — Z00.00 ROUTINE HEALTH MAINTENANCE: ICD-10-CM

## 2024-05-22 DIAGNOSIS — E78.5 HYPERLIPIDEMIA, UNSPECIFIED HYPERLIPIDEMIA TYPE: Primary | ICD-10-CM

## 2024-05-22 DIAGNOSIS — I45.6 WPW (WOLFF-PARKINSON-WHITE SYNDROME): ICD-10-CM

## 2024-05-22 DIAGNOSIS — F32.A DEPRESSION, UNSPECIFIED DEPRESSION TYPE: ICD-10-CM

## 2024-05-22 DIAGNOSIS — M05.79 RHEUMATOID ARTHRITIS INVOLVING MULTIPLE SITES WITH POSITIVE RHEUMATOID FACTOR: ICD-10-CM

## 2024-05-22 DIAGNOSIS — G45.9 TIA (TRANSIENT ISCHEMIC ATTACK): ICD-10-CM

## 2024-05-22 DIAGNOSIS — K51.00 ULCERATIVE PANCOLITIS: ICD-10-CM

## 2024-05-22 DIAGNOSIS — R73.03 PREDIABETES: ICD-10-CM

## 2024-05-22 DIAGNOSIS — E03.9 HYPOTHYROIDISM, UNSPECIFIED TYPE: ICD-10-CM

## 2024-05-22 DIAGNOSIS — E55.9 HYPOVITAMINOSIS D: ICD-10-CM

## 2024-05-22 DIAGNOSIS — M81.0 AGE-RELATED OSTEOPOROSIS WITHOUT CURRENT PATHOLOGICAL FRACTURE: ICD-10-CM

## 2024-05-22 DIAGNOSIS — J30.89 NON-SEASONAL ALLERGIC RHINITIS, UNSPECIFIED TRIGGER: ICD-10-CM

## 2024-05-22 DIAGNOSIS — J45.40 MODERATE PERSISTENT ASTHMA WITHOUT COMPLICATION: ICD-10-CM

## 2024-05-22 PROCEDURE — 1160F RVW MEDS BY RX/DR IN RCRD: CPT | Performed by: FAMILY MEDICINE

## 2024-05-22 PROCEDURE — 1159F MED LIST DOCD IN RCRD: CPT | Performed by: FAMILY MEDICINE

## 2024-05-22 PROCEDURE — 1126F AMNT PAIN NOTED NONE PRSNT: CPT | Performed by: FAMILY MEDICINE

## 2024-05-22 PROCEDURE — 99214 OFFICE O/P EST MOD 30 MIN: CPT | Performed by: FAMILY MEDICINE

## 2024-05-22 RX ORDER — PRAVASTATIN SODIUM 10 MG
10 TABLET ORAL DAILY
COMMUNITY
End: 2024-05-22 | Stop reason: SDUPTHER

## 2024-05-22 RX ORDER — PRAVASTATIN SODIUM 10 MG
10 TABLET ORAL DAILY
Qty: 90 TABLET | Refills: 1 | Status: SHIPPED | OUTPATIENT
Start: 2024-05-22

## 2024-05-22 NOTE — PROGRESS NOTES
Subjective     Yelena Rangel is a 73 y.o. female, who presents with a chief complaint of   Chief Complaint   Patient presents with    Hyperlipidemia     F/U    Hypothyroidism     F/U    Anemia     F/U       Hyperlipidemia  Exacerbating diseases include hypothyroidism.   Hypothyroidism  Pertinent negatives include no arthralgias.   Osteoporosis  Pertinent negatives include no arthralgias.   Depression  Anemia  Past medical history includes hypothyroidism.     1. Hyperlipidemia.  Stopped pravastatin last time due to myalgias.  Had similar side effects with rosuvastatin.    2. Ulcerative colitis.  On Remicade every 2 months per Dr. Saldaña.  She has occasional flares.  Colonoscopy done 12/2022.    3. Depression.  Pt reports she is still doing well with venlafaxine.    4. Rheumatoid arthritis. This has been well-controlled with the Remicade.    The following portions of the patient's history were reviewed and updated as appropriate: allergies, current medications, past family history, past medical history, past social history, past surgical history and problem list.    Allergies: Sulfa antibiotics    Review of Systems   Constitutional: Negative.    HENT: Negative.     Eyes: Negative.    Respiratory: Negative.     Cardiovascular: Negative.    Gastrointestinal: Negative.    Endocrine: Negative.    Genitourinary: Negative.    Musculoskeletal: Negative.  Negative for arthralgias.   Skin: Negative.    Allergic/Immunologic: Positive for environmental allergies.   Neurological: Negative.    Hematological: Negative.    Psychiatric/Behavioral: Negative.         Objective     Wt Readings from Last 3 Encounters:   05/22/24 84.3 kg (185 lb 12.8 oz)   04/03/24 82.3 kg (181 lb 6.4 oz)   03/21/24 82.7 kg (182 lb 6.4 oz)     Temp Readings from Last 3 Encounters:   05/22/24 97.8 °F (36.6 °C) (Infrared)   04/03/24 96.3 °F (35.7 °C)   02/07/24 97.2 °F (36.2 °C) (Temporal)     BP Readings from Last 3 Encounters:   05/22/24 122/78    04/03/24 153/92   03/21/24 124/80     Pulse Readings from Last 3 Encounters:   05/22/24 74   04/03/24 66   02/07/24 62     Body mass index is 33.98 kg/m².  SpO2 Readings from Last 3 Encounters:   09/27/17 98%   03/29/17 98%   09/28/16 97%       Physical Exam   Constitutional: She is oriented to person, place, and time. She appears well-developed.   HENT:   Head: Normocephalic and atraumatic.   Mouth/Throat: Mucous membranes are moist.   Eyes: Conjunctivae are normal.   Neck: No thyromegaly present.   Cardiovascular: Normal rate, regular rhythm and normal heart sounds.   Pulmonary/Chest: Effort normal and breath sounds normal. No respiratory distress.   Abdominal: Soft. Normal appearance. There is no abdominal tenderness.   Musculoskeletal:      Right lower leg: No edema.      Left lower leg: No edema.   Neurological: She is alert and oriented to person, place, and time.   Skin: Skin is warm and dry.   Psychiatric: Her behavior is normal. Mood normal.   Nursing note and vitals reviewed.      Results for orders placed or performed in visit on 05/15/24   HBV Core Antibody, IgG / IgM Diff    Specimen: Blood   Result Value Ref Range    Hep B Core IgM Negative Negative    Hep B Core Total Ab Negative Negative       Assessment/Plan   Diagnoses and all orders for this visit:    1. Hyperlipidemia, unspecified hyperlipidemia type (Primary)  -     Comprehensive Metabolic Panel; Future  -     Lipid Panel With / Chol / HDL Ratio; Future    2. Hypothyroidism, unspecified type  -     TSH; Future  -     CBC Auto Differential; Future  -     T4, Free; Future    3. Depression, unspecified depression type    4. Age-related osteoporosis without current pathological fracture    5. Ulcerative pancolitis    6. WPW (Liseth-Parkinson-White syndrome)    7. Rheumatoid arthritis involving multiple sites with positive rheumatoid factor    8. TIA (transient ischemic attack)    9. Prediabetes  -     Hemoglobin A1c; Future  -     Vitamin B12;  Future    10. Routine health maintenance    11. Hypovitaminosis D  -     Vitamin D,25-Hydroxy; Future    12. Moderate persistent asthma without complication    13. Non-seasonal allergic rhinitis, unspecified trigger    Other orders  -     pravastatin (PRAVACHOL) 10 MG tablet; Take 1 tablet by mouth Daily.  Dispense: 90 tablet; Refill: 1    1. Hyperlipidemia.  Not tolerating pravastatin.  Hold for now and continue lifestyle measures.    2. Hypothyroidism.  Adequately replaced.  Labs reviewed.    3. Depression.  Controlled.  Continue venlafaxine and lifestyle measures.     4. Osteoporosis.  Continue Prolia.  Dexa scan done 12/2023 showed improvement in bone density in the spine.    5. Ulcerative colitis.  Continue per Dr. Saldaña.  On Remicade.    6. Liseth-Parkinson-White.  She saw cardiology before her knee replacements.    7. RA.  Has been controlled with Remicade.  8. TIA.  Continue ASA 81 mg and pravstatin.    9. Prediabetes.  A1c 5.4, down form 6.1.  Continue lifestyle measures.    10. Routine health maint.  Mammogram done 12/2023 and is reordered.  Covid-19 vaccine and RSV vaccines declined.  Had both doses of Shingrix at pharmacy.  Prevnar 20 UTD.  Flu vaccine today.    11.  Hyponatremia.  Waxes and wanes.  ?Venlafaxine.      12. Asthma.  Controlled with Advair.    13. Perennial allergies.  Continue antihistamine and montelukast.      Outpatient Medications Prior to Visit   Medication Sig Dispense Refill    Acetaminophen (TYLENOL ARTHRITIS PAIN PO) Take 1 tablet by mouth 3 (Three) Times a Day. AS NEEDED FOR ARTHRITIS      albuterol sulfate  (90 Base) MCG/ACT inhaler Inhale 2 puffs Every 4 (Four) Hours As Needed for Wheezing. 18 g 1    aspirin 81 MG chewable tablet Chew 1 tablet Daily.      Calcium Carb-Cholecalciferol (CALCIUM PLUS VITAMIN D3 PO) Take 1,400 mg by mouth Daily. D3 = 1500 IU      cetirizine (ZyrTEC) 10 MG tablet Take 1 tablet by mouth Daily.      denosumab (PROLIA) 60 MG/ML solution  prefilled syringe syringe Inject  under the skin into the appropriate area as directed 1 (One) Time.      Fluticasone-Salmeterol (ADVAIR/WIXELA) 250-50 MCG/ACT DISKUS Inhale 1 puff 2 (Two) Times a Day. 3 each 1    InFLIXimab (REMICADE IV) Infuse 1 dose into a venous catheter Every 30 (Thirty) Days. Every 60 days.  Dr. Saldaña.      levothyroxine (SYNTHROID, LEVOTHROID) 100 MCG tablet Take 1 tablet by mouth Daily. 90 tablet 1    montelukast (Singulair) 10 MG tablet Take 1 tablet by mouth Every Night. 90 tablet 1    Multiple Vitamins-Minerals (MULTIVITAMIN WITH MINERALS) tablet tablet Take 1 tablet by mouth Daily.      omeprazole (priLOSEC) 20 MG capsule TAKE 1 CAPSULE BY MOUTH DAILY 90 capsule 1    Probiotic Product (PROBIOTIC DAILY PO) Take 2 capsules by mouth Daily. OTC Probiotic      venlafaxine XR (EFFEXOR-XR) 37.5 MG 24 hr capsule Take 1 capsule by mouth Daily. 90 capsule 1    pravastatin (PRAVACHOL) 10 MG tablet Take 1 tablet by mouth Daily.       No facility-administered medications prior to visit.     New Medications Ordered This Visit   Medications    pravastatin (PRAVACHOL) 10 MG tablet     Sig: Take 1 tablet by mouth Daily.     Dispense:  90 tablet     Refill:  1     [unfilled]  Medications Discontinued During This Encounter   Medication Reason    pravastatin (PRAVACHOL) 10 MG tablet Reorder       Return in about 6 months (around 11/22/2024) for Medicare Wellness.

## 2024-05-24 DIAGNOSIS — K21.00 GASTROESOPHAGEAL REFLUX DISEASE WITH ESOPHAGITIS WITHOUT HEMORRHAGE: ICD-10-CM

## 2024-05-24 RX ORDER — OMEPRAZOLE 20 MG/1
20 CAPSULE, DELAYED RELEASE ORAL DAILY
Qty: 90 CAPSULE | Refills: 1 | OUTPATIENT
Start: 2024-05-24

## 2024-05-28 DIAGNOSIS — K21.00 GASTROESOPHAGEAL REFLUX DISEASE WITH ESOPHAGITIS WITHOUT HEMORRHAGE: ICD-10-CM

## 2024-05-28 NOTE — TELEPHONE ENCOUNTER
Med refill denied on 5/24 but can't see reason. Resending to pcp.    Rx Refill Note  Requested Prescriptions     Pending Prescriptions Disp Refills    omeprazole (priLOSEC) 20 MG capsule 90 capsule 1     Sig: Take 1 capsule by mouth Daily.      Last office visit with prescribing clinician: 5/22/2024   Last telemedicine visit with prescribing clinician: Visit date not found   Next office visit with prescribing clinician: 11/25/2024                         Would you like a call back once the refill request has been completed: [] Yes [] No    If the office needs to give you a call back, can they leave a voicemail: [] Yes [] No    Meghana Fuentes MA  05/28/24, 16:07 EDT

## 2024-05-29 ENCOUNTER — HOSPITAL ENCOUNTER (OUTPATIENT)
Dept: INFUSION THERAPY | Facility: HOSPITAL | Age: 74
Discharge: HOME | End: 2024-05-29
Payer: MEDICARE

## 2024-05-29 VITALS
SYSTOLIC BLOOD PRESSURE: 134 MMHG | BODY MASS INDEX: 33.47 KG/M2 | RESPIRATION RATE: 16 BRPM | TEMPERATURE: 97.6 F | HEART RATE: 68 BPM | DIASTOLIC BLOOD PRESSURE: 82 MMHG | OXYGEN SATURATION: 95 % | WEIGHT: 183 LBS

## 2024-05-29 DIAGNOSIS — K51.00 ULCERATIVE PANCOLITIS: Primary | ICD-10-CM

## 2024-05-29 PROCEDURE — 25810000003 SODIUM CHLORIDE 0.9 % SOLUTION 250 ML FLEX CONT: Performed by: INTERNAL MEDICINE

## 2024-05-29 PROCEDURE — 96413 CHEMO IV INFUSION 1 HR: CPT

## 2024-05-29 PROCEDURE — 96415 CHEMO IV INFUSION ADDL HR: CPT

## 2024-05-29 PROCEDURE — 25010000002 INFLIXIMAB PER 10 MG: Performed by: INTERNAL MEDICINE

## 2024-05-29 RX ORDER — OMEPRAZOLE 20 MG/1
20 CAPSULE, DELAYED RELEASE ORAL DAILY
Qty: 90 CAPSULE | Refills: 1 | Status: SHIPPED | OUTPATIENT
Start: 2024-05-29

## 2024-05-29 RX ADMIN — INFLIXIMAB 800 MG: 100 INJECTION, POWDER, LYOPHILIZED, FOR SOLUTION INTRAVENOUS at 08:28

## 2024-05-29 NOTE — NURSING NOTE
PT ARRIVED TO [facility] FOR APPT. VSS, NO COMPLAINTS AT THIS TIME. MEDICATION ADMINISTERED PER MD ORDER. PT TOLERATED WELL. VSS POST INFUSION. SCHEDULED NEXT APPT. AVS PRINTED OUT, COPY GIVEN TO PT. PT DISCHARGED FROM [facility] AT 10:58 AM IN STABLE CONDITION, WITHOUT COMPLAINTS.

## 2024-05-29 NOTE — PATIENT INSTRUCTIONS
"  Call Dr. Dl Saldaña, Gastroenterology at [phone] if you have any problems or concerns.    We know you have a Choice in healthcare and appreciate you using [facility].  Our purpose is to provide you \"Excellent Care\".  We hope that you will always choose us in the future and continue to recommend us to your family and friends.             "

## 2024-07-01 ENCOUNTER — HOSPITAL ENCOUNTER (OUTPATIENT)
Dept: INFUSION THERAPY | Facility: HOSPITAL | Age: 74
Discharge: HOME | End: 2024-07-01
Admitting: FAMILY MEDICINE
Payer: MEDICARE

## 2024-07-01 VITALS
HEART RATE: 73 BPM | SYSTOLIC BLOOD PRESSURE: 127 MMHG | DIASTOLIC BLOOD PRESSURE: 82 MMHG | RESPIRATION RATE: 18 BRPM | OXYGEN SATURATION: 96 % | TEMPERATURE: 96.2 F

## 2024-07-01 DIAGNOSIS — M81.0 AGE-RELATED OSTEOPOROSIS WITHOUT CURRENT PATHOLOGICAL FRACTURE: Primary | ICD-10-CM

## 2024-07-01 PROCEDURE — 96372 THER/PROPH/DIAG INJ SC/IM: CPT

## 2024-07-01 PROCEDURE — 25010000002 DENOSUMAB 60 MG/ML SOLUTION PREFILLED SYRINGE: Performed by: FAMILY MEDICINE

## 2024-07-01 RX ADMIN — DENOSUMAB 60 MG: 60 INJECTION SUBCUTANEOUS at 10:39

## 2024-07-01 NOTE — NURSING NOTE
Pt arrived to [facility] for appt. VSS, no complaints at this time. Medication administered per MD order. Pt tolerated well. AVS offered, pt refused. PT discharged from [facility] at 10:46 AM in stable condition, without complaints.

## 2024-07-01 NOTE — PATIENT INSTRUCTIONS
"  Call [facility] at [phone] if you have any problems or concerns.    We know you have a Choice in healthcare and appreciate you using [facility].  Our purpose is to provide you \"Excellent Care\".  We hope that you will always choose us in the future and continue to recommend us to your family and friends.                "

## 2024-07-24 ENCOUNTER — HOSPITAL ENCOUNTER (OUTPATIENT)
Dept: INFUSION THERAPY | Facility: HOSPITAL | Age: 74
Discharge: HOME | End: 2024-07-24
Admitting: INTERNAL MEDICINE
Payer: MEDICARE

## 2024-07-24 VITALS
BODY MASS INDEX: 33.84 KG/M2 | TEMPERATURE: 96.1 F | RESPIRATION RATE: 16 BRPM | DIASTOLIC BLOOD PRESSURE: 84 MMHG | HEART RATE: 65 BPM | SYSTOLIC BLOOD PRESSURE: 132 MMHG | OXYGEN SATURATION: 95 % | WEIGHT: 185 LBS

## 2024-07-24 DIAGNOSIS — K51.00 ULCERATIVE PANCOLITIS: Primary | ICD-10-CM

## 2024-07-24 PROCEDURE — 25010000002 INFLIXIMAB PER 10 MG: Performed by: INTERNAL MEDICINE

## 2024-07-24 PROCEDURE — 25810000003 SODIUM CHLORIDE 0.9 % SOLUTION 250 ML FLEX CONT: Performed by: INTERNAL MEDICINE

## 2024-07-24 PROCEDURE — 96365 THER/PROPH/DIAG IV INF INIT: CPT

## 2024-07-24 PROCEDURE — 96413 CHEMO IV INFUSION 1 HR: CPT

## 2024-07-24 PROCEDURE — 96415 CHEMO IV INFUSION ADDL HR: CPT

## 2024-07-24 PROCEDURE — 96366 THER/PROPH/DIAG IV INF ADDON: CPT

## 2024-07-24 RX ADMIN — INFLIXIMAB 800 MG: 100 INJECTION, POWDER, LYOPHILIZED, FOR SOLUTION INTRAVENOUS at 08:51

## 2024-07-24 NOTE — NURSING NOTE
Patient arrived to [facility] at 0809. History and medications reviewed. Medication given as ordered. Patient tolerated well without complications. AVS refused. Patient discharged at 1115 in stable condition without complaints.

## 2024-08-19 RX ORDER — VENLAFAXINE HYDROCHLORIDE 37.5 MG/1
37.5 CAPSULE, EXTENDED RELEASE ORAL DAILY
Qty: 90 CAPSULE | Refills: 1 | Status: SHIPPED | OUTPATIENT
Start: 2024-08-19

## 2024-08-19 NOTE — TELEPHONE ENCOUNTER
Caller: Yelena Rangel    Relationship: Self    Best call back number: [phone]     Requested Prescriptions:   Requested Prescriptions     Pending Prescriptions Disp Refills    venlafaxine XR (EFFEXOR-XR) 37.5 MG 24 hr capsule 90 capsule 1     Sig: Take 1 capsule by mouth Daily.        Pharmacy where request should be sent: [facility] [id number] [facility] [address] [facility] [address] - [phone] PH - [phone] FX     Last office visit with prescribing clinician: 5/22/2024   Last telemedicine visit with prescribing clinician: Visit date not found   Next office visit with prescribing clinician: 11/25/2024     Additional details provided by patient: WILL NEED NEW PRESCRIPTION AND PATIENT WANTS A 90 DAY SUPPLY.      PATIENT HUNG UP SO NOT ABLE TO VERIFY THE SUPPLY SHE HAS LEFT.      Does the patient have less than a 3 day supply:  [] Yes  [] No    Would you like a call back once the refill request has been completed: [] Yes [] No    If the office needs to give you a call back, can they leave a voicemail: [] Yes [] No    Bill Garay Rep   08/19/24 08:44 EDT

## 2024-09-05 DIAGNOSIS — R06.2 WHEEZING: ICD-10-CM

## 2024-09-05 RX ORDER — ALBUTEROL SULFATE 90 UG/1
2 AEROSOL, METERED RESPIRATORY (INHALATION) EVERY 4 HOURS PRN
Qty: 18 G | Refills: 1 | Status: SHIPPED | OUTPATIENT
Start: 2024-09-05

## 2024-09-05 NOTE — TELEPHONE ENCOUNTER
Rx Refill Note  Requested Prescriptions     Pending Prescriptions Disp Refills    albuterol sulfate  (90 Base) MCG/ACT inhaler 18 g 1     Sig: Inhale 2 puffs Every 4 (Four) Hours As Needed for Wheezing.      Last office visit with prescribing clinician: 5/22/2024   Last telemedicine visit with prescribing clinician: Visit date not found   Next office visit with prescribing clinician: 11/25/2024                         Would you like a call back once the refill request has been completed: [] Yes [] No    If the office needs to give you a call back, can they leave a voicemail: [] Yes [] No    Lorraine Cat MA  09/05/24, 15:39 EDT

## 2024-09-18 ENCOUNTER — HOSPITAL ENCOUNTER (OUTPATIENT)
Dept: INFUSION THERAPY | Facility: HOSPITAL | Age: 74
Discharge: HOME | End: 2024-09-18
Admitting: INTERNAL MEDICINE
Payer: MEDICARE

## 2024-09-18 VITALS
WEIGHT: 185 LBS | RESPIRATION RATE: 16 BRPM | SYSTOLIC BLOOD PRESSURE: 146 MMHG | HEART RATE: 67 BPM | OXYGEN SATURATION: 95 % | TEMPERATURE: 97 F | BODY MASS INDEX: 33.84 KG/M2 | DIASTOLIC BLOOD PRESSURE: 84 MMHG

## 2024-09-18 DIAGNOSIS — K51.00 ULCERATIVE PANCOLITIS: Primary | ICD-10-CM

## 2024-09-18 PROCEDURE — 25810000003 SODIUM CHLORIDE 0.9 % SOLUTION 250 ML FLEX CONT: Performed by: INTERNAL MEDICINE

## 2024-09-18 PROCEDURE — 96413 CHEMO IV INFUSION 1 HR: CPT

## 2024-09-18 PROCEDURE — 96415 CHEMO IV INFUSION ADDL HR: CPT

## 2024-09-18 PROCEDURE — 25010000002 INFLIXIMAB PER 10 MG: Performed by: INTERNAL MEDICINE

## 2024-09-18 RX ADMIN — INFLIXIMAB 800 MG: 100 INJECTION, POWDER, LYOPHILIZED, FOR SOLUTION INTRAVENOUS at 08:37

## 2024-09-26 ENCOUNTER — OFFICE VISIT (OUTPATIENT)
Dept: GASTROENTEROLOGY | Facility: CLINIC | Age: 74
End: 2024-09-26
Payer: MEDICARE

## 2024-09-26 VITALS
SYSTOLIC BLOOD PRESSURE: 128 MMHG | BODY MASS INDEX: 35.51 KG/M2 | HEIGHT: 62 IN | WEIGHT: 193 LBS | DIASTOLIC BLOOD PRESSURE: 80 MMHG

## 2024-09-26 DIAGNOSIS — K51.00 ULCERATIVE PANCOLITIS: Primary | ICD-10-CM

## 2024-09-26 DIAGNOSIS — K21.00 GASTROESOPHAGEAL REFLUX DISEASE WITH ESOPHAGITIS WITHOUT HEMORRHAGE: ICD-10-CM

## 2024-09-26 PROCEDURE — 1160F RVW MEDS BY RX/DR IN RCRD: CPT | Performed by: INTERNAL MEDICINE

## 2024-09-26 PROCEDURE — 99213 OFFICE O/P EST LOW 20 MIN: CPT | Performed by: INTERNAL MEDICINE

## 2024-09-26 PROCEDURE — 1159F MED LIST DOCD IN RCRD: CPT | Performed by: INTERNAL MEDICINE

## 2024-10-29 DIAGNOSIS — R06.2 WHEEZING: ICD-10-CM

## 2024-10-29 RX ORDER — MONTELUKAST SODIUM 10 MG/1
10 TABLET ORAL NIGHTLY
Qty: 90 TABLET | Refills: 1 | Status: SHIPPED | OUTPATIENT
Start: 2024-10-29

## 2024-10-29 NOTE — TELEPHONE ENCOUNTER
Rx Refill Note  Requested Prescriptions     Pending Prescriptions Disp Refills    montelukast (Singulair) 10 MG tablet 90 tablet 1     Sig: Take 1 tablet by mouth Every Night.      Last office visit with prescribing clinician: 5/22/2024   Last telemedicine visit with prescribing clinician: Visit date not found   Next office visit with prescribing clinician: 11/25/2024                         Would you like a call back once the refill request has been completed: [] Yes [] No    If the office needs to give you a call back, can they leave a voicemail: [] Yes [] No    Lorraine Cat MA  10/29/24, 15:32 EDT

## 2024-11-06 DIAGNOSIS — M81.0 AGE-RELATED OSTEOPOROSIS WITHOUT CURRENT PATHOLOGICAL FRACTURE: Primary | ICD-10-CM

## 2024-11-13 ENCOUNTER — HOSPITAL ENCOUNTER (OUTPATIENT)
Dept: INFUSION THERAPY | Facility: HOSPITAL | Age: 74
Discharge: HOME | End: 2024-11-13
Admitting: INTERNAL MEDICINE
Payer: MEDICARE

## 2024-11-13 VITALS
DIASTOLIC BLOOD PRESSURE: 92 MMHG | RESPIRATION RATE: 16 BRPM | BODY MASS INDEX: 35.12 KG/M2 | HEART RATE: 66 BPM | WEIGHT: 192 LBS | SYSTOLIC BLOOD PRESSURE: 151 MMHG | TEMPERATURE: 96.7 F | OXYGEN SATURATION: 96 %

## 2024-11-13 DIAGNOSIS — K51.00 ULCERATIVE PANCOLITIS: Primary | ICD-10-CM

## 2024-11-13 DIAGNOSIS — M81.0 AGE-RELATED OSTEOPOROSIS WITHOUT CURRENT PATHOLOGICAL FRACTURE: ICD-10-CM

## 2024-11-13 LAB
ALBUMIN SERPL-MCNC: 4.1 G/DL (ref 3.5–5.2)
ALBUMIN/GLOB SERPL: 1 G/DL
ALP SERPL-CCNC: 67 U/L (ref 39–117)
ALT SERPL W P-5'-P-CCNC: 11 U/L (ref 1–33)
ANION GAP SERPL CALCULATED.3IONS-SCNC: 9.9 MMOL/L (ref 5–15)
AST SERPL-CCNC: 19 U/L (ref 1–32)
BILIRUB SERPL-MCNC: 0.4 MG/DL (ref 0–1.2)
BUN SERPL-MCNC: 11 MG/DL (ref 8–23)
BUN/CREAT SERPL: 18.6 (ref 7–25)
CALCIUM SPEC-SCNC: 9.2 MG/DL (ref 8.6–10.5)
CHLORIDE SERPL-SCNC: 96 MMOL/L (ref 98–107)
CO2 SERPL-SCNC: 24.1 MMOL/L (ref 22–29)
CREAT SERPL-MCNC: 0.59 MG/DL (ref 0.57–1)
EGFRCR SERPLBLD CKD-EPI 2021: 95.3 ML/MIN/1.73
GLOBULIN UR ELPH-MCNC: 4.1 GM/DL
GLUCOSE SERPL-MCNC: 97 MG/DL (ref 65–99)
MAGNESIUM SERPL-MCNC: 2 MG/DL (ref 1.6–2.4)
PHOSPHATE SERPL-MCNC: 3.9 MG/DL (ref 2.5–4.5)
POTASSIUM SERPL-SCNC: 4 MMOL/L (ref 3.5–5.2)
PROT SERPL-MCNC: 8.2 G/DL (ref 6–8.5)
SODIUM SERPL-SCNC: 130 MMOL/L (ref 136–145)

## 2024-11-13 PROCEDURE — 84443 ASSAY THYROID STIM HORMONE: CPT | Performed by: FAMILY MEDICINE

## 2024-11-13 PROCEDURE — 82306 VITAMIN D 25 HYDROXY: CPT | Performed by: FAMILY MEDICINE

## 2024-11-13 PROCEDURE — 96413 CHEMO IV INFUSION 1 HR: CPT

## 2024-11-13 PROCEDURE — 84439 ASSAY OF FREE THYROXINE: CPT | Performed by: FAMILY MEDICINE

## 2024-11-13 PROCEDURE — 85025 COMPLETE CBC W/AUTO DIFF WBC: CPT | Performed by: FAMILY MEDICINE

## 2024-11-13 PROCEDURE — 96415 CHEMO IV INFUSION ADDL HR: CPT

## 2024-11-13 PROCEDURE — 83735 ASSAY OF MAGNESIUM: CPT | Performed by: FAMILY MEDICINE

## 2024-11-13 PROCEDURE — 80053 COMPREHEN METABOLIC PANEL: CPT | Performed by: FAMILY MEDICINE

## 2024-11-13 PROCEDURE — 82607 VITAMIN B-12: CPT | Performed by: FAMILY MEDICINE

## 2024-11-13 PROCEDURE — 25010000002 INFLIXIMAB PER 10 MG: Performed by: INTERNAL MEDICINE

## 2024-11-13 PROCEDURE — 83036 HEMOGLOBIN GLYCOSYLATED A1C: CPT | Performed by: FAMILY MEDICINE

## 2024-11-13 PROCEDURE — 36415 COLL VENOUS BLD VENIPUNCTURE: CPT

## 2024-11-13 PROCEDURE — 80061 LIPID PANEL: CPT | Performed by: FAMILY MEDICINE

## 2024-11-13 PROCEDURE — 84100 ASSAY OF PHOSPHORUS: CPT | Performed by: FAMILY MEDICINE

## 2024-11-13 PROCEDURE — 25810000003 SODIUM CHLORIDE 0.9 % SOLUTION 250 ML FLEX CONT: Performed by: INTERNAL MEDICINE

## 2024-11-13 RX ADMIN — INFLIXIMAB 800 MG: 100 INJECTION, POWDER, LYOPHILIZED, FOR SOLUTION INTRAVENOUS at 09:11

## 2024-11-13 NOTE — NURSING NOTE
PT ARRIVED TO [facility] FOR APPT. VSS, NO COMPLAINTS AT THIS TIME. LABS DRAWN VIA VENIPUNCTURE. MEDICATION ADMINISTERED PER MD ORDER. PT TOLERATED WELL. VSS POST INFUSION. SCHEDULED NEXT APPT. AVS OFFERED, PT REFUSED. PT DISCHARGED FROM [facility] AT 12:20 PM IN STABLE CONDITION, WITHOUT COMPLAINTS.

## 2024-11-14 DIAGNOSIS — K21.00 GASTROESOPHAGEAL REFLUX DISEASE WITH ESOPHAGITIS WITHOUT HEMORRHAGE: ICD-10-CM

## 2024-11-14 RX ORDER — PRAVASTATIN SODIUM 10 MG
10 TABLET ORAL DAILY
Qty: 90 TABLET | Refills: 1 | Status: SHIPPED | OUTPATIENT
Start: 2024-11-14

## 2024-11-14 NOTE — TELEPHONE ENCOUNTER
Rx Refill Note  Requested Prescriptions     Pending Prescriptions Disp Refills    pravastatin (PRAVACHOL) 10 MG tablet 90 tablet 1     Sig: Take 1 tablet by mouth Daily.    omeprazole (priLOSEC) 20 MG capsule 90 capsule 1     Sig: Take 1 capsule by mouth Daily.      Last office visit with prescribing clinician: 5/22/2024   Last telemedicine visit with prescribing clinician: Visit date not found   Next office visit with prescribing clinician: 11/25/2024                         Would you like a call back once the refill request has been completed: [] Yes [] No    If the office needs to give you a call back, can they leave a voicemail: [] Yes [] No    Lorraine Cat MA  11/14/24, 15:20 EST

## 2024-11-23 DIAGNOSIS — E03.9 HYPOTHYROIDISM, UNSPECIFIED TYPE: ICD-10-CM

## 2024-11-23 DIAGNOSIS — J40 BRONCHITIS: ICD-10-CM

## 2024-11-23 RX ORDER — FLUTICASONE PROPIONATE AND SALMETEROL 250; 50 UG/1; UG/1
1 POWDER RESPIRATORY (INHALATION)
Qty: 3 EACH | Refills: 1 | Status: SHIPPED | OUTPATIENT
Start: 2024-11-23

## 2024-11-23 RX ORDER — LEVOTHYROXINE SODIUM 100 UG/1
100 TABLET ORAL DAILY
Qty: 90 TABLET | Refills: 1 | Status: SHIPPED | OUTPATIENT
Start: 2024-11-23

## 2024-11-23 NOTE — TELEPHONE ENCOUNTER
Rx Refill Note  Requested Prescriptions     Pending Prescriptions Disp Refills    levothyroxine (SYNTHROID, LEVOTHROID) 100 MCG tablet 90 tablet 1     Sig: Take 1 tablet by mouth Daily.      Last office visit with prescribing clinician: 5/22/2024   Last telemedicine visit with prescribing clinician: Visit date not found   Next office visit with prescribing clinician: 11/25/2024                         Would you like a call back once the refill request has been completed: [] Yes [] No    If the office needs to give you a call back, can they leave a voicemail: [] Yes [] No    Meghana Fuentes MA  11/23/24, 10:39 EST

## 2024-11-25 ENCOUNTER — OFFICE VISIT (OUTPATIENT)
Dept: INTERNAL MEDICINE | Facility: CLINIC | Age: 74
End: 2024-11-25
Payer: MEDICARE

## 2024-11-25 VITALS
TEMPERATURE: 97.5 F | HEIGHT: 62 IN | DIASTOLIC BLOOD PRESSURE: 88 MMHG | BODY MASS INDEX: 36.07 KG/M2 | WEIGHT: 196 LBS | SYSTOLIC BLOOD PRESSURE: 142 MMHG | HEART RATE: 76 BPM | OXYGEN SATURATION: 98 %

## 2024-11-25 DIAGNOSIS — R73.03 PREDIABETES: ICD-10-CM

## 2024-11-25 DIAGNOSIS — K51.00 ULCERATIVE PANCOLITIS: ICD-10-CM

## 2024-11-25 DIAGNOSIS — F32.A DEPRESSION, UNSPECIFIED DEPRESSION TYPE: ICD-10-CM

## 2024-11-25 DIAGNOSIS — J30.89 NON-SEASONAL ALLERGIC RHINITIS, UNSPECIFIED TRIGGER: ICD-10-CM

## 2024-11-25 DIAGNOSIS — I45.6 WPW (WOLFF-PARKINSON-WHITE SYNDROME): ICD-10-CM

## 2024-11-25 DIAGNOSIS — E78.5 HYPERLIPIDEMIA, UNSPECIFIED HYPERLIPIDEMIA TYPE: ICD-10-CM

## 2024-11-25 DIAGNOSIS — Z00.00 ROUTINE HEALTH MAINTENANCE: ICD-10-CM

## 2024-11-25 DIAGNOSIS — E87.1 HYPONATREMIA: ICD-10-CM

## 2024-11-25 DIAGNOSIS — J45.40 MODERATE PERSISTENT ASTHMA WITHOUT COMPLICATION: ICD-10-CM

## 2024-11-25 DIAGNOSIS — E55.9 HYPOVITAMINOSIS D: ICD-10-CM

## 2024-11-25 DIAGNOSIS — Z12.31 ENCOUNTER FOR SCREENING MAMMOGRAM FOR MALIGNANT NEOPLASM OF BREAST: ICD-10-CM

## 2024-11-25 DIAGNOSIS — Z00.00 MEDICARE ANNUAL WELLNESS VISIT, SUBSEQUENT: Primary | ICD-10-CM

## 2024-11-25 DIAGNOSIS — M81.0 AGE-RELATED OSTEOPOROSIS WITHOUT CURRENT PATHOLOGICAL FRACTURE: ICD-10-CM

## 2024-11-25 DIAGNOSIS — M05.79 RHEUMATOID ARTHRITIS INVOLVING MULTIPLE SITES WITH POSITIVE RHEUMATOID FACTOR: ICD-10-CM

## 2024-11-25 DIAGNOSIS — E03.9 HYPOTHYROIDISM, UNSPECIFIED TYPE: ICD-10-CM

## 2024-11-25 PROCEDURE — 1159F MED LIST DOCD IN RCRD: CPT | Performed by: FAMILY MEDICINE

## 2024-11-25 PROCEDURE — 1126F AMNT PAIN NOTED NONE PRSNT: CPT | Performed by: FAMILY MEDICINE

## 2024-11-25 PROCEDURE — 96160 PT-FOCUSED HLTH RISK ASSMT: CPT | Performed by: FAMILY MEDICINE

## 2024-11-25 PROCEDURE — 90662 IIV NO PRSV INCREASED AG IM: CPT | Performed by: FAMILY MEDICINE

## 2024-11-25 PROCEDURE — G0439 PPPS, SUBSEQ VISIT: HCPCS | Performed by: FAMILY MEDICINE

## 2024-11-25 PROCEDURE — 99214 OFFICE O/P EST MOD 30 MIN: CPT | Performed by: FAMILY MEDICINE

## 2024-11-25 PROCEDURE — G0008 ADMIN INFLUENZA VIRUS VAC: HCPCS | Performed by: FAMILY MEDICINE

## 2024-11-25 PROCEDURE — 1160F RVW MEDS BY RX/DR IN RCRD: CPT | Performed by: FAMILY MEDICINE

## 2024-11-25 PROCEDURE — 1170F FXNL STATUS ASSESSED: CPT | Performed by: FAMILY MEDICINE

## 2024-11-25 NOTE — PROGRESS NOTES
Subjective   The ABCs of the Annual Wellness Visit  Medicare Wellness Visit      Yelena Rangel is a 74 y.o. patient who presents for a Medicare Wellness Visit.    The following portions of the patient's history were reviewed and   updated as appropriate: allergies, current medications, past family history, past medical history, past social history, past surgical history, and problem list.    Compared to one year ago, the patient's physical   health is the same.  Compared to one year ago, the patient's mental   health is the same.    Recent Hospitalizations:  She was not admitted to the hospital during the last year.     Current Medical Providers:  Patient Care Team:  Juliano Mendez MD as PCP - General (Family Medicine)  Piotr, Dl Heath MD as Consulting Physician (Gastroenterology)  Simon Barber MD as Surgeon (Orthopedic Surgery)  Simon Barber MD as Surgeon (Orthopedic Surgery)    Outpatient Medications Prior to Visit   Medication Sig Dispense Refill    Acetaminophen (TYLENOL ARTHRITIS PAIN PO) Take 1 tablet by mouth 3 (Three) Times a Day. AS NEEDED FOR ARTHRITIS      albuterol sulfate  (90 Base) MCG/ACT inhaler Inhale 2 puffs Every 4 (Four) Hours As Needed for Wheezing. 18 g 1    aspirin 81 MG chewable tablet Chew 1 tablet Daily.      Calcium Carb-Cholecalciferol (CALCIUM PLUS VITAMIN D3 PO) Take 1,400 mg by mouth Daily. D3 = 1500 IU      cetirizine (ZyrTEC) 10 MG tablet Take 1 tablet by mouth Daily.      denosumab (PROLIA) 60 MG/ML solution prefilled syringe syringe Inject  under the skin into the appropriate area as directed 1 (One) Time.      Fluticasone-Salmeterol (ADVAIR/WIXELA) 250-50 MCG/ACT DISKUS Inhale 1 puff 2 (Two) Times a Day. 3 each 1    InFLIXimab (REMICADE IV) Infuse 1 dose into a venous catheter Every 30 (Thirty) Days. Every 60 days.  Dr. Saldaña.      levothyroxine (SYNTHROID, LEVOTHROID) 100 MCG tablet Take 1 tablet by mouth Daily. 90 tablet 1     montelukast (Singulair) 10 MG tablet Take 1 tablet by mouth Every Night. 90 tablet 1    Multiple Vitamins-Minerals (MULTIVITAMIN WITH MINERALS) tablet tablet Take 1 tablet by mouth Daily.      omeprazole (priLOSEC) 20 MG capsule Take 1 capsule by mouth Daily. 90 capsule 1    pravastatin (PRAVACHOL) 10 MG tablet Take 1 tablet by mouth Daily. 90 tablet 1    Probiotic Product (PROBIOTIC DAILY PO) Take 2 capsules by mouth Daily. OTC Probiotic      venlafaxine XR (EFFEXOR-XR) 37.5 MG 24 hr capsule Take 1 capsule by mouth Daily. 90 capsule 1     No facility-administered medications prior to visit.     No opioid medication identified on active medication list. I have reviewed chart for other potential  high risk medication/s and harmful drug interactions in the elderly.      Aspirin is on active medication list. Aspirin use is indicated based on review of current medical condition/s. Pros and cons of this therapy have been discussed today. Benefits of this medication outweigh potential harm.  Patient has been encouraged to continue taking this medication.  .      Patient Active Problem List   Diagnosis    Primary osteoarthritis of knees, bilateral    Depression    Hyperlipidemia    Hypothyroidism    Age-related osteoporosis without current pathological fracture    WPW (Liseth-Parkinson-White syndrome)    Non-specific colitis    Allergic rhinitis    Leukopenia    TIA (transient ischemic attack)    Rheumatoid arthritis involving multiple sites with positive rheumatoid factor    Preop cardiovascular exam    Acute upper respiratory infection    Gastroesophageal reflux disease with esophagitis    GERD (gastroesophageal reflux disease)    Hematuria    Osteoarthritis of knee    Plantar fasciitis    Routine health maintenance    Ulcerative pancolitis    Prediabetes    Moderate persistent asthma    Ulcerative proctitis without complication     Advance Care Planning Advance Directive is not on file.  ACP discussion was held with the  "patient during this visit. Patient does not have an advance directive, declines further assistance.            Objective   Vitals:    24 1012   BP: 142/88   BP Location: Left arm   Patient Position: Sitting   Cuff Size: Adult   Pulse: 76   Temp: 97.5 °F (36.4 °C)   TempSrc: Infrared   SpO2: 98%   Weight: 88.9 kg (196 lb)   Height: 157.5 cm (62\")   PainSc: 0-No pain       Estimated body mass index is 35.85 kg/m² as calculated from the following:    Height as of this encounter: 157.5 cm (62\").    Weight as of this encounter: 88.9 kg (196 lb).    Class 2 Severe Obesity (BMI >=35 and <=39.9). Obesity-related health conditions include the following: dyslipidemias. Obesity is worsening. BMI is is above average; BMI management plan is completed. We discussed portion control and increasing exercise.       Does the patient have evidence of cognitive impairment? No  Lab Results   Component Value Date    TRIG 172 (H) 2024    HDL 50 2024     (H) 2024    VLDL 30 2024    HGBA1C 5.61 (H) 2024                                                                                                Health  Risk Assessment    Smoking Status:  Social History     Tobacco Use   Smoking Status Never    Passive exposure: Never   Smokeless Tobacco Never     Alcohol Consumption:  Social History     Substance and Sexual Activity   Alcohol Use Yes    Alcohol/week: 3.0 standard drinks of alcohol    Types: 3 Cans of beer per week    Comment: twice week//caffeine yes       Fall Risk Screen  STEADI Fall Risk Assessment was completed, and patient is at LOW risk for falls.Assessment completed on:2024    Depression Screening   Little interest or pleasure in doing things? Not at all   Feeling down, depressed, or hopeless? Not at all   PHQ-2 Total Score 0      Health Habits and Functional and Cognitive Screenin/25/2024    10:14 AM   Functional & Cognitive Status   Do you have difficulty preparing food " and eating? No   Do you have difficulty bathing yourself, getting dressed or grooming yourself? No   Do you have difficulty using the toilet? No   Do you have difficulty moving around from place to place? No   Do you have trouble with steps or getting out of a bed or a chair? No   Current Diet Well Balanced Diet   Dental Exam Not up to date   Eye Exam Not up to date   Current Exercises Include No Regular Exercise   Do you need help using the phone?  No   Are you deaf or do you have serious difficulty hearing?  No   Do you need help to go to places out of walking distance? No   Do you need help shopping? No   Do you need help preparing meals?  No   Do you need help with housework?  No   Do you need help with laundry? No   Do you need help taking your medications? No   Do you need help managing money? No   Do you ever drive or ride in a car without wearing a seat belt? No   Have you felt unusual stress, anger or loneliness in the last month? No   Who do you live with? Spouse   If you need help, do you have trouble finding someone available to you? No   Have you been bothered in the last four weeks by sexual problems? No   Do you have difficulty concentrating, remembering or making decisions? Yes           Age-appropriate Screening Schedule:  Refer to the list below for future screening recommendations based on patient's age, sex and/or medical conditions. Orders for these recommended tests are listed in the plan section. The patient has been provided with a written plan.    Health Maintenance List  Health Maintenance   Topic Date Due    TDAP/TD VACCINES (1 - Tdap) Never done    PAP SMEAR  Never done    COVID-19 Vaccine (2 - 2024-25 season) 09/01/2024    LIPID PANEL  11/13/2025    ANNUAL WELLNESS VISIT  11/25/2025    BMI FOLLOWUP  11/25/2025    MAMMOGRAM  12/27/2025    DXA SCAN  12/27/2025    COLORECTAL CANCER SCREENING  11/23/2032    HEPATITIS C SCREENING  Completed    INFLUENZA VACCINE  Completed    Pneumococcal  "Vaccine 65+  Completed    ZOSTER VACCINE  Completed                                                                                                                                                CMS Preventative Services Quick Reference  Risk Factors Identified During Encounter  Immunizations Discussed/Encouraged: Tdap and RSV (Respiratory Syncytial Virus) at pharmacy.  Influenza today.    The above risks/problems have been discussed with the patient.  Pertinent information has been shared with the patient in the After Visit Summary.  An After Visit Summary and PPPS were made available to the patient.    Follow Up:   Next Medicare Wellness visit to be scheduled in 1 year.         Additional E&M Note during same encounter follows:  Patient has additional, significant, and separately identifiable condition(s)/problem(s) that require work above and beyond the Medicare Wellness Visit     Chief Complaint  Medicare Wellness-subsequent, Hypothyroidism, and Hyperlipidemia    Subjective   Hypothyroidism  Her past medical history is significant for hyperlipidemia.   Hyperlipidemia  Exacerbating diseases include hypothyroidism.     Yelena is also being seen today for additional medical problem/s.    Review of Systems   Constitutional: Negative.    HENT: Negative.     Respiratory: Negative.     Cardiovascular: Negative.    Gastrointestinal: Negative.    Neurological: Negative.    Hematological: Negative.    Psychiatric/Behavioral: Negative.            Objective   Vital Signs:  /88 (BP Location: Left arm, Patient Position: Sitting, Cuff Size: Adult)   Pulse 76   Temp 97.5 °F (36.4 °C) (Infrared)   Ht 157.5 cm (62\")   Wt 88.9 kg (196 lb)   SpO2 98%   BMI 35.85 kg/m²   Physical Exam  Vitals and nursing note reviewed.   Constitutional:       Appearance: Normal appearance. She is well-developed.   HENT:      Head: Normocephalic and atraumatic.      Mouth/Throat:      Mouth: Mucous membranes are moist.   Eyes:      " Extraocular Movements: Extraocular movements intact.      Conjunctiva/sclera: Conjunctivae normal.   Neck:      Thyroid: No thyromegaly.   Cardiovascular:      Rate and Rhythm: Normal rate and regular rhythm.      Heart sounds: Normal heart sounds.   Pulmonary:      Effort: Pulmonary effort is normal.      Breath sounds: Normal breath sounds.   Abdominal:      Palpations: Abdomen is soft.      Tenderness: There is no abdominal tenderness.   Musculoskeletal:      Cervical back: Neck supple. No rigidity.      Right lower leg: No edema.      Left lower leg: No edema.   Skin:     General: Skin is warm and dry.   Neurological:      General: No focal deficit present.      Mental Status: She is alert and oriented to person, place, and time.   Psychiatric:         Mood and Affect: Mood normal.         Behavior: Behavior normal.                       Assessment and Plan            Medicare annual wellness visit, subsequent         Hyperlipidemia, unspecified hyperlipidemia type       Orders:    Comprehensive Metabolic Panel; Future    Lipid Panel With / Chol / HDL Ratio; Future    Hypothyroidism, unspecified type    Orders:    TSH; Future    CBC (No Diff); Future    T4, Free; Future    Depression, unspecified depression type           Age-related osteoporosis without current pathological fracture         Ulcerative pancolitis         WPW (Liseth-Parkinson-White syndrome)         Rheumatoid arthritis involving multiple sites with positive rheumatoid factor         Prediabetes    Orders:    Hemoglobin A1c; Future    Vitamin B12; Future    Non-seasonal allergic rhinitis, unspecified trigger         Hyponatremia         Moderate persistent asthma without complication                 Routine health maintenance         Encounter for screening mammogram for malignant neoplasm of breast    Orders:    Mammo screening digital tomosynthesis bilateral w CAD; Future    Hypovitaminosis D    Orders:    Vitamin D,25-Hydroxy; Future    1.  Hyperlipidemia.  Continue pravastatin. Lifestyle measures.     2. Hypothyroidism.  Adequately replaced.  Labs reviewed.     3. Depression.  Controlled.  Continue venlafaxine and lifestyle measures.      4. Osteoporosis.  Continue Prolia.  Dexa scan done 12/2023 showed improvement in bone density in the spine.     5. Ulcerative colitis.  Continue per Dr. Saldaña.  On Remicade.     6. Liseth-Parkinson-White.  She saw cardiology before her knee replacements.     7. RA.  Has been controlled with Remicade.      8. TIA.  Continue ASA 81 mg and pravstatin.     9. Prediabetes.  A1c 5.6.  Continue lifestyle measures.    10. Perennial allergies.  Continue antihistamine and montelukast.     11. Hyponatremia.  Waxes and wanes.  ?Venlafaxine.       12. Asthma.  Controlled with Advair.     13. Routine health maint.  Mammogram done 12/2023 and is reordered.  RSV and Tdap at pharmacy.  Had both doses of Shingrix at pharmacy.  Prevnar 20 UTD.  Flu vaccine today.    14. Elevated blood pressure reading.  Home blood pressures are normal.            Follow Up   Return in about 6 months (around 5/25/2025).  Patient was given instructions and counseling regarding her condition or for health maintenance advice. Please see specific information pulled into the AVS if appropriate.

## 2025-01-03 ENCOUNTER — HOSPITAL ENCOUNTER (OUTPATIENT)
Dept: INFUSION THERAPY | Facility: HOSPITAL | Age: 75
Discharge: HOME | End: 2025-01-03
Admitting: FAMILY MEDICINE
Payer: MEDICARE

## 2025-01-03 VITALS
HEART RATE: 84 BPM | OXYGEN SATURATION: 95 % | RESPIRATION RATE: 16 BRPM | DIASTOLIC BLOOD PRESSURE: 88 MMHG | SYSTOLIC BLOOD PRESSURE: 150 MMHG

## 2025-01-03 DIAGNOSIS — M81.0 AGE-RELATED OSTEOPOROSIS WITHOUT CURRENT PATHOLOGICAL FRACTURE: Primary | ICD-10-CM

## 2025-01-03 PROCEDURE — 25010000002 DENOSUMAB 60 MG/ML SOLUTION PREFILLED SYRINGE: Performed by: FAMILY MEDICINE

## 2025-01-03 PROCEDURE — 96372 THER/PROPH/DIAG INJ SC/IM: CPT

## 2025-01-03 RX ADMIN — DENOSUMAB 60 MG: 60 INJECTION SUBCUTANEOUS at 09:06

## 2025-01-03 NOTE — NURSING NOTE
PT ARRIVED TO [facility] FOR APPT. VSS, NO COMPLAINTS AT THIS TIME. MEDICATION ADMINISTERED PER MD ORDER. PT TOLERATED WELL. SCHEDULED NEXT APPT. AVS OFFERED, PT REFUSED. PT DISCHARGED FROM [facility] AT 9:11 AM IN STABLE CONDITION, WITHOUT COMPLAINTS.

## 2025-01-03 NOTE — PATIENT INSTRUCTIONS
"  Call [facility] at [phone] if you have any problems or concerns.    We know you have a Choice in healthcare and appreciate you using [facility].  Our purpose is to provide you \"Excellent Care\".  We hope that you will always choose us in the future and continue to recommend us to your family and friends.              "

## 2025-01-08 ENCOUNTER — HOSPITAL ENCOUNTER (OUTPATIENT)
Dept: INFUSION THERAPY | Facility: HOSPITAL | Age: 75
Discharge: HOME | End: 2025-01-08
Admitting: INTERNAL MEDICINE
Payer: MEDICARE

## 2025-01-08 VITALS
OXYGEN SATURATION: 93 % | DIASTOLIC BLOOD PRESSURE: 82 MMHG | SYSTOLIC BLOOD PRESSURE: 151 MMHG | RESPIRATION RATE: 16 BRPM | BODY MASS INDEX: 34.93 KG/M2 | HEART RATE: 63 BPM | TEMPERATURE: 98 F | WEIGHT: 191 LBS

## 2025-01-08 DIAGNOSIS — K51.00 ULCERATIVE PANCOLITIS: Primary | ICD-10-CM

## 2025-01-08 PROCEDURE — 25010000002 INFLIXIMAB PER 10 MG: Performed by: INTERNAL MEDICINE

## 2025-01-08 PROCEDURE — 25810000003 SODIUM CHLORIDE 0.9 % SOLUTION 250 ML FLEX CONT: Performed by: INTERNAL MEDICINE

## 2025-01-08 PROCEDURE — 96413 CHEMO IV INFUSION 1 HR: CPT

## 2025-01-08 PROCEDURE — 96415 CHEMO IV INFUSION ADDL HR: CPT

## 2025-01-08 RX ADMIN — INFLIXIMAB 866 MG: 100 INJECTION, POWDER, LYOPHILIZED, FOR SOLUTION INTRAVENOUS at 09:06

## 2025-01-08 NOTE — NURSING NOTE
Patient arrived at 0800.  History and medications reviewed with patient.  Medication administered as ordered without complications.  AVS refused by patient.  Patient discharged at 1132 in stable condition without complaint.

## 2025-02-05 ENCOUNTER — HOSPITAL ENCOUNTER (OUTPATIENT)
Dept: MAMMOGRAPHY | Facility: HOSPITAL | Age: 75
Discharge: HOME | End: 2025-02-05
Admitting: FAMILY MEDICINE
Payer: MEDICARE

## 2025-02-05 DIAGNOSIS — Z12.31 ENCOUNTER FOR SCREENING MAMMOGRAM FOR MALIGNANT NEOPLASM OF BREAST: ICD-10-CM

## 2025-02-05 PROCEDURE — 77067 SCR MAMMO BI INCL CAD: CPT | Performed by: RADIOLOGY

## 2025-02-05 PROCEDURE — 77063 BREAST TOMOSYNTHESIS BI: CPT

## 2025-02-05 PROCEDURE — 77063 BREAST TOMOSYNTHESIS BI: CPT | Performed by: RADIOLOGY

## 2025-02-05 PROCEDURE — 77067 SCR MAMMO BI INCL CAD: CPT

## 2025-02-20 RX ORDER — VENLAFAXINE HYDROCHLORIDE 37.5 MG/1
37.5 CAPSULE, EXTENDED RELEASE ORAL DAILY
Qty: 90 CAPSULE | Refills: 1 | Status: SHIPPED | OUTPATIENT
Start: 2025-02-20

## 2025-02-20 NOTE — TELEPHONE ENCOUNTER
Caller: Yelena Rangel    Relationship: Self    Best call back number: [phone]    Requested Prescriptions:   Requested Prescriptions     Pending Prescriptions Disp Refills    venlafaxine XR (EFFEXOR-XR) 37.5 MG 24 hr capsule 90 capsule 1     Sig: Take 1 capsule by mouth Daily.        Pharmacy where request should be sent: [facility] [id number] [address] [facility] [address] - [phone] PH - [phone] FX     Last office visit with prescribing clinician: 11/25/2024   Last telemedicine visit with prescribing clinician: Visit date not found   Next office visit with prescribing clinician: 5/29/2025     Additional details provided by patient: LESS THAN 3 DAYS    Does the patient have less than a 3 day supply:  [x] Yes  [] No    Would you like a call back once the refill request has been completed: [] Yes [x] No    If the office needs to give you a call back, can they leave a voicemail: [] Yes [x] No    Marquis Devi   02/20/25 12:18 EST

## 2025-03-05 ENCOUNTER — HOSPITAL ENCOUNTER (OUTPATIENT)
Dept: INFUSION THERAPY | Facility: HOSPITAL | Age: 75
Discharge: HOME | End: 2025-03-05
Admitting: INTERNAL MEDICINE
Payer: MEDICARE

## 2025-03-05 VITALS
BODY MASS INDEX: 35.3 KG/M2 | OXYGEN SATURATION: 94 % | SYSTOLIC BLOOD PRESSURE: 150 MMHG | TEMPERATURE: 97.5 F | DIASTOLIC BLOOD PRESSURE: 51 MMHG | RESPIRATION RATE: 16 BRPM | HEART RATE: 67 BPM | WEIGHT: 193 LBS

## 2025-03-05 DIAGNOSIS — K51.00 ULCERATIVE PANCOLITIS: Primary | ICD-10-CM

## 2025-03-05 PROCEDURE — 96366 THER/PROPH/DIAG IV INF ADDON: CPT

## 2025-03-05 PROCEDURE — 96365 THER/PROPH/DIAG IV INF INIT: CPT

## 2025-03-05 PROCEDURE — 96415 CHEMO IV INFUSION ADDL HR: CPT

## 2025-03-05 PROCEDURE — 96413 CHEMO IV INFUSION 1 HR: CPT

## 2025-03-05 PROCEDURE — 25010000002 INFLIXIMAB PER 10 MG: Performed by: INTERNAL MEDICINE

## 2025-03-05 PROCEDURE — 25810000003 SODIUM CHLORIDE 0.9 % SOLUTION 250 ML FLEX CONT: Performed by: INTERNAL MEDICINE

## 2025-03-05 RX ADMIN — INFLIXIMAB 875 MG: 100 INJECTION, POWDER, LYOPHILIZED, FOR SOLUTION INTRAVENOUS at 09:20

## 2025-03-05 NOTE — NURSING NOTE
0805 Pt arrives to [facility] in stable condition. History and medications reviewed with patient.VSS. Infusion given as ordered,patient tolerated well. Patient refuses AVS. Patient discharged from [facility] in stable condition without any complaints at 1136.

## 2025-04-03 ENCOUNTER — OFFICE VISIT (OUTPATIENT)
Dept: GASTROENTEROLOGY | Facility: CLINIC | Age: 75
End: 2025-04-03
Payer: MEDICARE

## 2025-04-03 VITALS
HEIGHT: 62 IN | WEIGHT: 196 LBS | DIASTOLIC BLOOD PRESSURE: 82 MMHG | SYSTOLIC BLOOD PRESSURE: 122 MMHG | BODY MASS INDEX: 36.07 KG/M2

## 2025-04-03 DIAGNOSIS — K51.00 ULCERATIVE PANCOLITIS: Primary | ICD-10-CM

## 2025-04-03 DIAGNOSIS — K21.00 GASTROESOPHAGEAL REFLUX DISEASE WITH ESOPHAGITIS WITHOUT HEMORRHAGE: ICD-10-CM

## 2025-04-03 DIAGNOSIS — Z11.59 ENCOUNTER FOR SCREENING FOR OTHER VIRAL DISEASES: ICD-10-CM

## 2025-04-03 PROCEDURE — 99213 OFFICE O/P EST LOW 20 MIN: CPT | Performed by: INTERNAL MEDICINE

## 2025-04-03 NOTE — PROGRESS NOTES
PATIENT INFORMATION  Yelena Rangel       - 1950    CHIEF COMPLAINT  Chief Complaint   Patient presents with    Ulcerative pancolitis    Heartburn       HISTORY OF PRESENT ILLNESS  Only new med is a Preservision    Her HB is well managed    Has a sharp intermittent LLQ pain but no association with BM activity nor food and no colitis symptoms        REVIEWED PERTINENT RESULTS/ LABS  Lab Results   Component Value Date    CASEREPORT  2024     Surgical Pathology Report                         Case: [id number]                                  Authorizing Provider:  Dl Saldaña        Collected:           2024 03:28 PM                                 MD Ivana                                                                   Ordering Location:     [facility]   Received:            2024 03:47 PM                                 OR                                                                           Pathologist:           Alejandra Sanders MD                                                    Specimens:   1) - Large Intestine, Sigmoid Colon, BIOPSIES                                                       2) - Large Intestine, Rectum, BIOPSIES                                                     FINALDX  2024     1.  Sigmoid colon, biopsy:   -Benign colonic mucosa without diagnostic abnormality.    2.  Rectum, biopsy:   -Benign, mildly hyperplastic colonic mucosa.   -No evidence of active colitis or dysplasia identified.    [facility]       Lab Results   Component Value Date    HGB 11.8 (L) 2024    MCV 93.7 2024     2024    ALT 11 2024    AST 19 2024    HGBA1C 5.61 (H) 2024    INR 1.08 03/10/2020    TRIG 172 (H) 2024    FERRITIN 272.00 (H) 12/15/2023    IRON 27 (L) 2023    TIBC 302 12/15/2023      No results found.    REVIEW OF SYSTEMS  Review of Systems   Constitutional:  Negative for activity change, chills,  fever and unexpected weight change.   HENT:  Positive for trouble swallowing. Negative for congestion.    Eyes:  Negative for visual disturbance.   Respiratory:  Positive for cough and shortness of breath.    Cardiovascular:  Negative for chest pain and palpitations.   Gastrointestinal:  Positive for abdominal distention. Negative for abdominal pain and blood in stool.   Endocrine: Negative for cold intolerance and heat intolerance.   Genitourinary:  Negative for hematuria.   Musculoskeletal:  Negative for gait problem.   Skin:  Negative for color change.   Allergic/Immunologic: Negative for immunocompromised state.   Neurological:  Negative for weakness and light-headedness.   Hematological:  Negative for adenopathy.   Psychiatric/Behavioral:  Negative for sleep disturbance. The patient is not nervous/anxious.          ACTIVE PROBLEMS  Patient Active Problem List    Diagnosis     Ulcerative proctitis without complication [K51.20]     Moderate persistent asthma [J45.40]     Prediabetes [R73.03]     Ulcerative pancolitis [K51.00]     Acute upper respiratory infection [J06.9]     Gastroesophageal reflux disease with esophagitis [K21.00]     GERD (gastroesophageal reflux disease) [K21.9]     Hematuria [R31.9]     Osteoarthritis of knee [M17.9]     Plantar fasciitis [M72.2]     Routine health maintenance [Z00.00]     Rheumatoid arthritis involving multiple sites with positive rheumatoid factor [M05.79]     TIA (transient ischemic attack) [G45.9]     Preop cardiovascular exam [Z01.810]     Leukopenia [D72.819]     Depression [F32.A]     Hyperlipidemia [E78.5]     Hypothyroidism [E03.9]     Age-related osteoporosis without current pathological fracture [M81.0]     WPW (Liseth-Parkinson-White syndrome) [I45.6]     Non-specific colitis [K52.9]     Allergic rhinitis [J30.9]     Primary osteoarthritis of knees, bilateral [M17.0]          PAST MEDICAL HISTORY  Past Medical History:   Diagnosis Date    Anxiety     Arthritis      Arthritis     Asthma     Colon polyp     Cough     Depression     Disease of thyroid gland     Environmental allergies     GERD (gastroesophageal reflux disease)     Hyperlipidemia     Hypothyroidism     Osteopenia     Rheumatoid arthritis     Shingles     TIA (transient ischemic attack)     Ulcerative (chronic) rectosigmoiditis with rectal bleeding     Ulcerative colitis     WPW (Liseth-Parkinson-White syndrome)          SURGICAL HISTORY  Past Surgical History:   Procedure Laterality Date    COLONOSCOPY N/A 05/13/2016    Procedure: COLONOSCOPY with biopsies;  Surgeon: Dl Saldaña MD;  Location:  [facility] OR;  Service:     COLONOSCOPY N/A 11/23/2022    Procedure: COLONOSCOPY WITH BIOPSIES, POLYPECTOMY;  Surgeon: Dl Saldaña MD;  Location: [facility] OR;  Service: Gastroenterology;  Laterality: N/A;  Diverticulosis  Biopsies: right colon, transverse, sigmoid, and rectal biopsies  Sigmoid colon polyp at 20cm    COLONOSCOPY W/ POLYPECTOMY      FLEXIBLE SIGMOIDOSCOPY      HYSTERECTOMY      REPLACEMENT TOTAL KNEE Left 10/09/2018    [facility], Dr. Simon Barber, surgeon    SIGMOIDOSCOPY N/A 12/13/2017    Procedure: SIGMOIDOSCOPY FLEXIBLE with biopsy;  Surgeon: Dl Saldaña MD;  Location: [facility] OR;  Service:     SIGMOIDOSCOPY N/A 01/11/2024    Procedure: SIGMOIDOSCOPY FLEXIBLE;  Surgeon: Dl Saldaña MD;  Location: [facility] OR;  Service: Gastroenterology;  Laterality: N/A;  ULCERATIVE COLITIS  DIVERTICULOSIS  sigmoid bx  rectal bx    THYROID LOBECTOMY Right     TOTAL KNEE ARTHROPLASTY Right 11/20/2018    TUBAL ABDOMINAL LIGATION           FAMILY HISTORY  Family History   Problem Relation Age of Onset    Breast cancer Mother     Breast cancer Sister     Colon cancer Father     Colon cancer Brother          SOCIAL HISTORY  Social History     Occupational History    Not on file   Tobacco Use    Smoking status: Never     Passive exposure: Never    Smokeless tobacco:  Never   Vaping Use    Vaping status: Never Used   Substance and Sexual Activity    Alcohol use: Yes     Alcohol/week: 3.0 standard drinks of alcohol     Types: 3 Cans of beer per week     Comment: twice week//caffeine yes    Drug use: No    Sexual activity: Defer         CURRENT MEDICATIONS    Current Outpatient Medications:     Acetaminophen (TYLENOL ARTHRITIS PAIN PO), Take 1 tablet by mouth 3 (Three) Times a Day. AS NEEDED FOR ARTHRITIS, Disp: , Rfl:     albuterol sulfate  (90 Base) MCG/ACT inhaler, Inhale 2 puffs Every 4 (Four) Hours As Needed for Wheezing., Disp: 18 g, Rfl: 1    aspirin 81 MG chewable tablet, Chew 1 tablet Daily., Disp: , Rfl:     Calcium Carb-Cholecalciferol (CALCIUM PLUS VITAMIN D3 PO), Take 1,400 mg by mouth Daily. D3 = 1500 IU, Disp: , Rfl:     cetirizine (ZyrTEC) 10 MG tablet, Take 1 tablet by mouth Daily., Disp: , Rfl:     denosumab (PROLIA) 60 MG/ML solution prefilled syringe syringe, Inject  under the skin into the appropriate area as directed 1 (One) Time., Disp: , Rfl:     Fluticasone-Salmeterol (ADVAIR/WIXELA) 250-50 MCG/ACT DISKUS, Inhale 1 puff 2 (Two) Times a Day., Disp: 3 each, Rfl: 1    InFLIXimab (REMICADE IV), Infuse 1 dose into a venous catheter Every 30 (Thirty) Days. Every 60 days.  Dr. Saldaña., Disp: , Rfl:     levothyroxine (SYNTHROID, LEVOTHROID) 100 MCG tablet, Take 1 tablet by mouth Daily., Disp: 90 tablet, Rfl: 1    montelukast (Singulair) 10 MG tablet, Take 1 tablet by mouth Every Night., Disp: 90 tablet, Rfl: 1    Multiple Vitamins-Minerals (MULTIVITAMIN WITH MINERALS) tablet tablet, Take 1 tablet by mouth Daily., Disp: , Rfl:     multivitamin with minerals (PRESERVISION AREDS PO), Take 1 tablet by mouth Daily., Disp: , Rfl:     omeprazole (priLOSEC) 20 MG capsule, Take 1 capsule by mouth Daily., Disp: 90 capsule, Rfl: 1    pravastatin (PRAVACHOL) 10 MG tablet, Take 1 tablet by mouth Daily., Disp: 90 tablet, Rfl: 1    Probiotic Product (PROBIOTIC DAILY  "PO), Take 1 capsule by mouth Daily. OTC Probiotic, Disp: , Rfl:     venlafaxine XR (EFFEXOR-XR) 37.5 MG 24 hr capsule, Take 1 capsule by mouth Daily., Disp: 90 capsule, Rfl: 1    ALLERGIES  Sulfa antibiotics    VITALS  Vitals:    04/03/25 1033   BP: 122/82   BP Location: Left arm   Patient Position: Sitting   Cuff Size: Adult   Weight: 88.9 kg (196 lb)   Height: 157.5 cm (62\")       PHYSICAL EXAM  Debilities/Disabilities Identified: None  Emotional Behavior: Appropriate  Wt Readings from Last 3 Encounters:   04/03/25 88.9 kg (196 lb)   03/05/25 87.5 kg (193 lb)   01/08/25 86.6 kg (191 lb)     Ht Readings from Last 1 Encounters:   04/03/25 157.5 cm (62\")     Body mass index is 35.85 kg/m².  Physical Exam  Constitutional:       Appearance: She is well-developed. She is not diaphoretic.   Eyes:      General: No scleral icterus.     Conjunctiva/sclera: Conjunctivae normal.      Pupils: Pupils are equal, round, and reactive to light.   Neck:      Thyroid: No thyromegaly.   Cardiovascular:      Rate and Rhythm: Normal rate and regular rhythm.      Heart sounds: Normal heart sounds. No murmur heard.     No gallop.   Pulmonary:      Effort: Pulmonary effort is normal.      Breath sounds: Normal breath sounds. No wheezing or rales.   Abdominal:      General: Bowel sounds are normal. There is no distension or abdominal bruit.      Palpations: Abdomen is soft. There is no shifting dullness, fluid wave or mass.      Tenderness: There is no abdominal tenderness. There is no guarding. Negative signs include Sorto's sign.      Hernia: There is no hernia in the ventral area.   Musculoskeletal:         General: Normal range of motion.      Cervical back: Normal range of motion and neck supple.   Lymphadenopathy:      Cervical: No cervical adenopathy.   Skin:     General: Skin is warm and dry.      Findings: No erythema or rash.   Neurological:      Mental Status: She is alert and oriented to person, place, and time. "         CLINICAL DATA REVIEWED   reviewed previous lab results and integrated with today's visit, reviewed notes from other physicians and/or last GI encounter, reviewed previous endoscopy results and available photos, reviewed surgical pathology results from previous biopsies    ASSESSMENT  Diagnoses and all orders for this visit:    Ulcerative pancolitis  -     QuantiFERON TB Gold; Future  -     HBV Core Antibody, IgG / IgM Diff; Future    Gastroesophageal reflux disease with esophagitis without hemorrhage    Encounter for screening for other viral diseases  -     HBV Core Antibody, IgG / IgM Diff; Future    Other orders  -     multivitamin with minerals (PRESERVISION AREDS PO); Take 1 tablet by mouth Daily.          PLAN  Return in about 6 months (around 10/3/2025).    I have discussed the above plan with the patient.  They verbalize understanding and are in agreement with the plan.  They have been advised to contact the office for any questions, concerns, or changes related to their health.

## 2025-04-24 DIAGNOSIS — R06.2 WHEEZING: ICD-10-CM

## 2025-04-24 NOTE — TELEPHONE ENCOUNTER
Caller: Yelena Rangel    Relationship: Self    Best call back number: [phone]     Requested Prescriptions:   Requested Prescriptions     Pending Prescriptions Disp Refills    montelukast (Singulair) 10 MG tablet 90 tablet 1     Sig: Take 1 tablet by mouth Every Night.        Pharmacy where request should be sent: [id number] [place], [place] - [address] - [phone] PH - [phone] FX     Last office visit with prescribing clinician: 11/25/2024   Last telemedicine visit with prescribing clinician: Visit date not found   Next office visit with prescribing clinician: 5/29/2025     Does the patient have less than a 3 day supply:  [x] Yes  [] No

## 2025-04-28 RX ORDER — MONTELUKAST SODIUM 10 MG/1
10 TABLET ORAL NIGHTLY
Qty: 90 TABLET | Refills: 1 | Status: SHIPPED | OUTPATIENT
Start: 2025-04-28

## 2025-04-30 ENCOUNTER — HOSPITAL ENCOUNTER (OUTPATIENT)
Dept: INFUSION THERAPY | Facility: HOSPITAL | Age: 75
Discharge: HOME | End: 2025-04-30
Attending: INTERNAL MEDICINE | Admitting: INTERNAL MEDICINE
Payer: MEDICARE

## 2025-04-30 VITALS
SYSTOLIC BLOOD PRESSURE: 131 MMHG | BODY MASS INDEX: 35.85 KG/M2 | TEMPERATURE: 97.6 F | HEART RATE: 67 BPM | RESPIRATION RATE: 14 BRPM | WEIGHT: 196 LBS | DIASTOLIC BLOOD PRESSURE: 74 MMHG | OXYGEN SATURATION: 97 %

## 2025-04-30 DIAGNOSIS — K51.00 ULCERATIVE CHRONIC PANCOLITIS WITHOUT COMPLICATIONS: ICD-10-CM

## 2025-04-30 DIAGNOSIS — K51.00 ULCERATIVE PANCOLITIS: Primary | ICD-10-CM

## 2025-04-30 PROCEDURE — 96415 CHEMO IV INFUSION ADDL HR: CPT

## 2025-04-30 PROCEDURE — 25010000002 INFLIXIMAB PER 10 MG: Performed by: INTERNAL MEDICINE

## 2025-04-30 PROCEDURE — 25810000003 SODIUM CHLORIDE 0.9 % SOLUTION 250 ML FLEX CONT: Performed by: INTERNAL MEDICINE

## 2025-04-30 PROCEDURE — 96413 CHEMO IV INFUSION 1 HR: CPT

## 2025-04-30 RX ADMIN — INFLIXIMAB 900 MG: 100 INJECTION, POWDER, LYOPHILIZED, FOR SOLUTION INTRAVENOUS at 09:27

## 2025-04-30 NOTE — NURSING NOTE
0816 Patient arrived in [facility] for appointment, VSS denies any complaints  1137 Infusion completed patient tolerated well. Denies any complaints  1141 Patient discharged to home in stable condition denies copy of AVS when offered. VSS.

## 2025-05-14 RX ORDER — PRAVASTATIN SODIUM 10 MG
10 TABLET ORAL DAILY
Qty: 90 TABLET | Refills: 1 | Status: SHIPPED | OUTPATIENT
Start: 2025-05-14

## 2025-05-14 NOTE — TELEPHONE ENCOUNTER
Rx Refill Note  Requested Prescriptions     Pending Prescriptions Disp Refills    pravastatin (PRAVACHOL) 10 MG tablet 90 tablet 1     Sig: Take 1 tablet by mouth Daily.      Last office visit with prescribing clinician: 11/25/2024   Last telemedicine visit with prescribing clinician: Visit date not found   Next office visit with prescribing clinician: 5/29/2025                         Would you like a call back once the refill request has been completed: [] Yes [] No    If the office needs to give you a call back, can they leave a voicemail: [] Yes [] No    Meghana Fuentes MA  05/14/25, 08:38 EDT

## 2025-05-20 DIAGNOSIS — E03.9 HYPOTHYROIDISM, UNSPECIFIED TYPE: ICD-10-CM

## 2025-05-21 ENCOUNTER — TELEPHONE (OUTPATIENT)
Dept: INTERNAL MEDICINE | Facility: CLINIC | Age: 75
End: 2025-05-21

## 2025-05-21 RX ORDER — LEVOTHYROXINE SODIUM 100 UG/1
100 TABLET ORAL DAILY
Qty: 90 TABLET | Refills: 1 | Status: SHIPPED | OUTPATIENT
Start: 2025-05-21

## 2025-05-21 NOTE — TELEPHONE ENCOUNTER
Caller: Yelena Rangel    Relationship: Self    Best call back number:     What is the best time to reach you:     Who are you requesting to speak with (clinical staff, provider,  specific staff member):     Do you know the name of the person who called:     What was the call regarding: PATIENT IS CALLING IN TO ASK FOR A CALL BACK AS SHE HAS QUESTIONS ABOUT HER LAB ORDERS.

## 2025-05-22 ENCOUNTER — LAB (OUTPATIENT)
Dept: LAB | Facility: HOSPITAL | Age: 75
End: 2025-05-22
Payer: MEDICARE

## 2025-05-22 DIAGNOSIS — K51.00 ULCERATIVE PANCOLITIS: ICD-10-CM

## 2025-05-22 DIAGNOSIS — Z11.59 ENCOUNTER FOR SCREENING FOR OTHER VIRAL DISEASES: ICD-10-CM

## 2025-05-22 PROCEDURE — 86704 HEP B CORE ANTIBODY TOTAL: CPT

## 2025-05-22 PROCEDURE — 83036 HEMOGLOBIN GLYCOSYLATED A1C: CPT | Performed by: FAMILY MEDICINE

## 2025-05-22 PROCEDURE — 86480 TB TEST CELL IMMUN MEASURE: CPT

## 2025-05-22 PROCEDURE — 85027 COMPLETE CBC AUTOMATED: CPT | Performed by: FAMILY MEDICINE

## 2025-05-22 PROCEDURE — 80061 LIPID PANEL: CPT | Performed by: FAMILY MEDICINE

## 2025-05-22 PROCEDURE — 82607 VITAMIN B-12: CPT | Performed by: FAMILY MEDICINE

## 2025-05-22 PROCEDURE — 82306 VITAMIN D 25 HYDROXY: CPT | Performed by: FAMILY MEDICINE

## 2025-05-22 PROCEDURE — 86705 HEP B CORE ANTIBODY IGM: CPT

## 2025-05-22 PROCEDURE — 84439 ASSAY OF FREE THYROXINE: CPT | Performed by: FAMILY MEDICINE

## 2025-05-22 PROCEDURE — 84443 ASSAY THYROID STIM HORMONE: CPT | Performed by: FAMILY MEDICINE

## 2025-05-22 PROCEDURE — 80053 COMPREHEN METABOLIC PANEL: CPT | Performed by: FAMILY MEDICINE

## 2025-05-23 LAB
HBV CORE AB SERPL QL IA: NEGATIVE
HBV CORE IGM SERPL QL IA: NEGATIVE

## 2025-05-24 LAB
GAMMA INTERFERON BACKGROUND BLD IA-ACNC: 0.09 IU/ML
M TB IFN-G BLD-IMP: NEGATIVE
M TB IFN-G CD4+ BCKGRND COR BLD-ACNC: 0.09 IU/ML
M TB IFN-G CD4+CD8+ BCKGRND COR BLD-ACNC: 0.1 IU/ML
MITOGEN IGNF BCKGRD COR BLD-ACNC: >10 IU/ML
QUANTIFERON INCUBATION: NORMAL
SERVICE CMNT-IMP: NORMAL

## 2025-05-29 ENCOUNTER — OFFICE VISIT (OUTPATIENT)
Dept: INTERNAL MEDICINE | Facility: CLINIC | Age: 75
End: 2025-05-29
Payer: MEDICARE

## 2025-05-29 VITALS
BODY MASS INDEX: 36.25 KG/M2 | SYSTOLIC BLOOD PRESSURE: 130 MMHG | OXYGEN SATURATION: 97 % | TEMPERATURE: 96.4 F | WEIGHT: 197 LBS | HEART RATE: 73 BPM | DIASTOLIC BLOOD PRESSURE: 82 MMHG | HEIGHT: 62 IN

## 2025-05-29 DIAGNOSIS — F32.A DEPRESSION, UNSPECIFIED DEPRESSION TYPE: ICD-10-CM

## 2025-05-29 DIAGNOSIS — E78.5 HYPERLIPIDEMIA, UNSPECIFIED HYPERLIPIDEMIA TYPE: Primary | ICD-10-CM

## 2025-05-29 DIAGNOSIS — G45.9 TIA (TRANSIENT ISCHEMIC ATTACK): ICD-10-CM

## 2025-05-29 DIAGNOSIS — Z00.00 ROUTINE HEALTH MAINTENANCE: ICD-10-CM

## 2025-05-29 DIAGNOSIS — M81.0 AGE-RELATED OSTEOPOROSIS WITHOUT CURRENT PATHOLOGICAL FRACTURE: ICD-10-CM

## 2025-05-29 DIAGNOSIS — I45.6 WPW (WOLFF-PARKINSON-WHITE SYNDROME): ICD-10-CM

## 2025-05-29 DIAGNOSIS — J45.40 MODERATE PERSISTENT ASTHMA WITHOUT COMPLICATION: ICD-10-CM

## 2025-05-29 DIAGNOSIS — E87.1 HYPONATREMIA: ICD-10-CM

## 2025-05-29 DIAGNOSIS — R73.03 PREDIABETES: ICD-10-CM

## 2025-05-29 DIAGNOSIS — E03.9 HYPOTHYROIDISM, UNSPECIFIED TYPE: ICD-10-CM

## 2025-05-29 DIAGNOSIS — J30.89 NON-SEASONAL ALLERGIC RHINITIS, UNSPECIFIED TRIGGER: ICD-10-CM

## 2025-05-29 DIAGNOSIS — E55.9 HYPOVITAMINOSIS D: ICD-10-CM

## 2025-05-29 DIAGNOSIS — M05.79 RHEUMATOID ARTHRITIS INVOLVING MULTIPLE SITES WITH POSITIVE RHEUMATOID FACTOR: ICD-10-CM

## 2025-05-29 DIAGNOSIS — K51.00 ULCERATIVE PANCOLITIS: ICD-10-CM

## 2025-05-29 NOTE — PROGRESS NOTES
Subjective     Yelena Rangel is a 74 y.o. female, who presents with a chief complaint of   Chief Complaint   Patient presents with    Hyperlipidemia     6 mo f/u    Hypothyroidism       Hyperlipidemia  Exacerbating diseases include hypothyroidism.   Hypothyroidism  Pertinent negatives include no arthralgias.   Osteoporosis  Pertinent negatives include no arthralgias.   Depression  Anemia  Past medical history includes hypothyroidism.     1. Hyperlipidemia.  She is having some muscle pain with pravastatin.    2. Ulcerative colitis.  On Remicade every 2 months per Dr. Saldaña.  She has occasional flares.  Colonoscopy done 12/2022.    3. Depression.  Pt reports she is still doing well with venlafaxine.    4. Rheumatoid arthritis. This has been well-controlled with the Remicade.    The following portions of the patient's history were reviewed and updated as appropriate: allergies, current medications, past family history, past medical history, past social history, past surgical history and problem list.    Allergies: Sulfa antibiotics    Review of Systems   Constitutional: Negative.    HENT: Negative.     Eyes: Negative.    Respiratory: Negative.     Cardiovascular: Negative.    Gastrointestinal: Negative.    Endocrine: Negative.    Genitourinary: Negative.    Musculoskeletal: Negative.  Negative for arthralgias.   Skin: Negative.    Allergic/Immunologic: Positive for environmental allergies.   Neurological: Negative.    Hematological: Negative.    Psychiatric/Behavioral: Negative.         Objective     Wt Readings from Last 3 Encounters:   05/29/25 89.4 kg (197 lb)   04/30/25 88.9 kg (196 lb)   04/03/25 88.9 kg (196 lb)     Temp Readings from Last 3 Encounters:   05/29/25 96.4 °F (35.8 °C) (Infrared)   04/30/25 97.6 °F (36.4 °C)   03/05/25 97.5 °F (36.4 °C) (Temporal)     BP Readings from Last 3 Encounters:   05/29/25 130/82   04/30/25 131/74   04/03/25 122/82     Pulse Readings from Last 3 Encounters:   05/29/25 73    04/30/25 67   03/05/25 67     Body mass index is 36.03 kg/m².  SpO2 Readings from Last 3 Encounters:   09/27/17 98%   03/29/17 98%   09/28/16 97%       Physical Exam   Constitutional: She is oriented to person, place, and time. She appears well-developed.   HENT:   Head: Normocephalic and atraumatic. Mouth/Throat: Mucous membranes are moist.   Eyes: Conjunctivae are normal.   Neck: No thyromegaly present.   Cardiovascular: Normal rate, regular rhythm and normal heart sounds.   Pulmonary/Chest: Effort normal and breath sounds normal. No respiratory distress.   Abdominal: Soft. Normal appearance. There is no abdominal tenderness.   Musculoskeletal:      Right lower leg: No edema.      Left lower leg: No edema.   Neurological: She is alert and oriented to person, place, and time.   Skin: Skin is warm and dry.   Psychiatric: Her behavior is normal. Mood normal.   Nursing note and vitals reviewed.      Results for orders placed or performed in visit on 05/22/25   HBV Core Antibody, IgG / IgM Diff    Collection Time: 05/22/25  8:52 AM    Specimen: Blood   Result Value Ref Range    Hep B Core IgM Negative Negative    Hep B Core Total Ab Negative Negative   QuantiFERON-TB Gold Plus    Collection Time: 05/22/25  8:52 AM    Specimen: Blood   Result Value Ref Range    QuantiFERON Incubation Incubation performed.     QUANTIFERON-TB GOLD PLUS Negative Negative   QuantiFERON-TB Gold Plus    Collection Time: 05/22/25  8:52 AM    Specimen: Blood   Result Value Ref Range    QuantiFERON Criteria Comment     QUANTIFERON TB1 AG VALUE 0.09 IU/mL    QUANTIFERON TB2 AG VALUE 0.10 IU/mL    QuantiFERON Nil Value 0.09 IU/mL    QuantiFERON Mitogen Value >10.00 IU/mL       Assessment/Plan   Diagnoses and all orders for this visit:    1. Hyperlipidemia, unspecified hyperlipidemia type (Primary)  -     Comprehensive Metabolic Panel; Future  -     Lipid Panel With / Chol / HDL Ratio; Future    2. Hypothyroidism, unspecified type  -     TSH;  Future  -     CBC (No Diff); Future  -     T4, Free; Future    3. Depression, unspecified depression type    4. Age-related osteoporosis without current pathological fracture    5. Ulcerative pancolitis    6. WPW (Liseth-Parkinson-White syndrome)    7. Rheumatoid arthritis involving multiple sites with positive rheumatoid factor    8. TIA (transient ischemic attack)    9. Prediabetes  -     Hemoglobin A1c; Future  -     Vitamin B12; Future    10. Non-seasonal allergic rhinitis, unspecified trigger    11. Hyponatremia    12. Moderate persistent asthma without complication    13. Routine health maintenance    14. Hypovitaminosis D  -     Vitamin D,25-Hydroxy; Future    1. Hyperlipidemia.  Continue pravastatin.  Lifestyle measures.     2. Hypothyroidism.  Adequately replaced.  Labs reviewed.     3. Depression.  Controlled.  Continue venlafaxine and lifestyle measures.      4. Osteoporosis.  Continue Prolia.  Dexa scan done 12/2023 showed improvement in bone density in the spine.     5. Ulcerative colitis.  Continue per Dr. Saldaña.  On Remicade.     6. Liseth-Parkinson-White.  She saw cardiology before her knee replacements.     7. RA.  Has been controlled with Remicade.       8. TIA.  Continue ASA 81 mg and pravastatin.     9. Prediabetes.  A1c 5.7, up from 5.6.  Continue lifestyle measures.     10. Perennial allergies.  Continue antihistamine and montelukast.     11. Hyponatremia.  Mild.  Waxes and wanes.  ?Venlafaxine.       12. Asthma.  Controlled with Advair.     13. Routine health maint.  Mammogram UTD.  RSV and Tdap at pharmacy.  Had both doses of Shingrix at pharmacy.  Prevnar 20 UTD.       14. Cataracts.  She is having surgery on both eyes next month.      Outpatient Medications Prior to Visit   Medication Sig Dispense Refill    Acetaminophen (TYLENOL ARTHRITIS PAIN PO) Take 1 tablet by mouth 3 (Three) Times a Day. AS NEEDED FOR ARTHRITIS      albuterol sulfate  (90 Base) MCG/ACT inhaler Inhale 2 puffs  Every 4 (Four) Hours As Needed for Wheezing. 18 g 1    aspirin 81 MG chewable tablet Chew 1 tablet Daily.      Calcium Carb-Cholecalciferol (CALCIUM PLUS VITAMIN D3 PO) Take 1,400 mg by mouth Daily. D3 = 1500 IU      cetirizine (ZyrTEC) 10 MG tablet Take 1 tablet by mouth Daily.      denosumab (PROLIA) 60 MG/ML solution prefilled syringe syringe Inject  under the skin into the appropriate area as directed 1 (One) Time.      Fluticasone-Salmeterol (ADVAIR/WIXELA) 250-50 MCG/ACT DISKUS Inhale 1 puff 2 (Two) Times a Day. 3 each 1    InFLIXimab (REMICADE IV) Infuse 1 dose into a venous catheter Every 30 (Thirty) Days. Every 60 days.  Dr. Saldaña.      levothyroxine (SYNTHROID, LEVOTHROID) 100 MCG tablet Take 1 tablet by mouth Daily. 90 tablet 1    montelukast (Singulair) 10 MG tablet Take 1 tablet by mouth Every Night. 90 tablet 1    Multiple Vitamins-Minerals (MULTIVITAMIN WITH MINERALS) tablet tablet Take 1 tablet by mouth Daily.      multivitamin with minerals (PRESERVISION AREDS PO) Take 1 tablet by mouth Daily.      omeprazole (priLOSEC) 20 MG capsule Take 1 capsule by mouth Daily. 90 capsule 1    pravastatin (PRAVACHOL) 10 MG tablet Take 1 tablet by mouth Daily. 90 tablet 1    Probiotic Product (PROBIOTIC DAILY PO) Take 1 capsule by mouth Daily. OTC Probiotic      venlafaxine XR (EFFEXOR-XR) 37.5 MG 24 hr capsule Take 1 capsule by mouth Daily. 90 capsule 1     No facility-administered medications prior to visit.     No orders of the defined types were placed in this encounter.    [unfilled]  There are no discontinued medications.      Return in about 6 months (around 11/29/2025) for Medicare Wellness.

## 2025-06-25 ENCOUNTER — HOSPITAL ENCOUNTER (OUTPATIENT)
Dept: INFUSION THERAPY | Facility: HOSPITAL | Age: 75
Discharge: HOME | End: 2025-06-25
Admitting: INTERNAL MEDICINE
Payer: MEDICARE

## 2025-06-25 VITALS
HEART RATE: 75 BPM | BODY MASS INDEX: 35.67 KG/M2 | RESPIRATION RATE: 16 BRPM | DIASTOLIC BLOOD PRESSURE: 95 MMHG | TEMPERATURE: 97.5 F | WEIGHT: 195 LBS | OXYGEN SATURATION: 95 % | SYSTOLIC BLOOD PRESSURE: 148 MMHG

## 2025-06-25 DIAGNOSIS — K51.00 ULCERATIVE PANCOLITIS: Primary | ICD-10-CM

## 2025-06-25 PROCEDURE — 96413 CHEMO IV INFUSION 1 HR: CPT

## 2025-06-25 PROCEDURE — 96415 CHEMO IV INFUSION ADDL HR: CPT

## 2025-06-25 PROCEDURE — 25810000003 SODIUM CHLORIDE 0.9 % SOLUTION 250 ML FLEX CONT: Performed by: INTERNAL MEDICINE

## 2025-06-25 PROCEDURE — 25010000002 INFLIXIMAB PER 10 MG: Performed by: INTERNAL MEDICINE

## 2025-06-25 RX ADMIN — INFLIXIMAB 885 MG: 100 INJECTION, POWDER, LYOPHILIZED, FOR SOLUTION INTRAVENOUS at 08:54

## 2025-06-25 NOTE — NURSING NOTE
Patient arrived to [facility] at 0802.  History and medications reviewed with patient.  Medication administered as ordered without complications.  AVS refused by patient.  Patient discharged at 1118 in stable condition without complaint.

## 2025-07-07 ENCOUNTER — HOSPITAL ENCOUNTER (OUTPATIENT)
Dept: INFUSION THERAPY | Facility: HOSPITAL | Age: 75
Discharge: HOME | End: 2025-07-07
Payer: MEDICARE

## 2025-07-10 ENCOUNTER — HOSPITAL ENCOUNTER (OUTPATIENT)
Dept: INFUSION THERAPY | Facility: HOSPITAL | Age: 75
Discharge: HOME | End: 2025-07-10
Admitting: FAMILY MEDICINE
Payer: MEDICARE

## 2025-07-10 VITALS
OXYGEN SATURATION: 95 % | TEMPERATURE: 97.2 F | RESPIRATION RATE: 16 BRPM | SYSTOLIC BLOOD PRESSURE: 137 MMHG | HEART RATE: 74 BPM | DIASTOLIC BLOOD PRESSURE: 89 MMHG

## 2025-07-10 DIAGNOSIS — M81.0 AGE-RELATED OSTEOPOROSIS WITHOUT CURRENT PATHOLOGICAL FRACTURE: Primary | ICD-10-CM

## 2025-07-10 DIAGNOSIS — J40 BRONCHITIS: ICD-10-CM

## 2025-07-10 LAB
MAGNESIUM SERPL-MCNC: 1.9 MG/DL (ref 1.6–2.4)
PHOSPHATE SERPL-MCNC: 3.6 MG/DL (ref 2.5–4.5)

## 2025-07-10 PROCEDURE — 83735 ASSAY OF MAGNESIUM: CPT | Performed by: FAMILY MEDICINE

## 2025-07-10 PROCEDURE — 84100 ASSAY OF PHOSPHORUS: CPT | Performed by: FAMILY MEDICINE

## 2025-07-10 PROCEDURE — 96372 THER/PROPH/DIAG INJ SC/IM: CPT

## 2025-07-10 PROCEDURE — 25010000002 DENOSUMAB 60 MG/ML SOLUTION PREFILLED SYRINGE: Performed by: FAMILY MEDICINE

## 2025-07-10 PROCEDURE — 36415 COLL VENOUS BLD VENIPUNCTURE: CPT

## 2025-07-10 RX ORDER — FLUTICASONE PROPIONATE AND SALMETEROL 250; 50 UG/1; UG/1
1 POWDER RESPIRATORY (INHALATION)
Qty: 3 EACH | Refills: 1 | Status: SHIPPED | OUTPATIENT
Start: 2025-07-10

## 2025-07-10 RX ADMIN — DENOSUMAB 60 MG: 60 INJECTION SUBCUTANEOUS at 08:55

## 2025-07-10 NOTE — NURSING NOTE
Patient arrived to [facility] at 0809. History and medications reiviewed. VSS. Labs drawn. Medication given as ordered. Patient tolerated well without complications. AVS refused. Patient discharged at 0900 in stable condition without complaints.

## 2025-07-21 DIAGNOSIS — K21.00 GASTROESOPHAGEAL REFLUX DISEASE WITH ESOPHAGITIS WITHOUT HEMORRHAGE: ICD-10-CM

## 2025-07-21 RX ORDER — OMEPRAZOLE 20 MG/1
20 CAPSULE, DELAYED RELEASE ORAL DAILY
Qty: 90 CAPSULE | Refills: 1 | Status: SHIPPED | OUTPATIENT
Start: 2025-07-21

## 2025-07-21 NOTE — TELEPHONE ENCOUNTER
Caller: Yelena Rangel    Relationship: Self    Best call back number: [phone]    Requested Prescriptions:   Requested Prescriptions     Pending Prescriptions Disp Refills    omeprazole (priLOSEC) 20 MG capsule 90 capsule 1     Sig: Take 1 capsule by mouth Daily.      Pharmacy where request should be sent: [id number] [place], [place] - [address] - [phone] PH - [phone] FX     Last office visit with prescribing clinician: 5/29/2025   Last telemedicine visit with prescribing clinician: Visit date not found   Next office visit with prescribing clinician: 12/8/2025     Does the patient have less than a 3 day supply:  [x] Yes  [] No    Bill Frias Rep   07/21/25 11:47 EDT

## 2025-07-21 NOTE — TELEPHONE ENCOUNTER
No osteoporosis diagnosis on problem list     Rx Refill Note  Requested Prescriptions     Pending Prescriptions Disp Refills    omeprazole (priLOSEC) 20 MG capsule 90 capsule 1     Sig: Take 1 capsule by mouth Daily.      Last office visit with prescribing clinician: 5/29/2025   Last telemedicine visit with prescribing clinician: Visit date not found   Next office visit with prescribing clinician: 12/8/2025                         Would you like a call back once the refill request has been completed: [] Yes [] No    If the office needs to give you a call back, can they leave a voicemail: [] Yes [] No    Meghana Fuentes MA  07/21/25, 13:11 EDT

## 2025-08-13 RX ORDER — VENLAFAXINE HYDROCHLORIDE 37.5 MG/1
37.5 CAPSULE, EXTENDED RELEASE ORAL DAILY
Qty: 90 CAPSULE | Refills: 1 | Status: SHIPPED | OUTPATIENT
Start: 2025-08-13

## 2025-08-20 ENCOUNTER — HOSPITAL ENCOUNTER (OUTPATIENT)
Dept: INFUSION THERAPY | Facility: HOSPITAL | Age: 75
Discharge: HOME | End: 2025-08-20
Admitting: INTERNAL MEDICINE
Payer: MEDICARE

## 2025-08-20 VITALS
OXYGEN SATURATION: 94 % | DIASTOLIC BLOOD PRESSURE: 64 MMHG | HEART RATE: 65 BPM | SYSTOLIC BLOOD PRESSURE: 134 MMHG | TEMPERATURE: 98.1 F | WEIGHT: 196 LBS | RESPIRATION RATE: 16 BRPM | BODY MASS INDEX: 35.85 KG/M2

## 2025-08-20 DIAGNOSIS — M81.0 AGE-RELATED OSTEOPOROSIS WITHOUT CURRENT PATHOLOGICAL FRACTURE: Primary | ICD-10-CM

## 2025-08-20 DIAGNOSIS — K51.00 ULCERATIVE PANCOLITIS: ICD-10-CM

## 2025-08-20 PROCEDURE — 96413 CHEMO IV INFUSION 1 HR: CPT

## 2025-08-20 PROCEDURE — 25810000003 SODIUM CHLORIDE 0.9 % SOLUTION 250 ML FLEX CONT: Performed by: INTERNAL MEDICINE

## 2025-08-20 PROCEDURE — 25010000002 INFLIXIMAB PER 10 MG: Performed by: INTERNAL MEDICINE

## 2025-08-20 PROCEDURE — 96366 THER/PROPH/DIAG IV INF ADDON: CPT

## 2025-08-20 PROCEDURE — 96365 THER/PROPH/DIAG IV INF INIT: CPT

## 2025-08-20 PROCEDURE — 96415 CHEMO IV INFUSION ADDL HR: CPT

## 2025-08-20 RX ORDER — CYCLOSPORINE 0.5 MG/ML
EMULSION OPHTHALMIC
COMMUNITY
Start: 2025-07-22

## 2025-08-20 RX ADMIN — INFLIXIMAB 900 MG: 100 INJECTION, POWDER, LYOPHILIZED, FOR SOLUTION INTRAVENOUS at 08:51

## (undated) DEVICE — BW-412T DISP COMBO CLEANING BRUSH: Brand: SINGLE USE COMBINATION CLEANING BRUSH

## (undated) DEVICE — Device

## (undated) DEVICE — FRCP BX RADJAW4 NDL 3.2 240CM JUMBO ONG

## (undated) DEVICE — Device: Brand: DEFENDO AIR/WATER/SUCTION AND BIOPSY VALVE

## (undated) DEVICE — FRCP BX RADJAW4 NDL 2.8 240CM LG OG BX40

## (undated) DEVICE — SPNG GZ WOVN 4X4IN 12PLY 10/BX STRL

## (undated) DEVICE — VIAL FORMALIN CAP 10P 40ML

## (undated) DEVICE — SYR LL W/SCALE/MARK 3ML STRL

## (undated) DEVICE — KT ORCA ORCAPOD DISP STRL

## (undated) DEVICE — GOWN ISOL W/THUMB UNIV BLU BX/15

## (undated) DEVICE — SUCTION CANISTER, 3000CC,SAFELINER: Brand: DEROYAL

## (undated) DEVICE — ADAPT CLN BIOGUARD AIR/H2O DISP

## (undated) DEVICE — SYR LUER SLPTP 50ML

## (undated) DEVICE — SOL IRR H2O BTL 1000ML STRL

## (undated) DEVICE — SAFELINER SUCTION CANISTER 1000CC: Brand: DEROYAL

## (undated) DEVICE — JACKT LAB F/R KNIT CUFF/COLR XLG BLU

## (undated) DEVICE — GLV SURG SENSICARE PI MIC PF SZ7.5 LF STRL

## (undated) DEVICE — SYR LL 3CC

## (undated) DEVICE — GLV SURG SENSICARE PI MIC PF SZ8 LF STRL

## (undated) DEVICE — GLV SURG NEOLON 2G PF LF 7.5 STRL

## (undated) DEVICE — MASK,FACE,SHIELD,BLUE,ANTI FOG,TIES: Brand: MEDLINE